# Patient Record
Sex: FEMALE | Race: WHITE | NOT HISPANIC OR LATINO | Employment: OTHER | URBAN - METROPOLITAN AREA
[De-identification: names, ages, dates, MRNs, and addresses within clinical notes are randomized per-mention and may not be internally consistent; named-entity substitution may affect disease eponyms.]

---

## 2017-03-30 ENCOUNTER — ALLSCRIPTS OFFICE VISIT (OUTPATIENT)
Dept: OTHER | Facility: OTHER | Age: 77
End: 2017-03-30

## 2017-03-30 DIAGNOSIS — G60.9 HEREDITARY AND IDIOPATHIC NEUROPATHY: ICD-10-CM

## 2017-04-27 ENCOUNTER — ALLSCRIPTS OFFICE VISIT (OUTPATIENT)
Dept: OTHER | Facility: OTHER | Age: 77
End: 2017-04-27

## 2017-06-03 ENCOUNTER — LAB CONVERSION - ENCOUNTER (OUTPATIENT)
Dept: OTHER | Facility: OTHER | Age: 77
End: 2017-06-03

## 2017-06-03 LAB
A/G RATIO (HISTORICAL): 2.2 (CALC) (ref 1–2.5)
ALBUMIN SERPL BCP-MCNC: 4.1 G/DL (ref 3.6–5.1)
ALP SERPL-CCNC: 58 U/L (ref 33–130)
ALT SERPL W P-5'-P-CCNC: 9 U/L (ref 6–29)
AST SERPL W P-5'-P-CCNC: 19 U/L (ref 10–35)
BASOPHILS # BLD AUTO: 0.5 %
BASOPHILS # BLD AUTO: 22 CELLS/UL (ref 0–200)
BILIRUB SERPL-MCNC: 0.5 MG/DL (ref 0.2–1.2)
BUN SERPL-MCNC: 20 MG/DL (ref 7–25)
BUN/CREA RATIO (HISTORICAL): ABNORMAL (CALC) (ref 6–22)
CALCIUM (ADJUSTED FOR ALBUMIN) (HISTORICAL): 9.8 MG/DL (CALC) (ref 8.6–10.2)
CALCIUM SERPL-MCNC: 9.6 MG/DL (ref 8.6–10.4)
CHLORIDE SERPL-SCNC: 107 MMOL/L (ref 98–110)
CHOLEST SERPL-MCNC: 173 MG/DL (ref 125–200)
CHOLEST/HDLC SERPL: 2.1 (CALC)
CO2 SERPL-SCNC: 29 MMOL/L (ref 20–31)
CREAT SERPL-MCNC: 0.67 MG/DL (ref 0.6–0.93)
DEPRECATED RDW RBC AUTO: 14.7 % (ref 11–15)
EGFR AFRICAN AMERICAN (HISTORICAL): 97 ML/MIN/1.73M2
EGFR-AMERICAN CALC (HISTORICAL): 84 ML/MIN/1.73M2
EOSINOPHIL # BLD AUTO: 251 CELLS/UL (ref 15–500)
EOSINOPHIL # BLD AUTO: 5.7 %
EST. AVERAGE GLUCOSE BLD GHB EST-MCNC: 103 (CALC)
EST. AVERAGE GLUCOSE BLD GHB EST-MCNC: 5.7 (CALC)
GAMMA GLOBULIN (HISTORICAL): 1.9 G/DL (CALC) (ref 1.9–3.7)
GLUCOSE (HISTORICAL): 84 MG/DL (ref 65–99)
HBA1C MFR BLD HPLC: 5.2 % OF TOTAL HGB
HCT VFR BLD AUTO: 38.9 % (ref 35–45)
HDLC SERPL-MCNC: 81 MG/DL
HGB BLD-MCNC: 12.9 G/DL (ref 11.7–15.5)
LDL CHOLESTEROL (HISTORICAL): 82 MG/DL (CALC)
LYMPHOCYTES # BLD AUTO: 21.7 %
LYMPHOCYTES # BLD AUTO: 955 CELLS/UL (ref 850–3900)
MCH RBC QN AUTO: 30.6 PG (ref 27–33)
MCHC RBC AUTO-ENTMCNC: 33.1 G/DL (ref 32–36)
MCV RBC AUTO: 92.6 FL (ref 80–100)
MONOCYTES # BLD AUTO: 312 CELLS/UL (ref 200–950)
MONOCYTES (HISTORICAL): 7.1 %
NEUTROPHILS # BLD AUTO: 2860 CELLS/UL (ref 1500–7800)
NEUTROPHILS # BLD AUTO: 65 %
NON-HDL-CHOL (CHOL-HDL) (HISTORICAL): 92 MG/DL (CALC)
PLATELET # BLD AUTO: 276 THOUSAND/UL (ref 140–400)
PMV BLD AUTO: 8.7 FL (ref 7.5–12.5)
POTASSIUM SERPL-SCNC: 4.2 MMOL/L (ref 3.5–5.3)
RBC # BLD AUTO: 4.2 MILLION/UL (ref 3.8–5.1)
SODIUM SERPL-SCNC: 141 MMOL/L (ref 135–146)
TOTAL PROTEIN (HISTORICAL): 6 G/DL (ref 6.1–8.1)
TOTAL PROTEIN (HISTORICAL): 6.2 G/DL (ref 6.1–8.1)
TRIGL SERPL-MCNC: 49 MG/DL
TSH SERPL DL<=0.05 MIU/L-ACNC: 3.97 MIU/L (ref 0.4–4.5)
WBC # BLD AUTO: 4.4 THOUSAND/UL (ref 3.8–10.8)

## 2017-06-05 ENCOUNTER — LAB CONVERSION - ENCOUNTER (OUTPATIENT)
Dept: OTHER | Facility: OTHER | Age: 77
End: 2017-06-05

## 2017-06-05 ENCOUNTER — GENERIC CONVERSION - ENCOUNTER (OUTPATIENT)
Dept: OTHER | Facility: OTHER | Age: 77
End: 2017-06-05

## 2017-06-05 LAB
ALBUMIN SERPL BCP-MCNC: 4 G/DL (ref 3.8–4.8)
ALPHA 1 (HISTORICAL): 0.2 G/DL (ref 0.2–0.3)
ALPHA 2 (HISTORICAL): 0.6 G/DL (ref 0.5–0.9)
BETA-1 (HISTORICAL): 0.4 G/DL (ref 0.4–0.6)
BETA-2 (HISTORICAL): 0.3 G/DL (ref 0.2–0.5)
FOLATE SERPL-MCNC: >24 NG/ML
GAMMA GLOBULIN (HISTORICAL): 0.8 G/DL (ref 0.8–1.7)
INTERPRETATION (HISTORICAL): NORMAL
TOTAL PROTEIN (HISTORICAL): 6.2 G/DL (ref 6.1–8.1)
VIT B12 SERPL-MCNC: 1054 PG/ML (ref 200–1100)

## 2017-06-06 ENCOUNTER — LAB CONVERSION - ENCOUNTER (OUTPATIENT)
Dept: OTHER | Facility: OTHER | Age: 77
End: 2017-06-06

## 2017-06-06 LAB
ALBUMIN SERPL BCP-MCNC: 4 G/DL (ref 3.8–4.8)
ALPHA 1 (HISTORICAL): 0.2 G/DL (ref 0.2–0.3)
ALPHA 2 (HISTORICAL): 0.6 G/DL (ref 0.5–0.9)
ANTI-NUCLEAR ANTIBODY (ANA) (HISTORICAL): NEGATIVE
BETA-1 (HISTORICAL): 0.4 G/DL (ref 0.4–0.6)
BETA-2 (HISTORICAL): 0.3 G/DL (ref 0.2–0.5)
FOLATE SERPL-MCNC: >24 NG/ML
GAMMA GLOBULIN (HISTORICAL): 0.8 G/DL (ref 0.8–1.7)
INTERPRETATION (HISTORICAL): NORMAL
TOTAL PROTEIN (HISTORICAL): 6.2 G/DL (ref 6.1–8.1)
VIT B12 SERPL-MCNC: 1054 PG/ML (ref 200–1100)

## 2017-09-21 ENCOUNTER — GENERIC CONVERSION - ENCOUNTER (OUTPATIENT)
Dept: OTHER | Facility: OTHER | Age: 77
End: 2017-09-21

## 2017-09-27 ENCOUNTER — GENERIC CONVERSION - ENCOUNTER (OUTPATIENT)
Dept: OTHER | Facility: OTHER | Age: 77
End: 2017-09-27

## 2017-10-09 ENCOUNTER — ALLSCRIPTS OFFICE VISIT (OUTPATIENT)
Dept: OTHER | Facility: OTHER | Age: 77
End: 2017-10-09

## 2017-10-10 NOTE — PROGRESS NOTES
Assessment  1  Lumbar radiculopathy (724 4) (M54 16)   2  Leg weakness (729 89) (R29 898)   3  Peripheral neuropathy, hereditary/idiopathic (356 9) (G60 9)    Plan  Lumbar radiculopathy, Peripheral neuropathy, hereditary/idiopathic    · Follow-up visit in 6 months Evaluation and Treatment  Follow-up  Status: Complete   Done: 61OHG8952   Ordered; For: Lumbar radiculopathy, Peripheral neuropathy, hereditary/idiopathic; Ordered By: Charlie Liang Performed:  Due: 36CRU9734; Last Updated By: Chao Shelby; 10/9/2017 2:40:41 PM    Discussion/Summary  Discussion Summary:   1 neurontin 300mg 3 at bedtime , will continue   it is lasting longer suggestive of some improvement and taking neuroquell in afternoon labs done f/u in 6 mths radicular symptoms and weakness ,   she is using a AFO brace   left foot drop persists although strength is slightly better  on Topomax for migraineswe discussed therapy and she refuses at this time , she is concerned about left hip , she may reconsider therapy in the future        Chief Complaint  Chief Complaint Free Text Note Form: Patient present for follow up regarding leg weakness, lumbar radiculopathy and peripheral neuropathy  History of Present Illness  HPI: Uriel Sanchez is a 75 yo woman with a history of multiple medical problems including fibromyalgia, HTN, hyperlipidemia, and lumbar spinal degenerative disease and mild peripheral neuropathy who is returning to Neurology clinic for follow up  Neurologic examination did reveal worsened left leg weakness compared to prior examination, with decreased reflexes in the left leg compared to the right and mild decreased vibratory sense  She did undergo EMG of the lower extremities which showed a polyneuropathy and a left l4/l5 radiculopathy in June of 2015  She was originally evaluated by dr Goldstein and neurontin was added   On gabapentin 600mg tid with benefit   she did undergo a l2/S1 laminectomy and decompression in march 2016 and has noted improvement in the left leg weakness   She is now able to lift up her left leg   The left leg symptoms are better after the surgery but still have not returned to normal  she is now following with a chiropractor and being treated with electrical stimulation   she can uses canes while home but uses a walker when outside  She attributes radiculopathy due to lyme diease     the last visit , she started on a protocol of salt and C Plus for Lyme disease, on neuroquell 2 tablets in afternoon and 3 300mg at night of gabapentn   facial droop, fever or cranial nerve issues     No further migraines on Topomax   in june included cmp , b12, folate , ipep and spep which were normal   lost a great deal of weight , she does reports problems with stairs  She is going to expert lyme Disease specialist          Active Problems  1  Abnormal chest x-ray (793 2) (R93 8)   2  Anemia (285 9) (D64 9)   3  Anxiety (300 00) (F41 9)   4  Back pain (724 5) (M54 9)   5  Chest pain (786 50) (R07 9)   6  Chronic osteomyelitis of left foot (730 17) (M86 672)   7  Deep vein thrombosis of left lower extremity (453 40) (I82 402)   8  Depression with anxiety (300 4) (F41 8)   9  Dermatitis (692 9) (L30 9)   10  Encounter for screening mammogram for malignant neoplasm of breast (V76 12)    (Z12 31)   11  Esophageal reflux (530 81) (K21 9)   12  Fibromyalgia (729 1) (M79 7)   13  Generalized pain (780 96) (R52)   14  Headache (784 0) (R51)   15  History of falling (V15 88) (Z91 81)   16  Hyperlipidemia (272 4) (E78 5)   17  Hypertension (401 9) (I10)   18  Hypothyroidism (244 9) (E03 9)   19  Impaired fasting glucose (790 21) (R73 01)   20  Ingrowing nail (703 0) (L60 0)   21  Insomnia (780 52) (G47 00)   22  Knee osteoarthritis (715 36) (M17 10)   23  Knee pain, left (719 46) (M25 562)   24  Left foot pain (729 5) (M79 672)   25  Leg pain, left (729 5) (M79 605)   26  Leg weakness (729 89) (R24 063)   27   Lumbar radiculopathy (024 4) (M54 16)   28  Lung mass (786 6) (R91 8)   29  Lyme disease (088 81) (A69 20)   30  Medicare annual wellness visit, initial (V70 0) (Z00 00)   31  Memory Lapses Or Loss (780 93)   32  Neck pain (723 1) (M54 2)   33  Need for pneumococcal vaccination (V03 82) (Z23)   34  Need for prophylactic vaccination and inoculation against influenza (V04 81) (Z23)   35  Need for vaccination with 13-polyvalent pneumococcal conjugate vaccine (V03 82) (Z23)   36  Onychomycosis (110 1) (B35 1)   37  Pain in both lower extremities (729 5) (M79 604,M79 605)   38  Peripheral neuropathy, hereditary/idiopathic (356 9) (G60 9)   39  Pleural nodules (786 6) (R22 2)   40  Preop examination (V72 84) (Z01 818)   41  Screening for osteoporosis (V82 81) (Z13 820)   42  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)   43  Spondylosis of lumbar region without myelopathy or radiculopathy (721 3) (M47 816)   44  Tinnitus (388 30) (H93 19)   45  Varicose veins (454 9) (I83 90)    Past Medical History  1  History of Abdominal adhesions (568 0) (K66 0)   2  History of Carpal tunnel syndrome, unspecified laterality (354 0) (G56 00)   3  History of CT Lung Pulmonary Nodule Solitary   4  History of Endometriosis (617 9) (N80 9)   5  History of Foot Pain (Soft Tissue) (729 5)   6  History of Ganglion (727 43) (M67 40)   7  History of fatigue (V13 89) (Z87 898)   8  History of migraine (V12 49) (Z86 69)   9  History of peripheral neuropathy (V12 49) (Z86 69)   10  History of sciatica (V12 49) (Z86 69)   11  History of Joint Pain In The Right Knee   12  History of Loss Of Hair From Head   13  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)   14  History of Phoenix's Neuroma Of The Right Foot (355 6)   15  History of Paroxysmal atrial fibrillation (427 31) (I48 0)   16  History of Periorbital Eye Pain Left   17  History of Thrombophlebitis (V12 52)    Surgical History  1  History of Appendectomy   2  History of Biopsy Lymph Node   3   History of Breast Surgery Puncture Aspiration Of Cyst Left Breast   4  History of Catheter Ablation Atrial Fibrillation   5  History of Cholecystectomy   6  History of Colonoscopy (Fiberoptic)   7  History of Foot Surgery   8  History of Hand Surgery   9  History of Hand Surgery   10  History of Knee Arthroscopy (Therapeutic)   11  History of Total Abdominal Hysterectomy    Family History  Father    1  Family history of Attention-deficit Hyperactivity Disorder   2  Family history of Stroke Syndrome (V17 1)  Brother    3  Family history of Prostate Cancer (V16 42)  Maternal Grandfather    4  Family history of Acute Myocardial Infarction (V17 3)  Family History    5  Family history of Cancer    Social History   · Being A Social Drinker   · Denied: Drug use (305 90) (F19 90)   · Never A Smoker    Current Meds   1  Aspirin 81 MG TABS; TAKE 1 TABLET DAILY; Therapy: (Recorded:21Oct2016) to Recorded   2  Bactroban 2 % OINT; APPLY A SMALL AMOUNT 3 TIMES DAILY AS DIRECTED; Therapy: 26QBU0713 to (Evaluate:21Aug2016)  Requested for: 91Bul0190; Last   Rx:88Set2177 Ordered   3  Biotin 5000 MCG Oral Tablet; TAKE AS DIRECTED; Therapy: (25-62-29-72) to Recorded   4  Centrum Silver Oral Tablet; TAKE 1 TABLET DAILY; Therapy: (25-62-29-72) to Recorded   5  Chlorella 500 MG Oral Capsule; Therapy: (Recorded:06Mvc0460) to Recorded   6  Cymbalta 30 MG Oral Capsule Delayed Release Particles; TAKE 3 CAPSULES DAILY; Therapy: 84UVD6243 to (Evaluate:11Oct2014)  Requested for: 84Shp7806; Last   Rx:15Apr2014 Ordered   7  Gabapentin 300 MG Oral Capsule; take 2 capsule 3 times daily; Therapy: 84Dzg7925 to (Evaluate:25Mar2018)  Requested for: 43Kyl1953; Last   Rx:30Mar2017 Ordered   8  Levothyroxine Sodium 88 MCG Oral Tablet; Take 1 tablet by mouth  every day; Therapy: 02SCG0559 to (Evaluate:15Oct2017)  Requested for: 74JAJ9402; Last   Rx:34Llk3701 Ordered   9  Lisinopril 10 MG Oral Tablet; TAKE 1 TABLET DAILY; Last Rx:18Ksp2732 Ordered   10  Ocuvite TABS; Therapy: ([facility]) to Recorded   11  Topiramate 50 MG Oral Tablet; Take 1 tablet by mouth  daily; Therapy: 86QSF4189 to (Evaluate:25Mar2018)  Requested for: 27Apr2017; Last    Rx:30Mar2017 Ordered   12  Triamcinolone Acetonide 0 5 % External Cream; Apply sparingly to affected area twice    daily as needed; Therapy: 43HVS1203 to (Evaluate:11Apr2017)  Requested for: 36DPA0937; Last    Rx:10Feb2017 Ordered   13  Vitamin D3 2000 UNIT Oral Tablet; Take 1 tablet daily; Therapy: ([facility]) to Recorded   14  Zetia 10 MG Oral Tablet; Take 1 tablet by mouth  daily; Therapy: 99HQB1647 to (Evaluate:08Aug2017)  Requested for: 27Apr2017; Last    Rx:11Nov2016; Status: ACTIVE - Renewal Voided Ordered    Allergies  1  Cranberry POWD   2  Voltaren  3  Latex   4  Soy  Denied    5  Coumadin TABS   6  Hyzaar TABS   7  Niacin TBCR   8  Norvasc TABS    Vitals  Signs   Recorded: 54ASF1080 02:12PM   Heart Rate: 60  Respiration: 16  Systolic: 614, LUE, Sitting  Diastolic: 60, LUE, Sitting  Weight: 136 lb 7 oz  BMI Calculated: 22 36  BSA Calculated: 1 69    Physical Exam    Constitutional   General appearance: No acute distress, well appearing and well nourished  Eyes   Ophthalmoscopic examination: Vision is grossly normal  Gross visual field testing by confrontation shows no abnormalities  EOMI in both eyes  Conjunctivae clear  Eyelids normal palpebral fissures equal  Orbits exhibit normal position  No discharge from the eyes  PERRL  Musculoskeletal   Gait and station: Abnormal   Gait evaluation demonstrated limping on the left-and-left afo  Muscle strength: Abnormal     Strength examination: wrist strength was normal on the left side  shoulder strength was normal on the left side  Foot Plantar Flexion: 2/5/5 on the left side  Foot Dorsiflexion: 1/5/5 on the left side  Ankle Inversion: 1/5/5 on the left side  Ankle Eversion: 1/5/5 on the left side     Knee Flexion: 4/5/5 on the left side  Knee Extension: 4/5/5 on the left side  Hip Flexion: 5-/5/5 on the right side and 4/5/5 on the left side  Muscle tone: Abnormal     Motor tone:  the muscle tone was normal    Involuntary movements: None observed  Neurologic   Orientation to person, place, and time: Normal     Attention span and concentration: Abnormal   tangential   Language: Names objects, able to repeat phrases and speaks spontaneously  Fund of knowledge: Normal vocabulary with appropriate knowledge of current events and past history  1st cranial nerve: Normal     2nd cranial nerve: Normal     3rd, 4th, and 6th cranial nerves: Normal     5th cranial nerve: Normal     7th cranial nerve: Normal     8th cranial nerve: Normal     10th cranial nerve: Normal     11th cranial nerve: Normal     12th cranial nerve: Normal  -decrease in light touch , vibriation in le  Reflexes: Abnormal   Deep tendon reflexes: 1+ right patella,-1+ left patella,-1+ right ankle jerk,-1+ left ankle jerk-and-toes downgoing  Coordination: Abnormal   Coordination: impaired balance  Cortical function: Normal     Mood and affect: Abnormal   Mood and Affect: anxious-and-blunted  Results/Data  (Q) VITAMIN B12/FOLATE, SERUM PANEL 02QOK2590 09:54AM Alis Cobb   REPORT COMMENT:  FASTING:YES     Test Name Result Flag Reference   VITAMIN B12 1054 pg/mL  200-1100   FOLATE, SERUM >24 0 ng/mL     Reference Range                             Low:           <3 4                             Borderline:    3 4-5 4                             Normal:        >5 4     (Q) SAMMIE SCREEN, IFA, WITH REFLEX TO TITER AND PATTERN 02Jun2017 09:54AM Rebecca Gold     Test Name Result Flag Reference   SAMMIE SCREEN, IFA NEGATIVE  NEGATIVE   SAMMIE IFA is a first line screen for detecting the  presence of up to approximately 150 autoantibodies in  various autoimmune diseases   A negative SAMMIE IFA result  suggests SAMMIE-associated autoimmune diseases are not  present at this time      Visit Physician FAQs for interpretation of all  antibodies in the [place], prevalence, and association  with diseases at [email or web address]     Future Appointments    Date/Time Provider Specialty Site   10/30/2017 04:40 PM Krystal Marcelino MD Family Medicine [address] [place] [address]     Signatures   Electronically signed by :  Eliecer Haque DO; Oct  9 2017  4:04PM EST                       (Author)

## 2017-10-30 ENCOUNTER — ALLSCRIPTS OFFICE VISIT (OUTPATIENT)
Dept: OTHER | Facility: OTHER | Age: 77
End: 2017-10-30

## 2017-10-31 NOTE — PROGRESS NOTES
Assessment  1  Hypertension (401 9) (I10)   2  Hyperlipidemia (272 4) (E78 5)   3  Hypothyroidism (244 9) (E03 9)   4  Fibromyalgia (729 1) (M79 7)   5  Depression with anxiety (300 4) (F41 8)   6  Anemia (285 9) (D64 9)   7  Impaired fasting glucose (790 21) (R73 01)   8  Peripheral neuropathy, hereditary/idiopathic (356 9) (G60 9)   9  Need for prophylactic vaccination and inoculation against influenza (V04 81) (Z23)    Plan  Anemia, Depression with anxiety, Fibromyalgia, Hyperlipidemia, Hypertension,  Hypothyroidism, Impaired fasting glucose, Peripheral neuropathy, hereditary/idiopathic    · (1) CBC/PLT/DIFF; Status:Active; Requested for:20Apr2018;    · (1) COMPREHENSIVE METABOLIC PANEL; Status:Active; Requested for:20Apr2018;    · (1) HEMOGLOBIN A1C; Status:Active; Requested for:20Apr2018;    · (1) LIPID PANEL FASTING W DIRECT LDL REFLEX; Status:Active; Requested  for:20Apr2018;    · (1) TSH WITH FT4 REFLEX; Status:Active; Requested for:20Apr2018;   Depression with anxiety, Fibromyalgia    · Cymbalta 30 MG Oral Capsule Delayed Release Particles (DULoxetine HCl);  TAKE 3 CAPSULES DAILY  Fibromyalgia, Generalized pain, Leg weakness    · Gabapentin 300 MG Oral Capsule; take 2 capsule 3 times daily  Headache    · Topiramate 50 MG Oral Tablet; Take 1 tablet by mouth  daily  Health Maintenance    · *VB - Fall Risk Assessment  (Dx Z13 89 Screen for Neurologic Disorder);  Status:Complete;   Done: [id number] 05:21PM  Hyperlipidemia    · Zetia 10 MG Oral Tablet (Ezetimibe); Take 1 tablet by mouth  daily  Hypertension    · Lisinopril 10 MG Oral Tablet; TAKE 1 TABLET DAILY  Hypothyroidism    · Levothyroxine Sodium 88 MCG Oral Tablet; Take 1 tablet by mouth  every day  Need for prophylactic vaccination and inoculation against influenza    · Fluzone High-Dose 0 5 ML Intramuscular Suspension Prefilled Syringe;  INJECT 0 5  ML Intramuscular; To Be Done: 30YMZ2287    Discussion/Summary    Cont current medications   specialists as scheduled  flu shot today  Prevnar 10/2015  Got Pneumovax 2017  Dexa normal  Got script already  precautions  Pt refused PT in 6 months with labs  Possible side effects of new medications were reviewed with the patient/guardian today  The treatment plan was reviewed with the patient/guardian  The patient/guardian understands and agrees with the treatment plan      Chief Complaint  Patient presents for a 6 month follow up      History of Present Illness  Pt is here by herself  states she follows chiopractor/functional medicine Dr Shanika Doty for her neuropathy Q week  Pt states she is following a salt and vitC protocol for lyme disease for 1 month  She is taking salt 9g per day with vitC and other supplements  Sometimes had stomach cramps but tolerable  cardiology Dr Marina Robles for Afib s/p ablation 2006 yearly  Denies chest pain, palpitation etc  is on lisinopril 5mg bid now  is on zetia daily  levothyroxine 88 mcg daily  Feels OK  orthopedics for lumbar radiculopathy and lumbar spinal stenosis s/p L2-S2 laminectomy  neurology for headache/neuropathy  On gabapentin 600mg tid which helped little  She is on topamax daily which helped  rheumatology Dr Paulino Lopez for fibromyalgia/lyme disease  On cymbalta daily  opthalmology yearly  with , 5 dog and 8 cats  Does all ADL's  No drive since 9/5510  several months ago  Refused to see PT  Use walker always  depression  The patient is being seen for follow-up of hypothyroidism of undetermined etiology  The patient reports doing well  Associated symptoms: no depression  Medications:  the patient is adherent to her medication regimen, but-- she denies medication side effects  The patient states her hyperlipidemia has been under good control since the last visit  Comorbid Illnesses: hypertension  Symptoms: The patient is currently asymptomatic  Medications: the patient is adherent with her medication regimen  -- She denies medication side effects     The patient presents for follow-up of essential hypertension  The patient states she has been doing well with her blood pressure control since the last visit  She has no comorbid illnesses  Symptoms: The patient is currently asymptomatic  Home monitoring: The patient is not checking blood pressure at home  Medications: the patient is adherent with her medication regimen  -- She denies medication side effects  Review of Systems    Constitutional: No fever, no chills, feels well, no tiredness, no recent weight gain or weight loss  ENT: no complaints of earache, no loss of hearing, no nose bleeds, no nasal discharge, no sore throat, no hoarseness  Cardiovascular: No complaints of slow heart rate, no fast heart rate, no chest pain, no palpitations, no leg claudication, no lower extremity edema  Respiratory: No complaints of shortness of breath, no wheezing, no cough, no SOB on exertion, no orthopnea, no PND  Gastrointestinal: No complaints of abdominal pain, no constipation, no nausea or vomiting, no diarrhea, no bloody stools  Musculoskeletal: No complaints of arthralgias, no myalgias, no joint swelling or stiffness, no limb pain or swelling  Preventive Quality 65 and Older: The patient is currently asymptomatic Symptoms Include: no recent fall       Active Problems  1  Abnormal chest x-ray (793 2) (R93 8)   2  Anemia (285 9) (D64 9)   3  Anxiety (300 00) (F41 9)   4  Back pain (724 5) (M54 9)   5  Chronic osteomyelitis of left foot (730 17) (M86 672)   6  Deep vein thrombosis of left lower extremity (453 40) (I82 402)   7  Depression with anxiety (300 4) (F41 8)   8  Dermatitis (692 9) (L30 9)   9  Encounter for screening mammogram for malignant neoplasm of breast (V76 12)   (Z12 31)   10  Esophageal reflux (530 81) (K21 9)   11  Fibromyalgia (729 1) (M79 7)   12  Generalized pain (780 96) (R52)   13  Headache (784 0) (R51)   14  History of falling (V15 88) (Z91 81)   15   Hyperlipidemia (272 4) (E78 5) 16  Hypertension (401 9) (I10)   17  Hypothyroidism (244 9) (E03 9)   18  Impaired fasting glucose (790 21) (R73 01)   19  Ingrowing nail (703 0) (L60 0)   20  Insomnia (780 52) (G47 00)   21  Knee osteoarthritis (715 36) (M17 10)   22  Knee pain, left (719 46) (M25 562)   23  Left foot pain (729 5) (M79 672)   24  Leg pain, left (729 5) (M79 605)   25  Leg weakness (729 89) (R29 898)   26  Lumbar radiculopathy (724 4) (M54 16)   27  Lung mass (786 6) (R91 8)   28  Lyme disease (088 81) (A69 20)   29  Medicare annual wellness visit, initial (V70 0) (Z00 00)   30  Memory Lapses Or Loss (780 93)   31  Neck pain (723 1) (M54 2)   32  Need for pneumococcal vaccination (V03 82) (Z23)   33  Need for prophylactic vaccination and inoculation against influenza (V04 81) (Z23)   34  Need for vaccination with 13-polyvalent pneumococcal conjugate vaccine (V03 82) (Z23)   35  Onychomycosis (110 1) (B35 1)   36  Pain in both lower extremities (729 5) (M79 604,M79 605)   37  Peripheral neuropathy, hereditary/idiopathic (356 9) (G60 9)   38  Pleural nodules (786 6) (R22 2)   39  Preop examination (V72 84) (Z01 818)   40  Screening for osteoporosis (V82 81) (Z13 820)   41  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)   42  Spondylosis of lumbar region without myelopathy or radiculopathy (721 3) (M47 816)   43  Tinnitus (388 30) (H93 19)   44  Varicose veins (454 9) (I83 90)    Past Medical History  1  History of Abdominal adhesions (568 0) (K66 0)   2  History of Carpal tunnel syndrome, unspecified laterality (354 0) (G56 00)   3  History of CT Lung Pulmonary Nodule Solitary   4  History of Endometriosis (617 9) (N80 9)   5  History of Foot Pain (Soft Tissue) (729 5)   6  History of Ganglion (727 43) (M67 40)   7  History of fatigue (V13 89) (Z87 898)   8  History of migraine (V12 49) (Z86 69)   9  History of peripheral neuropathy (V12 49) (Z86 69)   10  History of sciatica (V12 49) (Z86 69)   11   History of Joint Pain In The Right Knee 12  History of Loss Of Hair From Head   13  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)   14  History of Phoenix's Neuroma Of The Right Foot (355 6)   15  History of Paroxysmal atrial fibrillation (427 31) (I48 0)   16  History of Periorbital Eye Pain Left   17  History of Thrombophlebitis (V12 52)    Surgical History  1  History of Appendectomy   2  History of Biopsy Lymph Node   3  History of Breast Surgery Puncture Aspiration Of Cyst Left Breast   4  History of Catheter Ablation Atrial Fibrillation   5  History of Cholecystectomy   6  History of Colonoscopy (Fiberoptic)   7  History of Foot Surgery   8  History of Hand Surgery   9  History of Hand Surgery   10  History of Knee Arthroscopy (Therapeutic)   11  History of Total Abdominal Hysterectomy    Family History  Father    1  Family history of Attention-deficit Hyperactivity Disorder   2  Family history of Stroke Syndrome (V17 1)  Brother    3  Family history of Prostate Cancer (V16 42)  Maternal Grandfather    4  Family history of Acute Myocardial Infarction (V17 3)  Family History    5  Family history of Cancer    Social History   · Being A Social Drinker   · Denied: Drug use (305 90) (F19 90)   · Never A Smoker    Current Meds   1  Bactroban 2 % OINT; APPLY A SMALL AMOUNT 3 TIMES DAILY AS DIRECTED; Therapy: 20SMP6574 to (Evaluate:17Iti6007)  Requested for: 80Mez4911; Last   Rx:67Bcj3367 Ordered   2  Biotin 5000 MCG Oral Tablet; TAKE AS DIRECTED; Therapy: (Bartholome March) to Recorded   3  Centrum Silver Oral Tablet; TAKE 1 TABLET DAILY; Therapy: (Bartholome March) to Recorded   4  Chlorella 500 MG Oral Capsule; Therapy: (Recorded:55Wos3098) to Recorded   5  Cymbalta 30 MG Oral Capsule Delayed Release Particles; TAKE 3 CAPSULES DAILY; Therapy: 64ROM0362 to (Evaluate:11Oct2014)  Requested for: 27Apr2017; Last   Rx:89Kru9743 Ordered   6  Ezetimibe 10 MG Oral Tablet; Take 1 tablet by mouth  daily;    Therapy: 08MAI7922 to (Evaluate:19Oct2018)  Requested for: 24Oct2017; Last   WJ:[id number] Ordered   7  Gabapentin 300 MG Oral Capsule; take 2 capsule 3 times daily; Therapy: 69Gzf4884 to (Evaluate:25Mar2018)  Requested for: 27Apr2017; Last   Rx:30Mar2017 Ordered   8  Levothyroxine Sodium 88 MCG Oral Tablet; Take 1 tablet by mouth  every day; Therapy: 94HLC7627 to (Evaluate:15Oct2017)  Requested for: 82VAF9889; Last   Rx:13Jrm8364 Ordered   9  Lisinopril 10 MG Oral Tablet; TAKE 1 TABLET DAILY; Last Rx:81Ylt3299 Ordered   10  Topiramate 50 MG Oral Tablet; Take 1 tablet by mouth  daily; Therapy: 05XTX5127 to (Evaluate:25Mar2018)  Requested for: 27Apr2017; Last    Rx:30Mar2017 Ordered   11  Triamcinolone Acetonide 0 5 % External Cream; Apply sparingly to affected area twice    daily as needed; Therapy: 61XWW4864 to (Evaluate:11Apr2017)  Requested for: 31WFW8210; Last    Rx:15Llp9004 Ordered   12  Zetia 10 MG Oral Tablet; Take 1 tablet by mouth  daily; Therapy: 85MIZ0037 to (Evaluate:70Ppt8667)  Requested for: 27Apr2017; Last    Rx:11Nov2016; Status: ACTIVE - Renewal Voided Ordered    Allergies  1  Cranberry POWD   2  Voltaren  3  Latex   4  Soy  Denied    5  Coumadin TABS   6  Hyzaar TABS   7  Niacin TBCR   8  Norvasc TABS    Vitals  Vital Signs    Recorded: 75QZQ6811 04:45PM   Temperature 98 1 F, Tympanic   Heart Rate 60, R Radial   Pulse Quality Normal, R Radial   Respiration Quality Normal   Respiration 16   Systolic 992, LUE, Sitting   Diastolic 72, LUE, Sitting   Height 5 ft 5 5 in   Weight 137 lb    BMI Calculated 22 45   BSA Calculated 1 69   Pain Scale 0     Physical Exam    Constitutional   General appearance: No acute distress, well appearing and well nourished  Pulmonary   Respiratory effort: No increased work of breathing or signs of respiratory distress  Auscultation of lungs: Clear to auscultation  Cardiovascular   Auscultation of heart: Normal rate and rhythm, normal S1 and S2, without murmurs  Examination of extremities for edema and/or varicosities: Normal     Carotid pulses: Normal     Abdomen   Abdomen: Non-tender, no masses  Liver and spleen: No hepatomegaly or splenomegaly  Lymphatic   Palpation of lymph nodes in neck: No lymphadenopathy  Musculoskeletal   Gait and station: Normal          Signatures   Electronically signed by :  Sunitha Scott MD; Oct 30 2017  5:22PM EST                       (Author)

## 2018-01-11 NOTE — RESULT NOTES
Message   DW pt on 3/21/2016 OV  Verified Results  (1) CBC/PLT/DIFF [id number] 01:37PM Bernadine Carbajal     Test Name Result Flag Reference   WBC COUNT 8 40 Thousand/uL  4 31-10 16   RBC COUNT 3 88 Million/uL  3 81-5 12   HEMOGLOBIN 11 9 g/dL  11 5-15 4   HEMATOCRIT 36 0 %  34 8-46  1   MCV 93 fL  82-98   MCH 30 7 pg  26 8-34 3   MCHC 33 1 g/dL  31 4-37 4   RDW 13 7 %  11 6-15 1   MPV 9 9 fL  8 9-12 7   PLATELET COUNT 363 Thousands/uL  149-390   nRBC AUTOMATED 0 /100 WBCs     NEUTROPHILS RELATIVE PERCENT 77 % H 43-75   LYMPHOCYTES RELATIVE PERCENT 13 % L 14-44   MONOCYTES RELATIVE PERCENT 7 %  4-12   EOSINOPHILS RELATIVE PERCENT 3 %  0-6   BASOPHILS RELATIVE PERCENT 0 %  0-1   NEUTROPHILS ABSOLUTE COUNT 6 46 Thousands/µL  1 85-7 62   LYMPHOCYTES ABSOLUTE COUNT 1 08 Thousands/µL  0 60-4 47   MONOCYTES ABSOLUTE COUNT 0 56 Thousand/µL  0 17-1 22   EOSINOPHILS ABSOLUTE COUNT 0 27 Thousand/µL  0 00-0 61   BASOPHILS ABSOLUTE COUNT 0 02 Thousands/µL  0 00-0 10     (1) COMPREHENSIVE METABOLIC PANEL 01QKV0647 64:46WP Bernadine Carbajal   National Kidney Disease Education Program recommendations are as follows:  GFR calculation is accurate only with a steady state creatinine  Chronic Kidney disease less than 60 ml/min/1 73 sq  meters  Kidney failure less than 15 ml/min/1 73 sq  meters  Test Name Result Flag Reference   GLUCOSE,RANDM 90 mg/dL     If the patient is fasting, the ADA then defines impaired fasting glucose as > 100 mg/dL and diabetes as > or equal to 123 mg/dL     SODIUM 138 mmol/L  136-145   POTASSIUM 4 0 mmol/L  3 5-5 3   CHLORIDE 104 mmol/L  100-108   CARBON DIOXIDE 28 mmol/L  21-32   ANION GAP (CALC) 6 mmol/L  4-13   BLOOD UREA NITROGEN 18 mg/dL  5-25   CREATININE 0 76 mg/dL  0 60-1 30   Standardized to IDMS reference method   CALCIUM 8 8 mg/dL  8 3-10 1   BILI, TOTAL 0 52 mg/dL  0 20-1 00   ALK PHOSPHATAS 74 U/L     ALT (SGPT) 10 U/L L 12-78   AST(SGOT) 16 U/L  5-45   ALBUMIN 3 4 g/dL L 3 5-5 0   TOTAL PROTEIN 6 0 g/dL L 6 4-8 2   eGFR Non-African American      >60 0 ml/min/1 73sq m     (1) LIPID PANEL FASTING W DIRECT LDL REFLEX [id number] 01:37PM Krystal Marcelino   Triglyceride:         Normal              <150 mg/dl       Borderline High    150-199 mg/dl       High               200-499 mg/dl       Very High          >499 mg/dl  Cholesterol:         Desirable        <200 mg/dl      Borderline High  200-239 mg/dl      High             >239 mg/dl  HDL Cholesterol:        High    >59 mg/dL      Low     <41 mg/dL  LDL Cholesterol:        Optimal          <100 mg/dl         Near Optimal     100-129 mg/dl        Above Optimal          Borderline High   130-159 mg/dl          High              160-189 mg/dl          Very High        >189 mg/dl  LDL CALCULATED:    This screening LDL is a calculated result  It does not have the accuracy of the Direct Measured LDL in the monitoring of patients with hyperlipidemia and/or statin therapy  Direct Measure LDL (OLG747) must be ordered separately in these patients  Test Name Result Flag Reference   CHOLESTEROL 195 mg/dL     LDL CHOLESTEROL CALCULATED 101 mg/dL H 0-100   TRIGLYCERIDES 80 mg/dL  <=150   HDL,DIRECT 78 mg/dL H 40-60     (1) TSH WITH FT4 REFLEX [id number] 01:37PM Krystal Marcelino   Patients undergoing fluorescein dye angiography may retain small amounts of fluorescein in the body for 48-72 hours post procedure  Samples containing fluorescein can produce falsely depressed TSH values  If the patient had this procedure,a specimen should be resubmitted post fluorescein clearance          The recommended reference ranges for TSH during pregnancy are as follows:  First trimester 0 1 to 2 5 uIU/mL  Second trimester  0 2 to 3 0 uIU/mL  Third trimester 0 3 to 3 0 uIU/m     Test Name Result Flag Reference   TSH 2 070 uIU/mL  0 358-3 740

## 2018-01-11 NOTE — RESULT NOTES
Verified Results  (Q) CBC (INCLUDES DIFF/PLT) (REFL) [id number] 09:51AM [email or web address]     Test Name Result Flag Reference   WHITE BLOOD CELL COUNT 4 4 Thousand/uL  3 8-10 8   RED BLOOD CELL COUNT 4 20 Million/uL  3 80-5 10   HEMOGLOBIN 12 9 g/dL  11 7-15 5   HEMATOCRIT 38 9 %  35 0-45 0   MCV 92 6 fL  80 0-100 0   MCH 30 6 pg  27 0-33 0   MCHC 33 1 g/dL  32 0-36 0   RDW 14 7 %  11 0-15 0   PLATELET COUNT 465 Thousand/uL  140-400   MPV 8 7 fL  7 5-12 5   ABSOLUTE NEUTROPHILS 2860 cells/uL  4135-0019   ABSOLUTE LYMPHOCYTES 955 cells/uL  850-3900   ABSOLUTE MONOCYTES 312 cells/uL  200-950   ABSOLUTE EOSINOPHILS 251 cells/uL     ABSOLUTE BASOPHILS 22 cells/uL  0-200   NEUTROPHILS 65 0 %     LYMPHOCYTES 21 7 %     MONOCYTES 7 1 %     EOSINOPHILS 5 7 %     BASOPHILS 0 5 %       (Q) COMPREHENSIVE METABOLIC PNL W/ADJUSTED CALCIUM [id number] 09:51AM [email or web address]     Test Name Result Flag Reference   GLUCOSE 84 mg/dL  65-99   Fasting reference interval   UREA NITROGEN (BUN) 20 mg/dL  7-25   CREATININE 0 67 mg/dL  0 60-0 93   For patients >52years of age, the reference limit  for Creatinine is approximately 13% higher for people  identified as -American  eGFR NON-AFR   AMERICAN 84 mL/min/1 73m2  > OR = 60   eGFR AFRICAN AMERICAN 97 mL/min/1 73m2  > OR = 60   BUN/CREATININE RATIO   1-63   NOT APPLICABLE (calc)   SODIUM 141 mmol/L  135-146   POTASSIUM 4 2 mmol/L  3 5-5 3   CHLORIDE 107 mmol/L     CARBON DIOXIDE 29 mmol/L  20-31   CALCIUM 9 6 mg/dL  8 6-10 4   CALCIUM (ADJUSTED FOR$ALBUMIN) 9 8 mg/dL (calc)  8 6-10 2   PROTEIN, TOTAL 6 0 g/dL L 6 1-8 1   ALBUMIN 4 1 g/dL  3 6-5 1   GLOBULIN 1 9 g/dL (calc)  1 9-3 7   ALBUMIN/GLOBULIN RATIO 2 2 (calc)  1 0-2 5   BILIRUBIN, TOTAL 0 5 mg/dL  0 2-1 2   ALKALINE PHOSPHATASE 58 U/L     AST 19 U/L  10-35   ALT 9 U/L  6-29     (Q) HEMOGLOBIN A1c WITH eAG [id number] 09:51AM Baldemar Pulido   REPORT COMMENT:  FASTING:YES     Test Name Result Flag Reference   HEMOGLOBIN A1c 5 2 % of total Hgb  <5 7   For the purpose of screening for the presence of  diabetes:     <5 7%       Consistent with the absence of diabetes  5 7-6 4%    Consistent with increased risk for diabetes              (prediabetes)  > or =6 5%  Consistent with diabetes     This assay result is consistent with a decreased risk  of diabetes  Currently, no consensus exists regarding use of  hemoglobin A1c for diagnosis of diabetes in children  According to American Diabetes Association (ADA)  guidelines, hemoglobin A1c <7 0% represents optimal  control in non-pregnant diabetic patients  Different  metrics may apply to specific patient populations  Standards of Medical Care in Diabetes(ADA)  eAG (mg/dL) 103 (calc)     eAG (mmol/L) 5 7 (calc)       (Q) LIPID PANEL WITH REFLEX TO DIRECT LDL [id number] 09:51AM [facility]     Test Name Result Flag Reference   CHOLESTEROL, TOTAL 173 mg/dL  125-200   HDL CHOLESTEROL 81 mg/dL  > OR = 46   TRIGLICERIDES 49 mg/dL  <882   LDL-CHOLESTEROL 82 mg/dL (calc)  <130   Desirable range <100 mg/dL for patients with CHD or  diabetes and <70 mg/dL for diabetic patients with  known heart disease  CHOL/HDLC RATIO 2 1 (calc)  < OR = 5 0   NON HDL CHOLESTEROL 92 mg/dL (calc)     Target for non-HDL cholesterol is 30 mg/dL higher than   LDL cholesterol target       (Q) TSH, 3RD GENERATION W/REFLEX TO FT4 [id number] 09:51AM [facility]     Test Name Result Flag Reference   TSH W/REFLEX TO FT4 3 97 mIU/L  0 40-4 50

## 2018-01-13 VITALS
HEIGHT: 66 IN | WEIGHT: 143 LBS | SYSTOLIC BLOOD PRESSURE: 170 MMHG | DIASTOLIC BLOOD PRESSURE: 80 MMHG | RESPIRATION RATE: 14 BRPM | BODY MASS INDEX: 22.98 KG/M2 | HEART RATE: 78 BPM

## 2018-01-13 NOTE — RESULT NOTES
Message   iron level normal   Kidney function normal     Verified Results  (1) IRON SATURATION %, TIBC [id number] 12:17PM Dasha Oconnell   REPORT COMMENT:  FASTING:YES     Test Name Result Flag Reference   IRON, TOTAL 56 mcg/dL     IRON BINDING CAPACITY 296 mcg/dL (calc)  250-450   % SATURATION 19 % (calc)  11-50     (1) ISTAT, CREATININE [id number] 12:17PM Dasha Oconnell     Test Name Result Flag Reference   CREATININE 0 75 mg/dL  0 60-0 93   For patients >52years of age, the reference limit  for Creatinine is approximately 13% higher for people  identified as -American  eGFR NON-AFR   AMERICAN 76 mL/min/1 73m2  > OR = 60   eGFR AFRICAN AMERICAN 88 mL/min/1 73m2  > OR = 60

## 2018-01-13 NOTE — MISCELLANEOUS
History of Present Illness  [facility] Communication [facility]: She was hospitalized at [facility]  The date of admission: 3/29/16, date of discharge: 3/31/16  Diagnosis: S/p L2-S1 laminectomy  She was discharged to a rehabilitation center, Acute Rehab  She did not schedule a follow up appointment  Follow-up appointments with other specialists: Discharged to acute rehab facility  Communication performed and completed by Kt Flores      Active Problems    1  Abnormal chest x-ray (793 2) (R93 8)   2  Anxiety (300 00) (F41 9)   3  Back pain (724 5) (M54 9)   4  Chest pain (786 50) (R07 9)   5  Chronic osteomyelitis of left foot (730 17) (M86 672)   6  Deep vein thrombosis of left lower extremity (453 40) (I82 402)   7  Depression with anxiety (300 4) (F41 8)   8  Encounter for screening mammogram for malignant neoplasm of breast (V76 12)   (Z12 31)   9  Esophageal reflux (530 81) (K21 9)   10  Fibromyalgia (729 1) (M79 7)   11  Generalized pain (780 96) (R52)   12  Headache (784 0) (R51)   13  History of falling (V15 88) (Z91 81)   14  Hyperlipidemia (272 4) (E78 5)   15  Hypertension (401 9) (I10)   16  Hypothyroidism (244 9) (E03 9)   17  Impaired fasting glucose (790 21) (R73 01)   18  Ingrowing nail (703 0) (L60 0)   19  Insomnia (780 52) (G47 00)   20  Knee osteoarthritis (715 36) (M17 9)   21  Knee pain, left (719 46) (M25 562)   22  Left foot pain (729 5) (M79 672)   23  Leg pain, left (729 5) (M79 605)   24  Leg weakness (729 89) (M62 81)   25  Lumbar radiculopathy (724 4) (M54 16)   26  Lung mass (786 6) (R91 8)   27  Lyme disease (088 81) (A69 20)   28  Memory Lapses Or Loss (780 93)   29  Neck pain (723 1) (M54 2)   30  Need for prophylactic vaccination and inoculation against influenza (V04 81) (Z23)   31  Need for vaccination with 13-polyvalent pneumococcal conjugate vaccine (V03 82) (Z23)   32  Onychomycosis (110 1) (B35 1)   33  Pain in both lower extremities (729 5) (M79 604,M79 605)   34  Peripheral neuropathy, hereditary/idiopathic (356 9) (G60 9)   35  Pleural nodules (786 6) (R22 2)   36  Preop examination (V72 84) (Z01 818)   37  Spondylosis of lumbar region without myelopathy or radiculopathy (721 3) (M47 816)   38  Tinnitus (388 30) (H93 19)   39  Varicose veins (454 9) (I86 8)    Past Medical History    1  History of Abdominal adhesions (568 0) (K66 0)   2  History of Carpal tunnel syndrome, unspecified laterality (354 0) (G56 00)   3  History of CT Lung Pulmonary Nodule Solitary   4  History of Endometriosis (617 9) (N80 9)   5  History of Foot Pain (Soft Tissue) (729 5)   6  History of Ganglion (727 43) (M67 40)   7  History of anemia (V12 3) (Z86 2)   8  History of fatigue (V13 89) (Z87 898)   9  History of migraine (V12 49) (Z86 69)   10  History of peripheral neuropathy (V12 49) (Z86 69)   11  History of sciatica (V12 49) (Z86 69)   12  History of Joint Pain In The Right Knee   13  History of Loss Of Hair From Head   14  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)   15  History of Phoenix's Neuroma Of The Right Foot (355 6)   16  History of Paroxysmal atrial fibrillation (427 31) (I48 0)   17  History of Periorbital Eye Pain Left   18  History of Thrombophlebitis (V12 52)    Surgical History    1  History of Appendectomy   2  History of Biopsy Lymph Node   3  History of Breast Surgery Puncture Aspiration Of Cyst Left Breast   4  History of Catheter Ablation Atrial Fibrillation   5  History of Cholecystectomy   6  History of Colonoscopy (Fiberoptic)   7  History of Foot Surgery   8  History of Hand Surgery   9  History of Hand Surgery   10  History of Knee Arthroscopy   11  History of Total Abdominal Hysterectomy    Family History    1  Family history of Attention-deficit Hyperactivity Disorder   2  Family history of Stroke Syndrome (V17 1)    3  Family history of Prostate Cancer (V16 42)    4  Family history of Acute Myocardial Infarction (V17 3)    5   Family history of Cancer    Social History    · Being A Social Drinker   · Denied: Drug use (305 90) (F19 90)   · Never A Smoker    Current Meds   1  Aspirin 81 MG Oral Tablet; TAKE 1 TABLET DAILY; Therapy: (Recorded:14Oct2015) to Recorded   2  Biotin 5000 MCG Oral Tablet; TAKE AS DIRECTED; Therapy: (Yury Huber) to Recorded   3  Centrum Silver Oral Tablet; TAKE 1 TABLET DAILY; Therapy: (Yury Huber) to Recorded   4  Chlorella 500 MG Oral Capsule; Therapy: (Yury Huber) to Recorded   5  Chlorthalidone 25 MG Oral Tablet; Therapy: (Yury Huber) to Recorded   6  Cyclobenzaprine HCl - 10 MG Oral Tablet; TAKE 1/2 TO ONE TABLET BY MOUTH AT   BEDTIME; Therapy: 14Fvm0917 to (Toro Huerta)  Requested for: 71UEA8488; Last   Rx:29Mar2016 Ordered   7  Cymbalta 30 MG Oral Capsule Delayed Release Particles; TAKE 3 CAPSULES DAILY; Therapy: 78XNV6614 to (Evaluate:11Oct2014)  Requested for: 21Mar2016; Last   Rx:15Apr2014 Ordered   8  Gabapentin 300 MG Oral Capsule; take 2 capsule 3 times daily; Therapy: 22Mwz3229 to (Evaluate:01Jun2016)  Requested for: 21Mar2016; Last   Rx:03Mar2016 Ordered   9  Levothyroxine Sodium 88 MCG Oral Tablet; take 1 tablet every day; Therapy: 00QOB5518 to (Ghanshyam Ray)  Requested for: 21Mar2016; Last   Rx:38Nto9578 Ordered   10  Lisinopril 10 MG Oral Tablet; TAKE 1 TABLET DAILY; Therapy: 60VNK0696 to (Evaluate:18Oct2016)  Requested for: 21Mar2016; Last    Rx:25Oct2015 Ordered   11  Ocuvite Oral Tablet; Therapy: (Yury Huber) to Recorded   12  Oxycodone-Acetaminophen 5-325 MG Oral Tablet; TAKE 1 TABLET Every twelve hours    PRN pain; Therapy: 38YOW4377 to (Evaluate:21Xkw7788); Last Rx:13Jan2016 Ordered   13  Potassium Chloride 20 MEQ TBCR; Therapy: (Yury Huber) to Recorded   14  Senna Plus TABS; TAKE 1 TABLET DAILY AS NEEDED; Therapy: (Yury Huber) to Recorded   15  Topiramate 50 MG Oral Tablet;  Take 1 tablet by mouth  daily; Therapy: 05ALS8664 to (Jed Lundborg)  Requested for: 21Mar2016; Last    Rx:13Zym8460 Ordered   16  Vitamin D3 2000 UNIT Oral Tablet; Take 1 tablet daily; Therapy: (Carlos Alberto Records) to Recorded   17  Zetia 10 MG Oral Tablet; Take 1 tablet daily; Therapy: 23RPX9480 to (Damaso Tim)  Requested for: 21Mar2016; Last    Rx:03Mar2016 Ordered    Allergies    1  Cranberry POWD   2  Voltaren    3  Latex   4  Soy  Denied    5  Coumadin TABS   6  Hyzaar TABS   7  Niacin TBCR   8  Norvasc TABS    Future Appointments    Date/Time Provider Specialty Site   05/24/2016 04:00 PM Hoda Bishop MD Family Medicine [address] [place] [address] Drive   04/11/2016 02:30 PM Cristiano Livingston, DO Orthopedic Surgery [facility]     Signatures   Electronically signed by :  Melony Meyer MD; Apr 4 2016  2:03PM EST                       (Review)

## 2018-01-14 VITALS
BODY MASS INDEX: 22.02 KG/M2 | HEART RATE: 60 BPM | WEIGHT: 137 LBS | HEIGHT: 66 IN | RESPIRATION RATE: 16 BRPM | DIASTOLIC BLOOD PRESSURE: 72 MMHG | SYSTOLIC BLOOD PRESSURE: 118 MMHG | TEMPERATURE: 98.1 F

## 2018-01-14 VITALS
HEART RATE: 60 BPM | RESPIRATION RATE: 16 BRPM | SYSTOLIC BLOOD PRESSURE: 140 MMHG | DIASTOLIC BLOOD PRESSURE: 60 MMHG | BODY MASS INDEX: 22.36 KG/M2 | WEIGHT: 136.44 LBS

## 2018-01-15 VITALS
HEIGHT: 66 IN | RESPIRATION RATE: 16 BRPM | WEIGHT: 141.8 LBS | SYSTOLIC BLOOD PRESSURE: 140 MMHG | DIASTOLIC BLOOD PRESSURE: 90 MMHG | HEART RATE: 64 BPM | BODY MASS INDEX: 22.79 KG/M2 | TEMPERATURE: 97.3 F

## 2018-01-17 NOTE — RESULT NOTES
Message   CT chest showed stable lesion in left upper lung  Please ask pt to follow up with pulmonary as discussed in OV  Verified Results  CT CHEST W CONTRAST [id number] 06:19PM Alba Kusum Order Number: [id number]    - Patient Instructions: To schedule this appointment, please contact Central Scheduling at [phone]   Order Number: [id number]    - Patient Instructions: To schedule this appointment, please contact Central Scheduling at [phone]  Test Name Result Flag Reference   CT CHEST W CONTRAST (Report)     CT CHEST WITH IV CONTRAST     INDICATION: Follow-up left upper lobe pleural-based soft tissue density  COMPARISON: CT chest dated August 13, 2015  PET/CT scan dated September 23, 2015  TECHNIQUE: CT examination of the chest was performed  85 mL of Omnipaque 350 was injected intravenously  Axial, sagittal and coronal reformatted images were submitted for interpretation  Coronal thick section MIP (maximal intensity projection) images    were also created  This examination, like all CT scans performed in the [facility], was performed utilizing techniques to minimize radiation dose exposure, including the use of iterative reconstruction and automated exposure control  FINDINGS:     LUNGS: Again noted is an approximately 18 x 7 mm pleural-based soft tissue density within the left upper lung abutting the anterolateral left 5th rib  The size and appearance of this enhancing lesion has not significantly changed from the prior exam    There is a stable 1 mm left lower lobe lung nodule (series 3, image 48)  No new lung nodules are identified  A few other scattered subcentimeter bilateral lower lobe pulmonary nodules are unchanged  PLEURA: Unremarkable  HEART/GREAT VESSELS: Unremarkable for patient's age  MEDIASTINUM AND ANNIE: Unremarkable  CHEST WALL AND LOWER NECK: Unremarkable       VISUALIZED STRUCTURES IN THE UPPER ABDOMEN: Scattered probable hepatic cysts are unchanged the largest located within the left hepatic lobe measuring 16 mm  The gallbladder surgically absent  OSSEOUS STRUCTURES: No acute fracture  No destructive osseous lesion  IMPRESSION:     Stable pleural-based enhancing lesion in the left upper lung, adjacent to the anterior left 5th rib when compared to a CT scan of the chest dated August 13, 2015  No mediastinal, hilar or axillary lymphadenopathy         Workstation performed: [id number]     Signed by:   Enmanuel Randle MD   8/29/16

## 2018-03-27 DIAGNOSIS — G43.709 CHRONIC MIGRAINE WITHOUT AURA WITHOUT STATUS MIGRAINOSUS, NOT INTRACTABLE: Primary | ICD-10-CM

## 2018-03-27 RX ORDER — TOPIRAMATE 50 MG/1
50 TABLET, FILM COATED ORAL DAILY
Qty: 90 TABLET | Refills: 3 | Status: SHIPPED | OUTPATIENT
Start: 2018-03-27 | End: 2019-01-21 | Stop reason: SDUPTHER

## 2018-04-04 ENCOUNTER — OFFICE VISIT (OUTPATIENT)
Dept: NEUROLOGY | Facility: CLINIC | Age: 78
End: 2018-04-04
Payer: MEDICARE

## 2018-04-04 VITALS
BODY MASS INDEX: 22.18 KG/M2 | DIASTOLIC BLOOD PRESSURE: 84 MMHG | HEART RATE: 54 BPM | WEIGHT: 138 LBS | HEIGHT: 66 IN | SYSTOLIC BLOOD PRESSURE: 126 MMHG

## 2018-04-04 DIAGNOSIS — M54.16 RADICULOPATHY, LUMBAR REGION: ICD-10-CM

## 2018-04-04 DIAGNOSIS — G62.9 NEUROPATHY: Primary | ICD-10-CM

## 2018-04-04 PROCEDURE — 99213 OFFICE O/P EST LOW 20 MIN: CPT | Performed by: PSYCHIATRY & NEUROLOGY

## 2018-04-04 RX ORDER — ASPIRIN 81 MG/1
81 TABLET ORAL DAILY
COMMUNITY
End: 2022-05-29

## 2018-04-04 RX ORDER — DULOXETIN HYDROCHLORIDE 60 MG/1
60 CAPSULE, DELAYED RELEASE ORAL DAILY
COMMUNITY
End: 2022-03-07 | Stop reason: SDUPTHER

## 2018-04-04 RX ORDER — LISINOPRIL 10 MG/1
10 TABLET ORAL DAILY
COMMUNITY
End: 2019-08-12 | Stop reason: SDUPTHER

## 2018-04-04 NOTE — PROGRESS NOTES
Patient ID: Elaina Slainas is a [de-identified] y o  female  Assessment/Plan: This is a [de-identified] y/o F who is here as a follow up of neuropathy  Her neuropathy has been stable  Does not want to make any adjustments to her medication regimen at this time  PLAN:  -Continue with Cymbalta 60mg for now for neuropathy    -continue with gabapentin 300mg at bedtime    -shes takes neuroquil (herbal) early PM and she notices a difference with her symptoms    -continue with topamax for migraine  Follow up with Dr Claudell Carmin in 6 months  Problem List Items Addressed This Visit     Neuropathy - Primary    Radiculopathy, lumbar region             Subjective:    HPI    This is a [de-identified] y/o F who is here as a patient with neuropathy follow up  She had lyme disease in 2015 and since then had neuropathy  She has been seeing chiropractor and uses electrical stimulation and she went through that and it did not help her neuropathy  She has left leg tingling/numbness and she felt like it was burning  She said big toe is still sensitive on the left  When she is really tired, she feels like it comes back  Buzzing of the nerves comes at night and has trouble sleeping at night  She was intially taking Gabapentin 300mg TID and she did not tolerate it at that time, and switched to 300mg at bedtime  She takes neuroquil early PM and it does seem to help her symptoms  It does seem to help her with sleep  The following portions of the patient's history were reviewed and updated as appropriate:   She  has a past medical history of CTS (carpal tunnel syndrome); DVT (deep venous thrombosis) ([facility] [address] ); Endometriosis; Fibromyalgia; Hypercholesteremia; Hypertension; Hypothyroidism; Lyme disease; Migraine; PAF (paroxysmal atrial fibrillation) ([address] ); Peripheral neuropathy; and Solitary pulmonary nodule on lung CT    She   Patient Active Problem List    Diagnosis Date Noted    Neuropathy 04/04/2018    Radiculopathy, lumbar region 04/04/2018    S/P Lumbar laminectomy and decompressio L2-S1 03/31/2016    Anxiety 03/30/2016    Depression 03/30/2016    Hypertension 03/30/2016    Fibromyalgia     Hypercholesteremia     Migraine     Lumbar stenosis with neurogenic claudication 03/29/2016     She  has a past surgical history that includes Appendectomy; Breast surgery (Left); Cholecystectomy; Abdominal hysterectomy; IVC FILTER INSERTION; pr arthrodesis posterior/posterolateral lumbar (N/A, 3/29/2016); Lymph node biopsy; Atrial ablation surgery; Colonoscopy; Foot surgery (Bilateral); Hand surgery; Hand surgery; Knee arthroscopy (Left); and Total abdominal hysterectomy  Her family history includes ADD / ADHD in her father; Cancer in her brother and family; Heart attack in her maternal grandmother; Heart disease in her father; Stroke in her father  She  reports that she has never smoked  She has never used smokeless tobacco  She reports that she does not drink alcohol or use drugs  Current Outpatient Prescriptions   Medication Sig Dispense Refill    acetaminophen (TYLENOL) 325 mg tablet Take 1 to 2 tablets as needed for pain  30 tablet 0    aspirin (ECOTRIN LOW STRENGTH) 81 mg EC tablet Take 81 mg by mouth daily      Cholecalciferol (VITAMIN D3) 2000 UNITS capsule Take 2,000 Units by mouth daily   DULoxetine (CYMBALTA) 30 mg delayed release capsule Take 3 capsules (90 mg dose) daily  (Patient taking differently: 30 mg daily Take 3 capsules (90 mg dose) daily  ) 30 capsule 0    DULoxetine (CYMBALTA) 60 mg delayed release capsule Take 60 mg by mouth daily      ezetimibe (ZETIA) 10 mg tablet Take 10 mg by mouth daily   ferrous gluconate (FERGON) 324 mg tablet Take 1 tablet daily 30 tablet 0    gabapentin (NEURONTIN) 300 mg capsule Take 2 tablets (600 mg) three times a day  180 capsule 0    levothyroxine 75 mcg tablet Take 88 mcg by mouth daily        lisinopril (ZESTRIL) 10 mg tablet Take 10 mg by mouth daily      Menthol 5 4 MG LOZG Take 1 lozenge every 2 hours as needed  0    Multiple Vitamin (MULTIVITAMIN) tablet Take 1 tablet by mouth daily   Multiple Vitamins-Minerals (OCUVITE EXTRA PO) Take by mouth      topiramate (TOPAMAX) 50 MG tablet Take 1 tablet (50 mg total) by mouth daily 90 tablet 3    gabapentin (NEURONTIN) 100 mg capsule Take 1 tablet at bedtime  30 capsule 0    oxyCODONE (ROXICODONE) 5 mg immediate release tablet Earliest Fill Date: 4/12/16  Take 1 to 2 tablets every 4 to 6 hours as needed for pain for continuation of therapy  60 tablet 0    senna (SENOKOT) 8 6 mg Take 1 tablet at bedtime  60 tablet 0     No current facility-administered medications for this visit  Current Outpatient Prescriptions on File Prior to Visit   Medication Sig    acetaminophen (TYLENOL) 325 mg tablet Take 1 to 2 tablets as needed for pain   Cholecalciferol (VITAMIN D3) 2000 UNITS capsule Take 2,000 Units by mouth daily   DULoxetine (CYMBALTA) 30 mg delayed release capsule Take 3 capsules (90 mg dose) daily  (Patient taking differently: 30 mg daily Take 3 capsules (90 mg dose) daily  )    ezetimibe (ZETIA) 10 mg tablet Take 10 mg by mouth daily   ferrous gluconate (FERGON) 324 mg tablet Take 1 tablet daily    gabapentin (NEURONTIN) 300 mg capsule Take 2 tablets (600 mg) three times a day   levothyroxine 75 mcg tablet Take 88 mcg by mouth daily   Menthol 5 4 MG LOZG Take 1 lozenge every 2 hours as needed   Multiple Vitamin (MULTIVITAMIN) tablet Take 1 tablet by mouth daily   topiramate (TOPAMAX) 50 MG tablet Take 1 tablet (50 mg total) by mouth daily    gabapentin (NEURONTIN) 100 mg capsule Take 1 tablet at bedtime   oxyCODONE (ROXICODONE) 5 mg immediate release tablet Earliest Fill Date: 4/12/16  Take 1 to 2 tablets every 4 to 6 hours as needed for pain for continuation of therapy   senna (SENOKOT) 8 6 mg Take 1 tablet at bedtime  No current facility-administered medications on file prior to visit        She is allergic to cranberry juice powder; latex; soybean-containing drug products; and voltaren [diclofenac sodium]            Objective:    Blood pressure 126/84, pulse (!) 54, height 5' 6" (1 676 m), weight 62 6 kg (138 lb)  Physical Exam  General - alert, awake  Speech - fluent, no dysarthria noted    Neurological Exam  Cranial nerves: PERRL, EOMI, no facial droop noted, +shoulder shrug present  Motor - wears a foot brace on the left leg  ROS:    Review of Systems   Constitutional: Positive for fatigue  HENT: Negative  Eyes: Negative  Respiratory: Negative  Cardiovascular: Negative  Gastrointestinal: Negative  Endocrine: Negative  Genitourinary: Negative  Musculoskeletal: Positive for back pain, gait problem and neck pain  Allergic/Immunologic: Negative  Neurological: Positive for headaches  Hematological: Bruises/bleeds easily  Psychiatric/Behavioral: The patient is nervous/anxious

## 2018-04-20 DIAGNOSIS — D64.9 ANEMIA: ICD-10-CM

## 2018-04-20 DIAGNOSIS — E78.5 HYPERLIPIDEMIA: ICD-10-CM

## 2018-04-20 DIAGNOSIS — R73.01 IMPAIRED FASTING GLUCOSE: ICD-10-CM

## 2018-04-20 DIAGNOSIS — M79.7 FIBROMYALGIA: ICD-10-CM

## 2018-04-20 DIAGNOSIS — E03.9 HYPOTHYROIDISM: ICD-10-CM

## 2018-04-20 DIAGNOSIS — I10 ESSENTIAL (PRIMARY) HYPERTENSION: ICD-10-CM

## 2018-04-20 DIAGNOSIS — F41.8 OTHER SPECIFIED ANXIETY DISORDERS: ICD-10-CM

## 2018-04-20 DIAGNOSIS — G60.9 HEREDITARY AND IDIOPATHIC NEUROPATHY: ICD-10-CM

## 2018-04-26 LAB
CHOLEST SERPL-MCNC: 193 MG/DL
CHOLEST/HDLC SERPL: 2.5 (CALC)
HBA1C MFR BLD: 5.3 % OF TOTAL HGB
HDLC SERPL-MCNC: 78 MG/DL
LDLC SERPL CALC-MCNC: 100 MG/DL (CALC)
NONHDLC SERPL-MCNC: 115 MG/DL (CALC)
TRIGL SERPL-MCNC: 64 MG/DL
TSH SERPL-ACNC: 2.29 MIU/L (ref 0.4–4.5)

## 2018-05-01 ENCOUNTER — OFFICE VISIT (OUTPATIENT)
Dept: FAMILY MEDICINE CLINIC | Facility: CLINIC | Age: 78
End: 2018-05-01
Payer: MEDICARE

## 2018-05-01 VITALS
RESPIRATION RATE: 16 BRPM | SYSTOLIC BLOOD PRESSURE: 180 MMHG | HEART RATE: 60 BPM | DIASTOLIC BLOOD PRESSURE: 90 MMHG | TEMPERATURE: 97.8 F | WEIGHT: 134.8 LBS | HEIGHT: 65 IN | BODY MASS INDEX: 22.46 KG/M2

## 2018-05-01 DIAGNOSIS — I10 ESSENTIAL HYPERTENSION: Chronic | ICD-10-CM

## 2018-05-01 DIAGNOSIS — Z13.820 SCREENING FOR OSTEOPOROSIS: ICD-10-CM

## 2018-05-01 DIAGNOSIS — E78.5 HYPERLIPIDEMIA, UNSPECIFIED HYPERLIPIDEMIA TYPE: ICD-10-CM

## 2018-05-01 DIAGNOSIS — Z00.00 MEDICARE ANNUAL WELLNESS VISIT, SUBSEQUENT: Primary | ICD-10-CM

## 2018-05-01 DIAGNOSIS — A69.20 LYME DISEASE: ICD-10-CM

## 2018-05-01 DIAGNOSIS — R73.01 IMPAIRED FASTING GLUCOSE: ICD-10-CM

## 2018-05-01 DIAGNOSIS — E55.9 VITAMIN D DEFICIENCY: ICD-10-CM

## 2018-05-01 DIAGNOSIS — E03.9 HYPOTHYROIDISM, UNSPECIFIED TYPE: ICD-10-CM

## 2018-05-01 PROCEDURE — G0439 PPPS, SUBSEQ VISIT: HCPCS | Performed by: FAMILY MEDICINE

## 2018-05-01 PROCEDURE — 99214 OFFICE O/P EST MOD 30 MIN: CPT | Performed by: FAMILY MEDICINE

## 2018-05-01 RX ORDER — LEVOTHYROXINE SODIUM 88 UG/1
TABLET ORAL
Qty: 90 TABLET | Refills: 3
Start: 2018-05-01 | End: 2018-08-02 | Stop reason: SDUPTHER

## 2018-05-01 NOTE — PROGRESS NOTES
Chief Complaint   [facility]   [facility]     Annual wellness visit   Follow-up     6 month follow up for Anemia, Depression with anxiety, Fibromyalgia, Hyperlipidemia, Hypertension, Hypothyroidism, Impaired fasting glucose, and Peripheral neuropathy, hereditary/idiopathic  HPI:  Neo Villalobos is a [de-identified] y o  female here for her Subsequent Wellness Visit      Patient Active Problem List   Diagnosis    Lumbar stenosis with neurogenic claudication    Anxiety    Depression    Hypertension    Fibromyalgia    Hypercholesteremia    Migraine    S/P Lumbar laminectomy and decompressio L2-S1    Neuropathy    Radiculopathy, lumbar region     Past Medical History:   Diagnosis Date    CTS (carpal tunnel syndrome)     unspecified laterality    DVT (deep venous thrombosis) ([facility])     left leg, s/p IVC filter 11/2015    Endometriosis     Fibromyalgia     Hypercholesteremia     Hypertension     Hypothyroidism     Lyme disease     treated 8/2015    Migraine     PAF (paroxysmal atrial fibrillation) ([facility])     s/p [facility] (sees Dr Garry Curiel at [facility])   Vena Gurmeet Peripheral neuropathy     Solitary pulmonary nodule on lung CT      Past Surgical History:   Procedure Laterality Date    ABDOMINAL HYSTERECTOMY      APPENDECTOMY      ATRIAL ABLATION SURGERY      cath    BREAST SURGERY Left     puncture aspiration of cyst onset 1972    CHOLECYSTECTOMY      onset 1981    COLONOSCOPY      fiberoptic    FOOT SURGERY Bilateral     onset 1993- removal of mortons neuroma    HAND SURGERY      gaglion cyst removal    HAND SURGERY      onset 1991 - carpal tunnel    IVC FILTER INSERTION      KNEE ARTHROSCOPY Left     therapeutic     LYMPH NODE BIOPSY      1996 onset    [place] ARTHRODESIS POSTERIOR/POSTEROLATERAL LUMBAR N/A 3/29/2016    Procedure: L2-S1 LAMINECTOMY;  Surgeon: Dimple Taylor DO;  [facility];  [facility]      onset 1989 Family History   Problem Relation Age of Onset    Heart disease Father     ADD / ADHD Father     Stroke Father     Cancer Brother      prostate    Heart attack Maternal Grandmother      acute MI    Cancer Family      History   Smoking Status    Never Smoker   Smokeless Tobacco    Never Used     History   Alcohol Use No     Comment: social as per [employer]      History   Drug Use No     There were no vitals taken for this visit  Current Outpatient Prescriptions   Medication Sig Dispense Refill    acetaminophen (TYLENOL) 325 mg tablet Take 1 to 2 tablets as needed for pain  30 tablet 0    aspirin (ECOTRIN LOW STRENGTH) 81 mg EC tablet Take 81 mg by mouth daily      Cholecalciferol (VITAMIN D3) 2000 UNITS capsule Take 2,000 Units by mouth daily   DULoxetine (CYMBALTA) 30 mg delayed release capsule Take 3 capsules (90 mg dose) daily  (Patient taking differently: 30 mg daily Take 3 capsules (90 mg dose) daily  ) 30 capsule 0    DULoxetine (CYMBALTA) 60 mg delayed release capsule Take 60 mg by mouth daily      ezetimibe (ZETIA) 10 mg tablet Take 10 mg by mouth daily   ferrous gluconate (FERGON) 324 mg tablet Take 1 tablet daily 30 tablet 0    gabapentin (NEURONTIN) 100 mg capsule Take 1 tablet at bedtime  30 capsule 0    gabapentin (NEURONTIN) 300 mg capsule Take 2 tablets (600 mg) three times a day  180 capsule 0    levothyroxine 75 mcg tablet Take 88 mcg by mouth daily   lisinopril (ZESTRIL) 10 mg tablet Take 10 mg by mouth daily      Menthol 5 4 MG LOZG Take 1 lozenge every 2 hours as needed  0    Multiple Vitamin (MULTIVITAMIN) tablet Take 1 tablet by mouth daily   Multiple Vitamins-Minerals (OCUVITE EXTRA PO) Take by mouth      oxyCODONE (ROXICODONE) 5 mg immediate release tablet Earliest Fill Date: 4/12/16  Take 1 to 2 tablets every 4 to 6 hours as needed for pain for continuation of therapy  60 tablet 0    senna (SENOKOT) 8 6 mg Take 1 tablet at bedtime   60 tablet 0    topiramate (TOPAMAX) 50 MG tablet Take 1 tablet (50 mg total) by mouth daily 90 tablet 3     No current facility-administered medications for this visit        Allergies   Allergen Reactions    Cranberry Juice Powder     Latex      Other reaction(s): Rash and itching    Soybean-Containing Drug Products     Voltaren [Diclofenac Sodium]      Immunization History   Administered Date(s) Administered    Influenza Split High Dose Preservative Free IM 10/14/2015, 10/21/2016, 10/30/2017    Influenza TIV (IM) 11/20/2012    Pneumococcal Conjugate 13-Valent 10/14/2015    Pneumococcal Polysaccharide PPV23 01/01/2007, 04/27/2017       Patient Care Team:  Chetan Singleton MD as PCP - MD Ash Sullivan, MD Arnulfo Flor MD Alyce Heck, MD Aniceto Lev, MD Carlyon Gibbs, Aislinn Rollins MD    Medicare Screening Tests and Risk Assessments:  AWV Clinical     ISAR:   Previous hospitalizations?:  No       Once in a Lifetime Medicare Screening:       Medicare Screening Tests and Risk Assessment:   AAA Risk Assessment     Family history of AAA:  No   Osteoporosis Risk Assessment     Female:  Yes   :  Yes    HIV Risk Assessment        Drug and Alcohol Use:   Tobacco use    Cigarettes:  never smoker    Tobacco use duration    Tobacco Cessation Readiness    Alcohol use    Alcohol use:  occasional use    Amount of alcohol consumed:  less than a 1/4 per month    Alcohol Treatment Readiness   Illicit Drug Use    Drug use:  never        Diet & Exercise:   Diet   What is your diet?:  Regular, Low Saturated Fat, Limited junk food, Low Carb   How many servings a day of the following:   Fruits and Vegetables:  3-4 Meat:  1-2   Whole Grains:  2 Simple Carbs:  2    Soda:  0   Coffee:  2 Tea:  0   Exercise    Do you currently exercise?:  yes    Frequency:  occasional       Cognitive Impairment Screening:   Depression screening preformed:  Yes     PHQ-9 Depression scale score:  4   Cognitive Impairment Screening        Functional Ability/Level of Safety:   Hearing     Bilateral:  significantly decreased   Hearing aid:  Yes    Hearing Impairment Assessment    Current Activities    Status:  limited ADL's, limited social activities, limited driving   Help needed with the folllowing:    Doing Housework: Yes    ADL    Fall Risk    Are you unsteady on your feet?:  Yes   Injury History       Home Safety:   Home Safety Risk Factors       Advanced Directives:   Advanced Directives    Living Will:  No Durable POA for healthcare:  No   Advanced directive:  No    Patient's End of Life Decisions        Urinary Incontinence:       Glaucoma:            Provider Screening     Preventative Screening/Counseling:   Cardiovascular Screening/Counseling:   (Labs Q5 years, EKG optional one-time)         Diabetes Screening/Counseling:   (2 tests/year if Pre-Diabetes or 1 test/year if no Diabetes)         Colorectal Cancer Screening/Counseling:   (FOBT Q1 yr; Flex Sig Q4 yrs or Q10 yrs after Screening Colonoscopy; Screening Colonoscpy Q2 yrs High Risk or Q10 yrs Low Risk; Barium Enema Q2 yrs High Risk or Q4 yrs Low Risk)         Prostate Cancer Screening/Counseling:   (Annual)          Breast Cancer Screening/Counseling:   (Baseline Age 28 - 43; Annual Age 36+)         Cervical Cancer Screening/Counseling:   (Annual for High Risk or Childbearing Age with Abnormal Pap in Last 3 yrs; Every 2 all others)         Osteoporosis Screening/Counseling:   (Every 2 Yrs if at risk or more if medically necessary)         AAA Screening/Counseling:   (Once per Lifetime with risk factors)    Family History of AAA:  No           Glaucoma Screening/Counseling:   (Annual)         HIV Screening/Counseling:   (Voluntary; Once annually for high risk OR 3 times for Pregnancy at diagnosis of IUP; 3rd trimester; and at Labor         Hepatitis C Screening:             Immunizations:        Other Preventative Couseling (Non-Medicare Wellness Visit Required):       Referrals (Non-Medicare Wellness Visit Required):       Medical Equipment/Suppliers:           No exam data present    Physical Exam :  Physical Exam    Reviewed Updated [facility] Prior Wellness Visits:   Last Medicare wellness visit information was reviewed, patient interviewed , no change since last AWVyes  Last Medicare wellness visit information was reviewed, patient interviewed and updates made to the record today yes    Assessment and Plan:  1  Medicare annual wellness visit, subsequent     2  Essential hypertension  CBC    Comprehensive metabolic panel    Not controlled today  Pt denies symptoms  Advised pt to check BP at home  Call office if BP always >140/90  Pt understood  3  Hypothyroidism, unspecified type  levothyroxine 88 mcg tablet    TSH, 3rd generation    Continue levothroxine 88 mcg daily  4  Impaired fasting glucose  HEMOGLOBIN A1C W/ EAG ESTIMATION    controlled well  Low carb diet  5  Hyperlipidemia, unspecified hyperlipidemia type  Lipid panel    controlled well  continue zetia 10mg QD  6  Lyme disease      FU Dr Smith oJshua  7  Vitamin D deficiency  Vitamin D 25 hydroxy    Continue vitD 2000IU daily  8  Screening for osteoporosis  DXA bone density spine hip and pelvis     MMSE 30/30 today  Give package of "advance directive" and "My five wish"       Health Maintenance Due   Topic Date Due    DTaP,Tdap,and Td Vaccines (1 - Tdap) [birth year]/23/1959    Urinary Incontinence Screening  03/23/2003    GLAUCOMA SCREENING 67+ YR  03/23/2005    Annual [facility] Visit (AWV)  04/27/2018

## 2018-05-01 NOTE — PROGRESS NOTES
Chief Complaint   Patient presents with   [facility]     Annual wellness visit   Follow-up     6 month follow up for Anemia, Depression with anxiety, Fibromyalgia, Hyperlipidemia, Hypertension, Hypothyroidism, Impaired fasting glucose, and Peripheral neuropathy, hereditary/idiopathic  Health Maintenance   Topic Date Due    DTaP,Tdap,and Td Vaccines (1 - Tdap) 03/23/1959    Urinary Incontinence Screening  03/23/2003    GLAUCOMA SCREENING 67+ YR  03/23/2005    Annual Wellnes Visit (AWV)  04/27/2018    INFLUENZA VACCINE  09/01/2018    TSH LEVEL  04/25/2019    HEMOGLOBIN A1C  04/25/2019    Fall Risk  05/01/2019    Depression Screening PHQ-9  05/01/2019    PNEUMOCOCCAL POLYSACCHARIDE VACCINE AGE 72 AND OVER  Completed     Assessment/Plan:    BP elevated in office today  Pt denies symptoms like headache, vision change, SOB or chest pain  DASH diet  Check BP at home 2/day and call office if BP always >140/90  Go to ER if SOB, chest pain  Reviewed lab in 4/2018  HgA1C 5 3 normal  Lipid 193/64/78/100 normal  TSH normal  Cont current medications  FU specialists as scheduled  Got Prevnar 10/2015  Got Pneumovax 2017    3/2014 Dexa normal  Give script again  Fall precautions  Pt refused PT   RTO in 6 months with labs  Diagnoses and all orders for this visit:    Medicare annual wellness visit, subsequent    Essential hypertension  Comments:  Not controlled today  Pt denies symptoms  Advised pt to check BP at home  Call office if BP always >140/90  Pt understood  Orders:  -     CBC; Future  -     Comprehensive metabolic panel; Future    Hypothyroidism, unspecified type  Comments:  Continue levothroxine 88 mcg daily  Orders:  -     levothyroxine 88 mcg tablet; 1 tab daily  -     TSH, 3rd generation; Future    Impaired fasting glucose  Comments:  controlled well  Low carb diet  Orders:  -     HEMOGLOBIN A1C W/ EAG ESTIMATION;  Future    Hyperlipidemia, unspecified hyperlipidemia type  Comments:  controlled well  continue zetia 10mg QD  Orders:  -     Lipid panel; Future    Lyme disease  Comments:  FU Dr Veneda Runner  Vitamin D deficiency  Comments:  Continue vitD 2000IU daily  Orders:  -     Vitamin D 25 hydroxy; Future    Screening for osteoporosis  -     DXA bone density spine hip and pelvis; Future          Subjective:      Patient ID: Suleiman Hensley is a [de-identified] y o  female  HPI    Pt is here by herself  HTN---She is on lisinopril 10mg daily now  Does not check BP at home  Today /90  Pt denies headache, vision change, SOB or CP  FU cardiology Dr Facundo Britt for Afib s/p ablation 2006 yearly  Denies chest pain, palpitation etc  She is on ASA 81mg daily  Hyperlipidemia---she is on zetia 10mg daily  IFG---4/2018 hgA1C 5 3 normal    Hypothyroidism---On levothyroxine 88 mcg daily  Feels OK  VitD deficiency---She is on vitD 2000IU daily  Fibromyalgia----Feels tired always  FU rheumatology Dr Vásquez  for fibromyalgia/lyme disease  On cymbalta 30mg am and 60mg pm      Lyme disease---Pain in chest, abdomen  Come and go  Pt states she follows chiopractor/functional medicine Dr Veneda Runner for her neuropathy/lyme disease  Pt states she is following a "salt and vitC protocol for lyme disease" for 8 months  Plan to recheck lyme disease later  FU orthopedics for lumbar radiculopathy and lumbar spinal stenosis s/p L2-S2 laminectomy  FU neurology for headache/neuropathy  On gabapentin 900mg qhs which helped little  She is on topamax 50mg daily which helped  FU opthalmology yearly  Has hearing aid now  Live with , 5 dog and 8 cats  Does all ADL's  Had balance problems  She uses cane/walker  Denies depression           The following portions of the patient's history were reviewed and updated as appropriate: allergies, current medications, past family history, past medical history, past social history, past surgical history and problem list     Review of Systems   Constitutional: Negative for appetite change, chills and fever  HENT: Negative for congestion, ear pain, sinus pain and sore throat  Eyes: Negative for discharge and itching  Respiratory: Negative for apnea, cough, chest tightness, shortness of breath and wheezing  Cardiovascular: Negative for chest pain, palpitations and leg swelling  Gastrointestinal: Negative for abdominal pain, anal bleeding, constipation, diarrhea, nausea and vomiting  Endocrine: Negative for cold intolerance, heat intolerance and polyuria  Genitourinary: Negative for difficulty urinating and dysuria  Musculoskeletal: Positive for gait problem  Negative for arthralgias, back pain and myalgias  Skin: Negative for rash  Neurological: Negative for dizziness and headaches  Psychiatric/Behavioral: Negative for agitation  Objective:      BP (!) 180/90 (BP Location: Left arm, Patient Position: Sitting, Cuff Size: Standard)   Pulse 60   Temp 97 8 °F (36 6 °C) (Oral)   Resp 16   Ht 5' 4 5" (1 638 m)   Wt 61 1 kg (134 lb 12 8 oz)   BMI 22 78 kg/m²          Physical Exam   Constitutional: She appears well-developed  No distress  HENT:   Head: Normocephalic and atraumatic  Right Ear: External ear normal    Left Ear: External ear normal    Nose: Nose normal    Mouth/Throat: Oropharynx is clear and moist    Eyes: Conjunctivae are normal  Pupils are equal, round, and reactive to light  Right eye exhibits no discharge  Left eye exhibits no discharge  Neck: Normal range of motion  No thyromegaly present  Cardiovascular: Normal rate, regular rhythm and normal heart sounds  Exam reveals no gallop and no friction rub  No murmur heard  Pulmonary/Chest: Effort normal and breath sounds normal  No respiratory distress  She has no wheezes  She has no rales  She exhibits no tenderness  Abdominal: Soft   Bowel sounds are normal    Musculoskeletal:   Use walker, use brace for left foot drop   Lymphadenopathy: She has no cervical adenopathy  Neurological: She is alert  Psychiatric: She has a normal mood and affect

## 2018-07-11 DIAGNOSIS — G62.9 POLYNEUROPATHY: Primary | ICD-10-CM

## 2018-07-11 RX ORDER — GABAPENTIN 300 MG/1
CAPSULE ORAL
Qty: 21 CAPSULE | Refills: 0 | Status: SHIPPED | OUTPATIENT
Start: 2018-07-11 | End: 2018-10-26 | Stop reason: SDUPTHER

## 2018-07-11 RX ORDER — GABAPENTIN 300 MG/1
CAPSULE ORAL
Qty: 270 CAPSULE | Refills: 3
Start: 2018-07-11 | End: 2018-07-20 | Stop reason: SDUPTHER

## 2018-07-11 NOTE — TELEPHONE ENCOUNTER
Pt requesting 7day supply gabapentin to local phaacy & regular rx to [facility]  Pt takes 300mg, 3tabs Qhs  If agreeable please sign off

## 2018-07-20 DIAGNOSIS — G62.9 POLYNEUROPATHY: ICD-10-CM

## 2018-07-20 RX ORDER — GABAPENTIN 300 MG/1
CAPSULE ORAL
Qty: 21 CAPSULE | Refills: 0 | Status: SHIPPED | OUTPATIENT
Start: 2018-07-20 | End: 2018-11-06 | Stop reason: SDUPTHER

## 2018-07-20 RX ORDER — GABAPENTIN 300 MG/1
CAPSULE ORAL
Qty: 270 CAPSULE | Refills: 3 | Status: SHIPPED | OUTPATIENT
Start: 2018-07-20 | End: 2019-01-21 | Stop reason: SDUPTHER

## 2018-07-20 NOTE — TELEPHONE ENCOUNTER
Script sent to [facility] not received as it was sent "no print" instead of normal  Pt will need another weeks worth sent to [facility] as well to hold her over   See other refill request  Thanks

## 2018-08-02 DIAGNOSIS — E03.9 HYPOTHYROIDISM, UNSPECIFIED TYPE: ICD-10-CM

## 2018-08-03 RX ORDER — LEVOTHYROXINE SODIUM 88 UG/1
TABLET ORAL
Qty: 90 TABLET | Refills: 3 | Status: SHIPPED | OUTPATIENT
Start: 2018-08-03 | End: 2019-09-19 | Stop reason: SDUPTHER

## 2018-10-26 DIAGNOSIS — G62.9 POLYNEUROPATHY: ICD-10-CM

## 2018-10-26 RX ORDER — GABAPENTIN 300 MG/1
CAPSULE ORAL
Qty: 10 CAPSULE | Refills: 0 | Status: SHIPPED | OUTPATIENT
Start: 2018-10-26 | End: 2018-11-06 | Stop reason: SDUPTHER

## 2018-10-26 NOTE — TELEPHONE ENCOUNTER
Patient will run out of gabapentin on Sunday  She is requesting a 10 day supply sent to local pharm, as her mail order will not get to her in time

## 2018-11-06 ENCOUNTER — OFFICE VISIT (OUTPATIENT)
Dept: FAMILY MEDICINE CLINIC | Facility: CLINIC | Age: 78
End: 2018-11-06
Payer: MEDICARE

## 2018-11-06 VITALS
HEART RATE: 60 BPM | BODY MASS INDEX: 22.73 KG/M2 | RESPIRATION RATE: 16 BRPM | HEIGHT: 65 IN | SYSTOLIC BLOOD PRESSURE: 160 MMHG | WEIGHT: 136.4 LBS | TEMPERATURE: 97.7 F | DIASTOLIC BLOOD PRESSURE: 78 MMHG

## 2018-11-06 DIAGNOSIS — G62.9 NEUROPATHY: ICD-10-CM

## 2018-11-06 DIAGNOSIS — Z23 NEED FOR INFLUENZA VACCINATION: ICD-10-CM

## 2018-11-06 DIAGNOSIS — E03.9 HYPOTHYROIDISM, UNSPECIFIED TYPE: Primary | ICD-10-CM

## 2018-11-06 DIAGNOSIS — R73.01 IMPAIRED FASTING GLUCOSE: ICD-10-CM

## 2018-11-06 DIAGNOSIS — I10 ESSENTIAL HYPERTENSION: Chronic | ICD-10-CM

## 2018-11-06 DIAGNOSIS — M79.7 FIBROMYALGIA: ICD-10-CM

## 2018-11-06 PROCEDURE — 90662 IIV NO PRSV INCREASED AG IM: CPT

## 2018-11-06 PROCEDURE — G0008 ADMIN INFLUENZA VIRUS VAC: HCPCS

## 2018-11-06 PROCEDURE — 99214 OFFICE O/P EST MOD 30 MIN: CPT | Performed by: FAMILY MEDICINE

## 2018-11-06 NOTE — PROGRESS NOTES
Chief Complaint   Patient presents with    Follow-up     6 month follow up  Health Maintenance   Topic Date Due    DTaP,Tdap,and Td Vaccines (1 - Tdap) 03/23/1959    Urinary Incontinence Screening  03/23/2003    INFLUENZA VACCINE  07/01/2018    HEMOGLOBIN A1C  04/25/2019    Fall Risk  05/01/2019    Medicare Annual Wellness Visit (AWV)  05/01/2019    Pneumococcal PPSV23/PCV13 65+ Years / Low and Medium Risk  Completed     Assessment/Plan:  Hypothyroidism---check labs ASAP  Continue levothyroxine  HTN---controlled at home  Continue lisinopril  FU cardiology  IFG---low carb diet  Neuropathy---continue gabapentin  Fibromyalgia---continue cymbalta  FU rheumatology  Give flu shot today  Got Prevnar 10/2015  Got Pneumovax 2017    3/2014 Dexa normal  Got script already  Fall precautions  Pt refused PT   RTO in 6 months with labs  Diagnoses and all orders for this visit:    Hypothyroidism, unspecified type  -     TSH, 3rd generation with Free T4 reflex; Future    Essential hypertension  -     Comprehensive metabolic panel; Future    Impaired fasting glucose  -     Hemoglobin A1C; Future    Neuropathy    Fibromyalgia    Need for influenza vaccination  -     influenza vaccine, 2999-6520, high-dose, PF 0 5 mL, for patients 65 yr+ (FLUZONE HIGH-DOSE)          Subjective:      Patient ID: Yi Faust is a [de-identified] y o  female  HPI    Pt is here by herself  HTN---She is on lisinopril 5mg daily now  BP at home 140/70 per pt  Today BP elevated in office  Pt states her BP always elevated in office  Pt denies headache, vision change, SOB or CP  FU cardiology Dr Ibrahim Room for Afib s/p ablation 2006 yearly  Denies chest pain, palpitation etc  She is on ASA 81mg daily       Hyperlipidemia---she is on zetia 10mg daily  IFG---4/2018 hgA1C 5 3 normal    Hypothyroidism---On levothyroxine 88 mcg daily  Feels OK     VitD deficiency---She is on vitD 2000IU daily       Fibromyalgia/lyme disease----Feels tired always  Always shoulders pain  FU rheumatology Dr Harkins Mealing for fibromyalgia/lyme disease  On cymbalta 30mg am and 60mg pm       FU neurology for headache/neuropathy  On gabapentin 900mg qhs which helped little  She is on topamax 50mg daily which helped  FU orthopedics for lumbar radiculopathy and lumbar spinal stenosis s/p L2-S2 laminectomy  Has balance problems  She uses cane/walker  Falls sometimes per pt       FU opthalmology yearly  Has hearing aid now       Live with , 3 dogs and 3 cats  Does all ADL's  [employer] [place] recently but no injuries  Pt refused physical therapy  Denies depression              The following portions of the patient's history were reviewed and updated as appropriate: allergies, current medications, past family history, past medical history, past social history, past surgical history and problem list     Review of Systems   Constitutional: Negative for appetite change, chills and fever  HENT: Negative for congestion, ear pain, sinus pain and sore throat  Eyes: Negative for discharge and itching  Respiratory: Negative for apnea, cough, chest tightness, shortness of breath and wheezing  Cardiovascular: Negative for chest pain, palpitations and leg swelling  Gastrointestinal: Negative for abdominal pain, anal bleeding, constipation, diarrhea, nausea and vomiting  Endocrine: Negative for cold intolerance, heat intolerance and polyuria  Genitourinary: Negative for difficulty urinating and dysuria  Musculoskeletal: Negative for arthralgias, back pain and myalgias  Skin: Negative for rash  Neurological: Negative for dizziness and headaches  Psychiatric/Behavioral: Negative for agitation           Objective:      /78 (BP Location: Left arm, Patient Position: Sitting, Cuff Size: Standard)   Pulse 60   Temp 97 7 °F (36 5 °C) (Oral)   Resp 16   Ht 5' 4 5" (1 638 m)   Wt 61 9 kg (136 lb 6 4 oz)   BMI 23 05 kg/m²          Physical Exam   Constitutional: She appears well-developed  No distress  HENT:   Head: Normocephalic  Right Ear: External ear normal    Left Ear: External ear normal    Nose: Nose normal    Mouth/Throat: Oropharynx is clear and moist    Eyes: Pupils are equal, round, and reactive to light  Conjunctivae are normal  Right eye exhibits no discharge  Left eye exhibits no discharge  Neck: Normal range of motion  No thyromegaly present  Cardiovascular: Normal rate, regular rhythm and normal heart sounds  Exam reveals no gallop and no friction rub  No murmur heard  Pulmonary/Chest: Effort normal and breath sounds normal  No respiratory distress  She has no wheezes  She has no rales  She exhibits no tenderness  Abdominal: Soft  Bowel sounds are normal    Musculoskeletal:   Use cane   Lymphadenopathy:     She has no cervical adenopathy  Neurological: She is alert  Psychiatric: She has a normal mood and affect

## 2018-12-19 ENCOUNTER — APPOINTMENT (EMERGENCY)
Dept: NON INVASIVE DIAGNOSTICS | Facility: HOSPITAL | Age: 78
End: 2018-12-19
Payer: MEDICARE

## 2018-12-19 ENCOUNTER — HOSPITAL ENCOUNTER (EMERGENCY)
Facility: HOSPITAL | Age: 78
Discharge: HOME | End: 2018-12-19
Attending: EMERGENCY MEDICINE | Admitting: EMERGENCY MEDICINE
Payer: MEDICARE

## 2018-12-19 ENCOUNTER — APPOINTMENT (EMERGENCY)
Dept: RADIOLOGY | Facility: HOSPITAL | Age: 78
End: 2018-12-19
Payer: MEDICARE

## 2018-12-19 VITALS
WEIGHT: 134.4 LBS | SYSTOLIC BLOOD PRESSURE: 172 MMHG | HEIGHT: 66 IN | OXYGEN SATURATION: 98 % | TEMPERATURE: 97.6 F | HEART RATE: 56 BPM | RESPIRATION RATE: 18 BRPM | BODY MASS INDEX: 21.6 KG/M2 | DIASTOLIC BLOOD PRESSURE: 71 MMHG

## 2018-12-19 DIAGNOSIS — M25.472 LEFT ANKLE SWELLING: ICD-10-CM

## 2018-12-19 DIAGNOSIS — M25.572 LEFT ANKLE PAIN: Primary | ICD-10-CM

## 2018-12-19 PROCEDURE — 73610 X-RAY EXAM OF ANKLE: CPT

## 2018-12-19 PROCEDURE — 93971 EXTREMITY STUDY: CPT

## 2018-12-19 PROCEDURE — 99284 EMERGENCY DEPT VISIT MOD MDM: CPT

## 2018-12-20 PROCEDURE — 93971 EXTREMITY STUDY: CPT | Performed by: SURGERY

## 2018-12-20 NOTE — DISCHARGE INSTRUCTIONS
Swollen Ankle Joint   WHAT YOU NEED TO KNOW:   A swollen ankle joint may be caused by conditions such as arthritis or gout, or by an injury  You may have other symptoms such as pain and trouble moving or putting weight on your ankle  DISCHARGE INSTRUCTIONS:   Return to the emergency department if:   · You cannot move your ankle at all  · You have severe pain that does not get better with medicine  Contact your healthcare provider if:   · You have a fever  · You have redness or warmth over your ankle  · The swelling does not decrease with treatment  · You have questions or concerns about your condition or care  Medicines:  · NSAIDs , such as ibuprofen, help decrease swelling, pain, and fever  This medicine is available with or without a doctor's order  NSAIDs can cause stomach bleeding or kidney problems in certain people  If you take blood thinner medicine, always ask your healthcare provider if NSAIDs are safe for you  Always read the medicine label and follow directions  · Take your medicine as directed  Contact your healthcare provider if you think your medicine is not helping or if you have side effects  Tell him of her if you are allergic to any medicine  Keep a list of the medicines, vitamins, and herbs you take  Include the amounts, and when and why you take them  Bring the list or the pill bottles to follow-up visits  Carry your medicine list with you in case of an emergency  Self-care:  Treatment depends on the cause of your swollen ankle joint  Your healthcare provider may recommend any of the following:  · Rest  your ankle  Avoid activities that make the swelling or pain worse  You may need to avoid putting weight on your ankle while you have pain  Crutches or a walker can be used to avoid putting weight on your ankle  · Apply ice  on your ankle for 15 to 20 minutes every hour or as directed  Use an ice pack, or put crushed ice in a plastic bag  Cover it with a towel   Ice helps prevent tissue damage and decreases swelling and pain  · Compress your ankle with a brace or bandage to help reduce swelling  Use a brace or bandage only as directed  · Elevate  your ankle above the level of your heart as often as you can  This will help decrease swelling and pain  Prop your joint on pillows or blankets to keep it elevated comfortably  · Apply heat  on your ankle for 20 to 30 minutes every 2 hours for as many days as directed  Heat helps decrease pain  Physical therapy:  A physical therapist teaches you exercises to help improve movement and strength, and to decrease pain  Follow up with your healthcare provider as directed:  Write down your questions so you remember to ask them during your visits  © 2017 [address] Information is for End User's use only and may not be sold, redistributed or otherwise used for commercial purposes  All illustrations and images included in CareNotes® are the copyrighted property of [employer]  or [employer] Perdom[employer]  The above information is an  only  It is not intended as medical advice for individual conditions or treatments  Talk to your doctor, nurse or pharmacist before following any medical regimen to see if it is safe and effective for you

## 2018-12-20 NOTE — ED ATTENDING ATTESTATION
Phyllis Murillo MD, saw and evaluated the patient  All available labs and X-rays were ordered by me or the resident and have been reviewed by myself  I discussed the patient with the resident / non-physician and agree with the resident's / non-physician practitioner's findings and plan as documented in the resident's / non-physician practicitioner's note, except where noted  At this point, I agree with the current assessment done in the ED  Chief Complaint   Patient presents with    Foot Pain     Per pt  report, "I have a lump on my left foot for a few days now  It's getting worse now  The pain is getting into my big toe  I'm afraid I may have a clot "  Redness and swelling noted to posterior aspect of left foot  +pedal pulses distally  This is an [de-identified]year old F presenting for 2 days of LEFT ankle / foot pain/swelling  No falls, trauma  She has been having issues with it since being diagnosed with Lyme's years ago  Today it is much worse pain  Denies trauma, fevers, nightsweats  Denies n/v/cp/sob  Hx of blood clot in the same leg and this feels worse  Able to walk on it but it is painful  Denies twisting her ankle  Denies any urinary tract infection symptoms (burning, itching, pain, blood, frequency)  Denies any upper respiratory tract infection symptoms (cough, congestion, rhinorrhea, sore throat)  Tried no medications  No hx of gout  No dietary changes  She thinks it is related to Lyme's disease somehow     PMH:  - HTN  - Fibromyalgia  - Migraine  - Hyper-cholesterolemia  - DVT  - pAF  - Lyme's  - CTS  - Hypothyroidism  PSH:  - Appendectomy  - Lila  - Breast surgery  - IVF filter in place  - Atrial ablation  - Lymph node biopsy  - BRITTANI  No smoking drinking drugs  PE:  Vitals:    12/19/18 1943 12/19/18 2158 12/19/18 2302   BP: (!) 171/76 (!) 183/78 (!) 172/71   BP Location: Left arm Right arm Right arm   Pulse: 61 56 56   Resp: 19 18 18   Temp: 97 6 °F (36 4 °C)     TempSrc: Oral SpO2: 98% 96% 98%   Weight: 61 kg (134 lb 6 4 oz)     Height: 5' 6" (1 676 m)     General: VSS, NAD, awake, alert  Well-nourished, well-developed  Appears stated age  Speaking normally in full sentences  Head: Normocephalic, atraumatic, nontender  Eyes: PERRL, EOM-I  No diplopia  No hyphema  No subconjunctival hemorrhages  Symmetrical lids  ENT: Atraumatic external nose and ears  MMM  No malocclusion  No stridor  Normal phonation  No drooling  Normal swallowing  Neck: Symmetric, trachea midline  No JVD  CV: RRR  +S1/S2  No murmurs or gallops  Peripheral pulses +2 throughout  No chest wall tenderness  Lungs:   Unlabored No retractions  CTAB, lungs sounds equal bilateral    No tachypnea  Abd: +BS, soft, NT/ND    MSK:   FROM   Minimal swelling compared to opposite side  BOTH sides are very hyper-esthetic when I'm lightly touching it but says it is more sensitive on the left  No redness  No warmth  No cellulitis  Able to spontaneously move his toes and foot/ankle but when I forcibly do it, she has severe pain  No palpable cord  Back:   No rashes  Skin: Dry, intact  Neuro: AAOx3, GCS 15, CN II-XII grossly intact  Motor grossly intact  Psychiatric/Behavioral: Appropriate mood and affect   Exam: deferred  A:  - Foot/ankle pain/swelling  P:  - r/o VTE  - r/o sprain/fx  - 13 point ROS was performed and all are normal unless stated in the history above  - Nursing note reviewed  Vitals reviewed  - Orders placed by myself and/or advanced practitioner / resident     - Previous chart was reviewed  - No language barrier    - History obtained from patient  - There are no limitations to the history obtained  - Critical care time: Not applicable for this patient  Final Diagnosis:  1  Left ankle pain    2   Left ankle swelling           Medications - No data to display  VAS lower limb venous duplex study, unilateral/limited   ED Interpretation   Chronic 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 (known by patient)  XR ankle 3+ views LEFT   ED Interpretation   The ankle was ordered by me and interpreted by me independently  On my read, it appears:   - osteopenia   - no fracture        Orders Placed This Encounter   Procedures    XR ankle 3+ views LEFT     Labs Reviewed - No data to display  Time reflects when diagnosis was documented in both MDM as applicable and the Disposition within this note     Time User Action Codes Description Comment    12/19/2018 10:43 PM Shaan Pan Add [M25 572] Left ankle pain     12/19/2018 10:43 PM Shaan Pan Add [M25 472] Left ankle swelling       ED Disposition     ED Disposition Condition Comment    Discharge  Frances Peña discharge to home/self care  Condition at discharge: Stable        Follow-up Information     Follow up With Specialties Details Why Contact Info Additional Information    Trip Hickman MD Family Medicine Schedule an appointment as soon as possible for a visit within next 2 days for follow up [place] [phone]       [address] [place] [address] Emergency Department Emergency Medicine Go to If symptoms worsen [address] [place] [address] [address] [place] [address] ED, [address] [place] [address], [facility]        Patient's Medications   Discharge Prescriptions    No medications on file     No discharge procedures on file  Prior to Admission Medications   Prescriptions Last Dose Informant Patient Reported? Taking? Cholecalciferol (VITAMIN D3) 2000 UNITS capsule  Self Yes No   Sig: Take 2,000 Units by mouth daily  DULoxetine (CYMBALTA) 30 mg delayed release capsule  Self No No   Sig: Take 3 capsules (90 mg dose) daily  Patient taking differently: 30 mg daily Take 3 capsules (90 mg dose) daily  DULoxetine (CYMBALTA) 60 mg delayed release capsule  Self Yes No   Sig: Take 60 mg by mouth daily   Menthol 5 4 MG LOZG  Self No No   Sig: Take 1 lozenge every 2 hours as needed     Multiple Vitamins-Minerals (OCUVITE EXTRA PO)  Self Yes No   Sig: Take by mouth   acetaminophen (TYLENOL) 325 mg tablet  Self No No   Sig: Take 1 to 2 tablets as needed for pain  Patient not taking: Reported on 11/6/2018    aspirin (ECOTRIN LOW STRENGTH) 81 mg EC tablet  Self Yes No   Sig: Take 81 mg by mouth daily   ezetimibe (ZETIA) 10 mg tablet  Self Yes No   Sig: Take 10 mg by mouth daily  gabapentin (NEURONTIN) 300 mg capsule  Self No No   Sig: Take 3 capsules at bedtime   levothyroxine 88 mcg tablet  Self No No   Sig: TAKE 1 TABLET BY MOUTH  EVERY DAY   lisinopril (ZESTRIL) 10 mg tablet  Self Yes No   Sig: Take 10 mg by mouth daily   topiramate (TOPAMAX) 50 MG tablet  Self No No   Sig: Take 1 tablet (50 mg total) by mouth daily      Facility-Administered Medications: None       Portions of the record may have been created with voice recognition software  Occasional wrong word or "sound a like" substitutions may have occurred due to the inherent limitations of voice recognition software  Read the chart carefully and recognize, using context, where substitutions have occurred      Electronically signed by:  Tod Ingram

## 2018-12-20 NOTE — ED PROVIDER NOTES
History  Chief Complaint   Patient presents with    Foot Pain     Per pt  report, "I have a lump on my left foot for a few days now  It's getting worse now  The pain is getting into my big toe  I'm afraid I may have a clot "  Redness and swelling noted to posterior aspect of left foot  +pedal pulses distally  This is an 78-year-old female with a history of hypertension, DVT, hyperlipidemia, fibromyalgia, who presents with left ankle pain and swelling  Patient states that 2 days ago, she began to experience left ankle pain and swelling  Denies any traumatic event  States the pain has been getting worse  She decided to come to the emergency department because the ankle started come painful when bearing weight  States that she does have a history of DVT but this feels more painful  Patient states that she was diagnosed with Lyme disease a few years ago and has been experiencing problems with peripheral neuropathy in this leg since her diagnosis  Patient also complains of decreased range of motion due to tightness  Patient states that she is worried about a blood clot  Denies fever/chills, nausea/vomiting, lightheadedness/dizziness, numbness/weakness, headache, change in vision, URI symptoms, neck pain, chest pain, palpitations, shortness of breath, cough, back pain, flank pain, abdominal pain, diarrhea, hematochezia, melena, dysuria, hematuria, abnormal vaginal discharge/bleeding  Prior to Admission Medications   Prescriptions Last Dose Informant Patient Reported? Taking? Cholecalciferol (VITAMIN D3) 2000 UNITS capsule  Self Yes No   Sig: Take 2,000 Units by mouth daily  DULoxetine (CYMBALTA) 30 mg delayed release capsule  Self No No   Sig: Take 3 capsules (90 mg dose) daily  Patient taking differently: 30 mg daily Take 3 capsules (90 mg dose) daily      DULoxetine (CYMBALTA) 60 mg delayed release capsule  Self Yes No   Sig: Take 60 mg by mouth daily   Menthol 5 4 MG LOZG  Self No No   Sig: Take 1 lozenge every 2 hours as needed  Multiple Vitamins-Minerals (OCUVITE EXTRA PO)  Self Yes No   Sig: Take by mouth   acetaminophen (TYLENOL) 325 mg tablet  Self No No   Sig: Take 1 to 2 tablets as needed for pain  Patient not taking: Reported on 11/6/2018    aspirin (ECOTRIN LOW STRENGTH) 81 mg EC tablet  Self Yes No   Sig: Take 81 mg by mouth daily   ezetimibe (ZETIA) 10 mg tablet  Self Yes No   Sig: Take 10 mg by mouth daily     gabapentin (NEURONTIN) 300 mg capsule  Self No No   Sig: Take 3 capsules at bedtime   levothyroxine 88 mcg tablet  Self No No   Sig: TAKE 1 TABLET BY MOUTH  EVERY DAY   lisinopril (ZESTRIL) 10 mg tablet  Self Yes No   Sig: Take 10 mg by mouth daily   topiramate (TOPAMAX) 50 MG tablet  Self No No   Sig: Take 1 tablet (50 mg total) by mouth daily      Facility-Administered Medications: None       Past Medical History:   Diagnosis Date    CTS (carpal tunnel syndrome)     unspecified laterality    D[facility] thrombosis) ([facility])     left leg, s/p IVC filter 11/2015    Endometriosis     Fibromyalgia     Hypercholesteremia     Hypertension     Hypothyroidism     Lyme disease     treated 8/2015    Migraine     PAF (paroxysmal atrial fibrillation) ([facility])     s/p [facility] (sees [facility]z [facility])   Minerva Cantu Peripheral neuropathy     Solitary pulmonary nodule on lung CT        Past Surgical History:   Procedure Laterality Date    ABDOMINAL HYSTERECTOMY      APPENDECTOMY      ATRIAL ABLATION SURGERY      cath    BREAST SURGERY Left     puncture aspiration of cyst onset 1972    CHOLECYSTECTOMY      onset 1981    COLONOSCOPY      fiberoptic    FOOT SURGERY Bilateral     onset 1993- removal of mortons neuroma    HAND SURGERY      gaglion cyst removal    HAND SURGERY      onset 1991 - carpal tunnel    IVC FILTER INSERTION      KNEE ARTHROSCOPY Left     therapeutic     LYMPH NODE BIOPSY      1996 onset    IL ARTHRODESIS POSTERIOR/POSTEROLATERAL LUMBAR N/A 3/29/2016    Procedure: L2-S1 LAMINECTOMY;  Surgeon: Kiara Kinney DO;  [facility]: [facility];  Service: Orthopedics    TOTAL ABDOMINAL HYSTERECTOMY      onset 1989       Family History   Problem Relation Age of Onset    Heart disease Father     ADD / ADHD Father     Stroke Father     Cancer Brother         prostate    Heart attack Maternal Grandmother         acute MI    Cancer Family      I have reviewed and agree with the history as documented  Social History   Substance Use Topics    Smoking status: Never Smoker    Smokeless tobacco: Never Used    Alcohol use No        Review of Systems   Constitutional: Negative for chills and fever  HENT: Negative for rhinorrhea, sore throat and trouble swallowing  Eyes: Negative for photophobia and visual disturbance  Respiratory: Negative for cough, chest tightness and shortness of breath  Cardiovascular: Negative for chest pain, palpitations and leg swelling  Gastrointestinal: Negative for abdominal pain, blood in stool, diarrhea, nausea and vomiting  Endocrine: Negative for polyuria  Genitourinary: Negative for dysuria, flank pain, hematuria, vaginal bleeding and vaginal discharge  Musculoskeletal: Negative for back pain and neck pain  Skin: Negative for color change and rash  Allergic/Immunologic: Negative for immunocompromised state  Neurological: Negative for dizziness, weakness, light-headedness, numbness and headaches  All other systems reviewed and are negative        Physical Exam  ED Triage Vitals [12/19/18 1943]   Temperature Pulse Respirations Blood Pressure SpO2   97 6 °F (36 4 °C) 61 19 (!) 171/76 98 %      Temp Source Heart Rate Source Patient Position - Orthostatic VS BP Location FiO2 (%)   Oral Monitor Sitting Left arm --      Pain Score       8           Orthostatic Vital Signs  Vitals:    12/19/18 1943 12/19/18 2158   BP: (!) 171/76 (!) 183/78   Pulse: 61 56   Patient Position - Orthostatic VS: Sitting Sitting Physical Exam   Constitutional: Vital signs are normal  She appears well-developed  She is cooperative  No distress  HENT:   Mouth/Throat: Uvula is midline, oropharynx is clear and moist and mucous membranes are normal    Eyes: Pupils are equal, round, and reactive to light  Conjunctivae and EOM are normal    Neck: Trachea normal  No thyroid mass and no thyromegaly present  Cardiovascular: Normal rate, regular rhythm, normal heart sounds, intact distal pulses and normal pulses  No murmur heard  Pulmonary/Chest: Effort normal and breath sounds normal    Abdominal: Soft  Normal appearance and bowel sounds are normal  There is no tenderness  There is no rebound, no guarding and no CVA tenderness  Musculoskeletal:   Erythema and swelling noted to left medial malleolus  Area is tender to the touch  No warmth noted  DP and PT pulses are 2+  Foot is pink, warm, perfusing well  Normal range of motion without pain  Negative Homans sign  Left calf is nontender  No swelling noted  Neurological: She is alert  Skin: Skin is warm, dry and intact  Psychiatric: She has a normal mood and affect  Her speech is normal and behavior is normal  Thought content normal        ED Medications  Medications - No data to display    Diagnostic Studies  Results Reviewed     None                 VAS lower limb venous duplex study, unilateral/limited   ED Interpretation by Tod Ingram MD (12/19 2252)   Chronic 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 (known by patient)  XR ankle 3+ views LEFT   ED Interpretation by Tod Ingram MD (12/19 2248)   The ankle was ordered by me and interpreted by me independently  On my read, it appears:   - osteopenia   - no fracture            Procedures  Procedures      Phone Consults  ED Phone Contact    ED Course  ED Course as of Dec 19 2258   Wed Dec 19, 2018   2219 No acute DVT per the vascular tech  He did see a chronic DVT in the left popliteal artery  Identification of Seniors at Risk      Most Recent Value   (ISAR) Identification of Seniors at Risk   Before the illness or injury that brought you to the Emergency, did you need someone to help you on a regular basis? 0 Filed at: 12/19/2018 1950   In the last 24 hours, have you needed more help than usual?  0 Filed at: 12/19/2018 1950   Have you been hospitalized for one or more nights during the past 6 months? 0 Filed at: 12/19/2018 1950   In general, do you see well? 1 Filed at: 12/19/2018 1950   In general, do you have serious problems with your memory? 0 Filed at: 12/19/2018 1950   Do you take more than three different medications every day? 1 Filed at: 12/19/2018 1950   ISAR Score  2 Filed at: 12/19/2018 1950                          [facility]  Number of Diagnoses or Management Options  Diagnosis management comments: X-ray left ankle with left lower extremity duplex  Disposition pending results  CritCare Time    Disposition  Final diagnoses:   Left ankle pain   Left ankle swelling     Time reflects when diagnosis was documented in both MDM as applicable and the Disposition within this note     Time User Action Codes Description Comment    12/19/2018 10:43 PM Brooksie Ruiz Add [M25 572] Left ankle pain     12/19/2018 10:43 PM Brooksie Ruiz Add [M25 472] Left ankle swelling       ED Disposition     ED Disposition Condition Comment    Discharge  Javad Mckenna discharge to home/self care      Condition at discharge: Stable        Follow-up Information     Follow up With Specialties Details Why Contact Info Additional Information    Lisa Larose MD Family Medicine Schedule an appointment as soon as possible for a visit within next 2 days for follow up [place] [phone]       [address] [place] [address] Emergency Department Emergency Medicine Go to If symptoms worsen [address]  [phone] [facility], [address] [facility], [place], [address]          Patient's Medications   Discharge Prescriptions    No medications on file     No discharge procedures on file  ED Provider  Attending physically available and evaluated Yi Lowe I managed the patient along with the ED Attending      Electronically Signed by         Tee Ovalle MD  12/19/18 5309

## 2019-01-04 LAB
25(OH)D3 SERPL-MCNC: 28 NG/ML (ref 30–100)
ALBUMIN SERPL-MCNC: 4 G/DL (ref 3.6–5.1)
ALBUMIN/GLOB SERPL: 2 (CALC) (ref 1–2.5)
ALP SERPL-CCNC: 73 U/L (ref 33–130)
ALT SERPL-CCNC: 8 U/L (ref 6–29)
AST SERPL-CCNC: 17 U/L (ref 10–35)
BASOPHILS # BLD AUTO: 41 CELLS/UL (ref 0–200)
BASOPHILS NFR BLD AUTO: 0.8 %
BILIRUB SERPL-MCNC: 0.4 MG/DL (ref 0.2–1.2)
BUN SERPL-MCNC: 25 MG/DL (ref 7–25)
BUN/CREAT SERPL: ABNORMAL (CALC) (ref 6–22)
CALCIUM SERPL-MCNC: 9.3 MG/DL (ref 8.6–10.4)
CHLORIDE SERPL-SCNC: 109 MMOL/L (ref 98–110)
CHOLEST SERPL-MCNC: 211 MG/DL
CHOLEST/HDLC SERPL: 2.5 (CALC)
CO2 SERPL-SCNC: 28 MMOL/L (ref 20–32)
CREAT SERPL-MCNC: 0.74 MG/DL (ref 0.6–0.88)
EOSINOPHIL # BLD AUTO: 250 CELLS/UL (ref 15–500)
EOSINOPHIL NFR BLD AUTO: 4.9 %
ERYTHROCYTE [DISTWIDTH] IN BLOOD BY AUTOMATED COUNT: 12.4 % (ref 11–15)
EST. AVERAGE GLUCOSE BLD GHB EST-MCNC: 103 (CALC)
EST. AVERAGE GLUCOSE BLD GHB EST-SCNC: 5.7 (CALC)
GLOBULIN SER CALC-MCNC: 2 G/DL (CALC) (ref 1.9–3.7)
GLUCOSE SERPL-MCNC: 84 MG/DL (ref 65–99)
HBA1C MFR BLD: 5.2 % OF TOTAL HGB
HCT VFR BLD AUTO: 39.2 % (ref 35–45)
HDLC SERPL-MCNC: 83 MG/DL
HGB BLD-MCNC: 12.9 G/DL (ref 11.7–15.5)
LDLC SERPL CALC-MCNC: 113 MG/DL (CALC)
LYMPHOCYTES # BLD AUTO: 791 CELLS/UL (ref 850–3900)
LYMPHOCYTES NFR BLD AUTO: 15.5 %
MCH RBC QN AUTO: 31.1 PG (ref 27–33)
MCHC RBC AUTO-ENTMCNC: 32.9 G/DL (ref 32–36)
MCV RBC AUTO: 94.5 FL (ref 80–100)
MONOCYTES # BLD AUTO: 403 CELLS/UL (ref 200–950)
MONOCYTES NFR BLD AUTO: 7.9 %
NEUTROPHILS # BLD AUTO: 3616 CELLS/UL (ref 1500–7800)
NEUTROPHILS NFR BLD AUTO: 70.9 %
NONHDLC SERPL-MCNC: 128 MG/DL (CALC)
PLATELET # BLD AUTO: 218 THOUSAND/UL (ref 140–400)
PMV BLD REES-ECKER: 10.6 FL (ref 7.5–12.5)
POTASSIUM SERPL-SCNC: 3.8 MMOL/L (ref 3.5–5.3)
PROT SERPL-MCNC: 6 G/DL (ref 6.1–8.1)
RBC # BLD AUTO: 4.15 MILLION/UL (ref 3.8–5.1)
SL AMB EGFR AFRICAN AMERICAN: 89 ML/MIN/1.73M2
SL AMB EGFR NON AFRICAN AMERICAN: 77 ML/MIN/1.73M2
SODIUM SERPL-SCNC: 142 MMOL/L (ref 135–146)
TRIGL SERPL-MCNC: 68 MG/DL
TSH SERPL-ACNC: 4.42 MIU/L (ref 0.4–4.5)
WBC # BLD AUTO: 5.1 THOUSAND/UL (ref 3.8–10.8)

## 2019-01-21 DIAGNOSIS — G43.709 CHRONIC MIGRAINE WITHOUT AURA WITHOUT STATUS MIGRAINOSUS, NOT INTRACTABLE: ICD-10-CM

## 2019-01-21 DIAGNOSIS — G62.9 POLYNEUROPATHY: ICD-10-CM

## 2019-01-21 RX ORDER — TOPIRAMATE 50 MG/1
50 TABLET, FILM COATED ORAL DAILY
Qty: 90 TABLET | Refills: 0 | Status: SHIPPED | OUTPATIENT
Start: 2019-01-21 | End: 2019-02-25 | Stop reason: SDUPTHER

## 2019-01-21 RX ORDER — GABAPENTIN 300 MG/1
CAPSULE ORAL
Qty: 270 CAPSULE | Refills: 0 | Status: SHIPPED | OUTPATIENT
Start: 2019-01-21 | End: 2019-02-25 | Stop reason: SDUPTHER

## 2019-01-21 NOTE — TELEPHONE ENCOUNTER
I have never seen this pt in past  She cxl in October   She is scheduled for a spnl appointment with me in feb   Will fill only till appointment

## 2019-01-22 NOTE — TELEPHONE ENCOUNTER
Pt aware  It does look like Dr Wandy Shahid has seen pt in the past 10/9/17 & 3/30/17  Pt will be @next appt

## 2019-02-25 ENCOUNTER — OFFICE VISIT (OUTPATIENT)
Dept: NEUROLOGY | Facility: CLINIC | Age: 79
End: 2019-02-25
Payer: MEDICARE

## 2019-02-25 VITALS
WEIGHT: 135.6 LBS | DIASTOLIC BLOOD PRESSURE: 68 MMHG | RESPIRATION RATE: 12 BRPM | HEART RATE: 77 BPM | BODY MASS INDEX: 21.79 KG/M2 | HEIGHT: 66 IN | SYSTOLIC BLOOD PRESSURE: 132 MMHG

## 2019-02-25 DIAGNOSIS — G62.9 NEUROPATHY: Primary | ICD-10-CM

## 2019-02-25 DIAGNOSIS — G44.89 OTHER HEADACHE SYNDROME: ICD-10-CM

## 2019-02-25 DIAGNOSIS — M54.16 LUMBAR RADICULOPATHY: ICD-10-CM

## 2019-02-25 DIAGNOSIS — G62.9 POLYNEUROPATHY: ICD-10-CM

## 2019-02-25 DIAGNOSIS — G60.9 HEREDITARY AND IDIOPATHIC NEUROPATHY: ICD-10-CM

## 2019-02-25 DIAGNOSIS — G43.709 CHRONIC MIGRAINE WITHOUT AURA WITHOUT STATUS MIGRAINOSUS, NOT INTRACTABLE: ICD-10-CM

## 2019-02-25 PROCEDURE — 99214 OFFICE O/P EST MOD 30 MIN: CPT | Performed by: PSYCHIATRY & NEUROLOGY

## 2019-02-25 RX ORDER — GABAPENTIN 300 MG/1
CAPSULE ORAL
Qty: 270 CAPSULE | Refills: 1 | Status: SHIPPED | OUTPATIENT
Start: 2019-02-25 | End: 2019-08-09 | Stop reason: SDUPTHER

## 2019-02-25 RX ORDER — TOPIRAMATE 50 MG/1
50 TABLET, FILM COATED ORAL DAILY
Qty: 90 TABLET | Refills: 1 | Status: SHIPPED | OUTPATIENT
Start: 2019-02-25 | End: 2019-08-19 | Stop reason: SDUPTHER

## 2019-02-25 NOTE — ASSESSMENT & PLAN NOTE
She has a known polyneuropathy  In the past B12 folate were normal   However her last levels were performed years ago  Her exam is stable however I would like to repeat her B12 and folate at this time  She is currently on low dose of gabapentin with control of her dysesthesias

## 2019-02-25 NOTE — ASSESSMENT & PLAN NOTE
She has a history of migraine headaches which is well controlled on Topamax  A new prescription was sent to the pharmacy    Recent CMP was normal

## 2019-02-25 NOTE — PROGRESS NOTES
Patient ID: Ronda Morgan is a [de-identified] y o  female  Assessment/Plan:    Neuropathy  She has a known polyneuropathy  In the past B12 folate were normal   However her last levels were performed years ago  Her exam is stable however I would like to repeat her B12 and folate at this time  She is currently on low dose of gabapentin with control of her dysesthesias  Lumbar radiculopathy  She continues to have a left foot drop with weakness in various muscles in the left lower extremity  This is consistent with a radiculopathy her strength has slightly  improved since her last visit    I have encouraged her to be active  She is to avoid steps of possible utilize shoes with good support and utilize her rolling walker an outside situations  Headache  She has a history of migraine headaches which is well controlled on Topamax  A new prescription was sent to the pharmacy  Recent CMP was normal     She is to return to our offices in six months if she has increasing symptoms then a further workup such as an EMG or MRI may be required at that time  Diagnoses and all orders for this visit:    Neuropathy    Polyneuropathy  -     gabapentin (NEURONTIN) 300 mg capsule; Take 3 capsules at bedtime  -     Vitamin B12; Future  -     Folate; Future    Chronic migraine without aura without status migrainosus, not intractable  -     topiramate (TOPAMAX) 50 MG tablet; Take 1 tablet (50 mg total) by mouth daily    Lumbar radiculopathy    Hereditary and idiopathic neuropathy   -     Vitamin B12; Future  -     Folate; Future    Other headache syndrome         Subjective:    Sameera Thomas is a [de-identified]  yo woman with a history of multiple medical problems including fibromyalgia, HTN, hyperlipidemia, and lumbar spinal degenerative disease and mild peripheral neuropathy who is returning to Neurology clinic for follow up  She was last evaluated by myself in 2017   Neurologic examination did reveal worsened left leg weakness compared to prior examination, with decreased reflexes in the left leg compared to the right and mild decreased vibratory sense  She did undergo EMG of the lower extremities which showed a polyneuropathy and a left l4/l5 radiculopathy in June of 2015  She was originally evaluated by dr Goldstein and neurontin was added  On gabapentin 900 mg at bedtime with relief    she did undergo a l2/S1 laminectomy and decompression in march 2016 and has noted improvement in the left leg weakness   She is now able to lift up her left leg   The left leg symptoms are better after the surgery but still have not returned to normal  she can uses canes while home but uses a walker when outside  She attributes radiculopathy due to lyme diease     She continues to have dysesthesias but the q h s  Dose of gabapentin is quite helpful  She does have a left-sided foot drop  She recently stopped using a brace due to swelling of the left foot  She underwent Doppler study that was negative for a clot and now she utilizes sneakers with some support  She is quite careful when going up and down steps  She denies any measurable difference since being off of the Mafo  brace    She is on Cymbalta 90 mg a day which is prescribed by her rheumatologist     Overall her migraines are well controlled on Topamax 50 mg a day          the last visit , she started on a protocol of salt and C Plus for Lyme disease, on neuroquell 2 tablets in afternoon and 3 300mg she also utilized Chlorella 500mg 6 daily       Vit d 28 and on vit replacement    She complains of visual changes but informs me she does require new glasses                              The following portions of the patient's history were reviewed and updated as appropriate:   She  has a past medical history of CTS (carpal tunnel syndrome), DVT (deep venous thrombosis) ([facility] [address] ), Endometriosis, Fibromyalgia, Hypercholesteremia, Hypertension, Hypothyroidism, Lyme disease, Migraine, PAF (paroxysmal atrial fibrillation) ([facility] [address] ), Peripheral neuropathy, and Solitary pulmonary nodule on lung CT  She  has a past surgical history that includes Appendectomy; Breast surgery (Left); Cholecystectomy; Abdominal hysterectomy; IVC FILTER INSERTION; pr arthrodesis posterior/posterolateral lumbar (N/A, 3/29/2016); Lymph node biopsy; Atrial ablation surgery; Colonoscopy; Foot surgery (Bilateral); Hand surgery; Hand surgery; Knee arthroscopy (Left); and Total abdominal hysterectomy  Her family history includes ADD / ADHD in her father; Cancer in her brother and family; Heart attack in her maternal grandmother; Heart disease in her father; Stroke in her father  She  reports that she has never smoked  She has never used smokeless tobacco  She reports that she does not drink alcohol or use drugs  Current Outpatient Medications   Medication Sig Dispense Refill    aspirin (ECOTRIN LOW STRENGTH) 81 mg EC tablet Take 81 mg by mouth daily      Cholecalciferol (VITAMIN D3) 2000 UNITS capsule Take 2,000 Units by mouth daily   DULoxetine (CYMBALTA) 30 mg delayed release capsule Take 3 capsules (90 mg dose) daily  (Patient taking differently: 30 mg daily Take 3 capsules (90 mg dose) daily  ) 30 capsule 0    DULoxetine (CYMBALTA) 60 mg delayed release capsule Take 60 mg by mouth daily      ezetimibe (ZETIA) 10 mg tablet Take 10 mg by mouth daily   gabapentin (NEURONTIN) 300 mg capsule Take 3 capsules at bedtime 270 capsule 1    levothyroxine 88 mcg tablet TAKE 1 TABLET BY MOUTH  EVERY DAY 90 tablet 3    lisinopril (ZESTRIL) 10 mg tablet Take 10 mg by mouth daily      Menthol 5 4 MG LOZG Take 1 lozenge every 2 hours as needed  0    Multiple Vitamins-Minerals (OCUVITE EXTRA PO) Take by mouth      topiramate (TOPAMAX) 50 MG tablet Take 1 tablet (50 mg total) by mouth daily 90 tablet 1     No current facility-administered medications for this visit        She is allergic to betaine; cranberry extract; cranberry juice powder; isoflavones; latex; soybean-containing drug products; and voltaren [diclofenac sodium]            Objective:    Blood pressure 132/68, pulse 77, resp  rate 12, height 5' 6" (1 676 m), weight 61 5 kg (135 lb 9 6 oz)  Physical Exam   Eyes: Pupils are equal, round, and reactive to light  Lids are normal    Neurological:   Reflex Scores:       Tricep reflexes are 1+ on the right side and 1+ on the left side  Bicep reflexes are 2+ on the right side and 2+ on the left side  Patellar reflexes are Tr on the right side and Tr on the left side  Achilles reflexes are Tr on the right side and Tr on the left side  Neurological Exam    Cranial Nerves  CN II: Visual acuity is normal  Visual fields full to confrontation  CN III, IV, VI: Extraocular movements intact bilaterally  Normal lids and orbits bilaterally  Pupils equal round and reactive to light bilaterally  CN V: Facial sensation is normal   CN VII: Full and symmetric facial movement  CN VIII: Hearing is normal   CN IX, X: Palate elevates symmetrically  Normal gag reflex  CN XI: Shoulder shrug strength is normal   CN XII: Tongue midline without atrophy or fasciculations  Motor  Normal muscle bulk throughout  Strength is 5/5 in all four extremities except as noted  Left pf 3-/5, df 1/5 , rigt  Normal       Plantar flexion was a 3/5 on the left side  Foot Dorsiflexion: 1/5/5 on the left side  Ankle Inversion: 1/5/5 on the left side  Ankle Eversion: 1/5/5 on the left side  Knee Flexion: 4/5/5 on the left side  Knee Extension: 4/5/5 on the left side  Hip Flexion: 5/5 on the right side and 5-/5  on the left side  Muscle tone: Abnormal            Sensory  She has decreased sensation in the lateral aspect of the left leg  Vibratory sensation was decreased in the left toe and it lasted for approximately 10 seconds on the right toe  Proprioception was decreased      Reflexes                                           Right Left  Biceps                                 2+                         2+  Triceps                                1+                         1+  Patellar                                Tr                         Tr  Achilles                                Tr                         Tr  Plantar                           Downgoing                Downgoing    Coordination  Right: Finger-to-nose normal  Heel-to-shin normal   Left: Finger-to-nose normal  Heel-to-shin normal     Gait Unable to rise from chair without using arms  Utilizes a rolling walker  She did not have a Romberg sign  She was unable to tandem gait  Review of systems as below was reviewed personally at the patient's appointment    ROS:    Review of Systems   Constitutional: Negative  HENT: Positive for hearing loss  Ringing in the ear    Eyes: Positive for visual disturbance  Dry Eyes    Respiratory: Negative  Cardiovascular: Negative  Gastrointestinal: Negative  Endocrine:        Hair loss,    Genitourinary: Negative  Musculoskeletal: Positive for back pain and neck pain  Joint pain, Muscle pain, Immobility or loss of function,    Skin: Negative  Allergic/Immunologic: Negative  Neurological: Positive for numbness  Hematological: Bruises/bleeds easily  Psychiatric/Behavioral:        Increased sleepiness, Snoring, Memory problems, Balance difficulties, Difficulty walking, Tingling, Falls    All other systems reviewed and are negative

## 2019-02-25 NOTE — ASSESSMENT & PLAN NOTE
She continues to have a left foot drop with weakness in various muscles in the left lower extremity  This is consistent with a radiculopathy her strength has slightly  improved since her last visit    I have encouraged her to be active  She is to avoid steps of possible utilize shoes with good support and utilize her rolling walker an outside situations

## 2019-08-08 DIAGNOSIS — G62.9 POLYNEUROPATHY: ICD-10-CM

## 2019-08-08 RX ORDER — DULOXETIN HYDROCHLORIDE 30 MG/1
CAPSULE, DELAYED RELEASE ORAL
Qty: 30 CAPSULE | Refills: 0 | Status: CANCELLED
Start: 2019-08-08

## 2019-08-08 NOTE — TELEPHONE ENCOUNTER
Please find out if she is on this med and the dose     Who has been prescribing it ?  The last refill was in 2016 for 1 month supply

## 2019-08-09 DIAGNOSIS — G62.9 POLYNEUROPATHY: ICD-10-CM

## 2019-08-09 RX ORDER — GABAPENTIN 300 MG/1
CAPSULE ORAL
Qty: 30 CAPSULE | Refills: 0 | Status: SHIPPED | OUTPATIENT
Start: 2019-08-09 | End: 2020-03-10

## 2019-08-09 RX ORDER — GABAPENTIN 300 MG/1
CAPSULE ORAL
Qty: 270 CAPSULE | Refills: 1 | Status: CANCELLED | OUTPATIENT
Start: 2019-08-09

## 2019-08-09 RX ORDER — GABAPENTIN 300 MG/1
300 CAPSULE ORAL 3 TIMES DAILY
Qty: 270 CAPSULE | Refills: 1 | Status: SHIPPED | OUTPATIENT
Start: 2019-08-09 | End: 2019-08-12 | Stop reason: SDUPTHER

## 2019-08-09 NOTE — TELEPHONE ENCOUNTER
Spoke to the pt    She is out of pills     Will send a 10 day supply to [facility]     And a 3 month supply to [facility] ( will take 10 days  To arrive)

## 2019-08-12 ENCOUNTER — OFFICE VISIT (OUTPATIENT)
Dept: FAMILY MEDICINE CLINIC | Facility: CLINIC | Age: 79
End: 2019-08-12
Payer: MEDICARE

## 2019-08-12 VITALS
BODY MASS INDEX: 21.52 KG/M2 | OXYGEN SATURATION: 97 % | WEIGHT: 129.2 LBS | HEIGHT: 65 IN | HEART RATE: 80 BPM | DIASTOLIC BLOOD PRESSURE: 78 MMHG | SYSTOLIC BLOOD PRESSURE: 138 MMHG | TEMPERATURE: 97.2 F | RESPIRATION RATE: 13 BRPM

## 2019-08-12 DIAGNOSIS — R73.01 IMPAIRED FASTING GLUCOSE: ICD-10-CM

## 2019-08-12 DIAGNOSIS — E78.5 HYPERLIPIDEMIA, UNSPECIFIED HYPERLIPIDEMIA TYPE: ICD-10-CM

## 2019-08-12 DIAGNOSIS — E03.9 HYPOTHYROIDISM, UNSPECIFIED TYPE: Primary | ICD-10-CM

## 2019-08-12 DIAGNOSIS — I10 ESSENTIAL HYPERTENSION: Chronic | ICD-10-CM

## 2019-08-12 DIAGNOSIS — Z00.00 MEDICARE ANNUAL WELLNESS VISIT, SUBSEQUENT: ICD-10-CM

## 2019-08-12 LAB
FOLATE SERPL-MCNC: 9.8 NG/ML
HBA1C MFR BLD: 5.3 % OF TOTAL HGB
TSH SERPL-ACNC: 3.44 MIU/L (ref 0.4–4.5)
VIT B12 SERPL-MCNC: 642 PG/ML (ref 200–1100)

## 2019-08-12 PROCEDURE — G0439 PPPS, SUBSEQ VISIT: HCPCS | Performed by: FAMILY MEDICINE

## 2019-08-12 PROCEDURE — 99214 OFFICE O/P EST MOD 30 MIN: CPT | Performed by: FAMILY MEDICINE

## 2019-08-12 RX ORDER — LISINOPRIL 5 MG/1
5 TABLET ORAL 2 TIMES DAILY
Refills: 0 | COMMUNITY
Start: 2019-08-01

## 2019-08-12 RX ORDER — CHLORHEXIDINE GLUCONATE 0.12 MG/ML
RINSE ORAL
Refills: 0 | COMMUNITY
Start: 2019-07-29

## 2019-08-12 NOTE — PROGRESS NOTES
Assessment and Plan:     Problem List Items Addressed This Visit        Endocrine    Hypothyroidism - Primary    Relevant Orders    TSH, 3rd generation with Free T4 reflex    Impaired fasting glucose       Cardiovascular and Mediastinum    Hypertension (Chronic)    Relevant Medications    lisinopril (ZESTRIL) 5 mg tablet       Other    Hyperlipidemia      Other Visit Diagnoses     Medicare annual wellness visit, subsequent            Mini-cog 5/5 today  Give package of advance directive          History of Present Illness:     Patient presents for Medicare Annual Wellness visit    Patient Care Team:  Jennifer Bryant MD as PCP - MD Keysha Louise CRNP Marino Council, MD Michae Mania, MD Angelena Pang, MD Glorine Bucks, MD Kelle Spiro, Eloy Wiley MD     Problem List:     Patient Active Problem List   Diagnosis    Lumbar stenosis with neurogenic claudication    Anxiety    Depression    Hypertension    Fibromyalgia    Hyperlipidemia    Migraine    S/P Lumbar laminectomy and decompressio L2-S1    Neuropathy    Lumbar radiculopathy    Abnormal chest x-ray    Back pain    Chronic osteomyelitis of left foot ([facility] [address] )    Depression with anxiety    Esophageal reflux    Generalized pain    Headache    History of DVT (deep vein thrombosis)    Hypothyroidism    Impaired fasting glucose    Insomnia    Knee osteoarthritis    Lung mass    Lyme disease    Memory loss    PAF (paroxysmal atrial fibrillation) (HCC)    Peripheral neuropathy, hereditary/idiopathic    Pleural nodules    Solitary fibrous tumor    Statin intolerance    Tinnitus    Varicose veins      Past Medical and Surgical History:     Past Medical History:   Diagnosis Date    CTS (carpal tunnel syndrome)     unspecified laterality    DVT (deep venous thrombosis) ([address] )     left leg, s/p IVC filter 11/2015    Endometriosis     Fibromyalgia     Hypercholesteremia     Hypertension     Hypothyroidism     Lyme disease     treated 8/2015    Migraine     PAF (paroxysmal atrial fibrillation) (HCC)     s/p ablation [facility] (sees Dr Thanh Al[facility]ida at [facility])   Cj Kim Peripheral neuropathy     Solitary pulmonary nodule on lung CT      Past Surgical History:   Procedure Laterality Date    ABDOMINAL HYSTERECTOMY      APPENDECTOMY      ATRIAL ABLATION SURGERY      cath    BREAST SURGERY Left     puncture aspiration of cyst onset 1972    CHOLECYSTECTOMY      onset 1981    COLONOSCOPY      fiberoptic    FOOT SURGERY Bilateral     onset 1993- removal of mortons neuroma    HAND SURGERY      gaglion cyst removal    HAND SURGERY      onset 1991 - carpal tunnel    IVC FILTER INSERTION      KNEE ARTHROSCOPY Left     therapeutic     LYMPH NODE BIOPSY      1996 onset    SC ARTHRODESIS POSTERIOR/POSTEROLATERAL LUMBAR N/A 3/29/2016    Procedure: L2-S1 LAMINECTOMY;  Surgeon: Sandra Quiros DO;  Location: [facility];  Service: Orthopedics    TOTAL ABDOMINAL HYSTERECTOMY      onset 1989      Family History:     Family History   Problem Relation Age of Onset    Heart disease Father     ADD / ADHD Father     Stroke Father     Cancer Brother         prostate    Heart attack Maternal Grandmother         acute MI    Cancer Family       Social History:     Social History     Tobacco Use   Smoking Status Never Smoker   Smokeless Tobacco Never Used     Social History     Substance and Sexual Activity   Alcohol Use No     Social History     Substance and Sexual Activity   Drug Use No      Medications and Allergies:     Current Outpatient Medications   Medication Sig Dispense Refill    aspirin (ECOTRIN LOW STRENGTH) 81 mg EC tablet Take 81 mg by mouth daily      Biotin 5 MG CAPS Take 5,000 mcg by mouth 2 (two) times a day      chlorhexidine (PERIDEX) 0 12 % solution RINSE MOUTH WITH 15 ML (1 CAPFUL) FOR 30 SECONDS IN MORNING AND E   (REFER TO PRESCRIPTION NOTES)    0    Cholecalciferol (VITAMIN D3) 2000 UNITS capsule Take 2,000 Units by mouth daily   DULoxetine (CYMBALTA) 30 mg delayed release capsule Take 3 capsules (90 mg dose) daily  (Patient taking differently: 30 mg daily Take 3 capsules (90 mg dose) daily  ) 30 capsule 0    DULoxetine (CYMBALTA) 60 mg delayed release capsule Take 60 mg by mouth daily      ezetimibe (ZETIA) 10 mg tablet Take 10 mg by mouth daily   gabapentin (NEURONTIN) 300 mg capsule Take 3 capsules at bedtime 30 capsule 0    levothyroxine 88 mcg tablet TAKE 1 TABLET BY MOUTH  EVERY DAY 90 tablet 3    lisinopril (ZESTRIL) 5 mg tablet Take 5 mg by mouth 2 (two) times a day  0    Menthol 5 4 MG LOZG Take 1 lozenge every 2 hours as needed  0    topiramate (TOPAMAX) 50 MG tablet Take 1 tablet (50 mg total) by mouth daily 90 tablet 1    Multiple Vitamins-Minerals (OCUVITE EXTRA PO) Take by mouth       No current facility-administered medications for this visit  Allergies   Allergen Reactions    Betaine     Cranberry Extract     Cranberry Juice Powder     Isoflavones      Other reaction(s): Itching    Latex      Other reaction(s): Rash and itching    Soybean-Containing Drug Products     Voltaren [Diclofenac Sodium]       Immunizations:     Immunization History   Administered Date(s) Administered    Influenza Split High Dose Preservative Free IM 10/14/2015, 10/21/2016, 10/30/2017    Influenza TIV (IM) 11/20/2012    Influenza, high dose seasonal 0 5 mL 11/06/2018    Pneumococcal Conjugate 13-Valent 10/14/2015    Pneumococcal Polysaccharide PPV23 01/01/2007, 04/27/2017      Medicare Screening Tests and Risk Assessments:     Spencer Lino is here for her Subsequent Wellness visit  Health Risk Assessment:  Patient rates overall health as good  Patient feels that their physical health rating is Same  Eyesight was rated as Slightly worse  Hearing was rated as Same  Patient feels that their emotional and mental health rating is Same   Pain experienced by patient in the last 7 days has been Some  Patient's pain rating has been 5/10  Emotional/Mental Health:  Patient has not been feeling nervous/anxious  PHQ-9 Depression Screening:    Frequency of the following problems over the past two weeks:      1  Little interest or pleasure in doing things: 0 - not at all      2  Feeling down, depressed, or hopeless: 0 - not at all  PHQ-2 Score: 0          Broken Bones/Falls: Fall Risk Assessment:    In the past year, patient has experienced: History of falling in past year    Number of falls: 2 or more    Injured during fall: No          Bladder/Bowel:  Patient has leaked urine accidently in the last six months  Patient reports no loss of bowel control  Immunizations:  Patient has had a flu vaccination within the last year  Patient has received a pneumonia shot  Patient has not received tetanus/diphtheria shot  Home Safety:  Patient has trouble with stairs inside or outside of their home  Patient currently reports that there are safety hazards present in home , working smoke alarms, working carbon monoxide detectors  Preventative Screenings:   No breast cancer screening performed, no colon cancer screen completed, cholesterol screen completed, glaucoma eye exam completed,     Nutrition:  Current diet: Low Cholesterol, Low Saturated Fat, Low Carb and Limited junk food with servings of the following:    Medications:  Patient is currently taking over-the-counter supplements  Patient is able to manage medications  Lifestyle Choices:  Patient reports no tobacco use  Patient has not smoked or used tobacco in the past   Patient reports alcohol use  Alcohol use per week: Rarely  Patient drives a vehicle  Patient wears seat belt  Current level of exercise of physical activity described by patient as: Low due to balance issues and fatigue          Activities of Daily Living:  Can get out of bed by his or her self, able to dress self, able to make own meals, unable to do own shopping, able to bathe self, can do own laundry/housekeeping, can manage own money, pay bills and track expenses    Previous Hospitalizations:  No hospitalization or ED visit in past 12 months        Advanced Directives:  Patient has not decided on power of   Patient has not completed advanced directive  Preventative Screening/Counseling:      Cardiovascular:      General: Risks and Benefits Discussed and Screening Current          Diabetes:      General: Risks and Benefits Discussed and Screening Current          Colorectal Cancer:      General: Risks and Benefits Discussed and Screening Not Indicated          Breast Cancer:      General: Risks and Benefits Discussed and Screening Not Indicated          Advanced Directives:   Patient has no living will for healthcare, does not have durable POA for healthcare, patient does not have an advanced directive  Information on ACP and/or AD provided  5 wishes given       Immunizations:      Influenza: Risks & Benefits Discussed and Influenza Recommended Annually      Pneumococcal: Risks & Benefits Discussed and Lifetime Vaccine Completed

## 2019-08-12 NOTE — PATIENT INSTRUCTIONS
Obesity   AMBULATORY CARE:   Obesity  is when your body mass index (BMI) is greater than 30  Your healthcare provider will use your height and weight to measure your BMI  The risks of obesity include  many health problems, such as injuries or physical disability  You may need tests to check for the following:  · Diabetes     · High blood pressure or high cholesterol     · Heart disease     · Gallbladder or liver disease     · Cancer of the colon, breast, prostate, liver, or kidney     · Sleep apnea     · Arthritis or gout  Seek care immediately if:   · You have a severe headache, confusion, or difficulty speaking  · You have weakness on one side of your body  · You have chest pain, sweating, or shortness of breath  Contact your healthcare provider if:   · You have symptoms of gallbladder or liver disease, such as pain in your upper abdomen  · You have knee or hip pain and discomfort while walking  · You have symptoms of diabetes, such as intense hunger and thirst, and frequent urination  · You have symptoms of sleep apnea, such as snoring or daytime sleepiness  · You have questions or concerns about your condition or care  Treatment for obesity  focuses on helping you lose weight to improve your health  Even a small decrease in BMI can reduce the risk for many health problems  Your healthcare provider will help you set a weight-loss goal   · Lifestyle changes  are the first step in treating obesity  These include making healthy food choices and getting regular physical activity  Your healthcare provider may suggest a weight-loss program that involves coaching, education, and therapy  · Medicine  may help you lose weight when it is used with a healthy diet and physical activity  · Surgery  can help you lose weight if you are very obese and have other health problems  There are several types of weight-loss surgery  Ask your healthcare provider for more information    Be successful losing weight:   · Set small, realistic goals  An example of a small goal is to walk for 20 minutes 5 days a week  Anther goal is to lose 5% of your body weight  · Tell friends, family members, and coworkers about your goals  and ask for their support  Ask a friend to lose weight with you, or join a weight-loss support group  · Identify foods or triggers that may cause you to overeat , and find ways to avoid them  Remove tempting high-calorie foods from your home and workplace  Place a bowl of fresh fruit on your kitchen counter  If stress causes you to eat, then find other ways to cope with stress  · Keep a diary to track what you eat and drink  Also write down how many minutes of physical activity you do each day  Weigh yourself once a week and record it in your diary  Eating changes: You will need to eat 500 to 1,000 fewer calories each day than you currently eat to lose 1 to 2 pounds a week  The following changes will help you cut calories:  · Eat smaller portions  Use small plates, no larger than 9 inches in diameter  Fill your plate half full of fruits and vegetables  Measure your food using measuring cups until you know what a serving size looks like  · Eat 3 meals and 1 or 2 snacks each day  Plan your meals in advance  [employer] and eat at home most of the time  Eat slowly  · Eat fruits and vegetables at every meal   They are low in calories and high in fiber, which makes you feel full  Do not add butter, margarine, or cream sauce to vegetables  Use herbs to season steamed vegetables  · Eat less fat and fewer fried foods  Eat more baked or grilled chicken and fish  These protein sources are lower in calories and fat than red meat  Limit fast food  Dress your salads with olive oil and vinegar instead of bottled dressing  · Limit the amount of sugar you eat  Do not drink sugary beverages  Limit alcohol  Activity changes:  Physical activity is good for your body in many ways   It helps you burn calories and build strong muscles  It decreases stress and depression, and improves your mood  It can also help you sleep better  Talk to your healthcare provider before you begin an exercise program   · Exercise for at least 30 minutes 5 days a week  Start slowly  Set aside time each day for physical activity that you enjoy and that is convenient for you  It is best to do both weight training and an activity that increases your heart rate, such as walking, bicycling, or swimming  · Find ways to be more active  Do yard work and housecleaning  Walk up the stairs instead of using elevators  Spend your leisure time going to events that require walking, such as outdoor festivals or fairs  This extra physical activity can help you lose weight and keep it off  Follow up with your healthcare provider as directed: You may need to meet with a dietitian  Write down your questions so you remember to ask them during your visits  © 2017 [address] Information is for End User's use only and may not be sold, redistributed or otherwise used for commercial purposes  All illustrations and images included in CareNotes® are the copyrighted property of [employer]  or Aldo Perdomo  The above information is an  only  It is not intended as medical advice for individual conditions or treatments  Talk to your doctor, nurse or pharmacist before following any medical regimen to see if it is safe and effective for you  Urinary Incontinence   WHAT YOU NEED TO KNOW:   What is urinary incontinence? Urinary incontinence (UI) is when you lose control of your bladder  What causes UI? UI occurs because your bladder cannot store or empty urine properly  The following are the most common types of UI:  · Stress incontinence  is when you leak urine due to increased bladder pressure  This may happen when you cough, sneeze, or exercise       · Urge incontinence  is when you feel the need to urinate right away and leak urine accidentally  · Mixed incontinence  is when you have both stress and urge UI  What are the signs and symptoms of UI?   · You feel like your bladder does not empty completely when you urinate  · You urinate often and need to urinate immediately  · You leak urine when you sleep, or you wake up with the urge to urinate  · You leak urine when you cough, sneeze, exercise, or laugh  How is UI diagnosed? Your healthcare provider will ask how often you leak urine and whether you have stress or urge symptoms  Tell him which medicines you take, how often you urinate, and how much liquid you drink each day  You may need any of the following tests:  · Urine tests  may show infection or kidney function  · A pelvic exam  may be done to check for blockages  A pelvic exam will also show if your bladder, uterus, or other organs have moved out of place  · An x-ray, ultrasound, or CT  may show problems with parts of your urinary system  You may be given contrast liquid to help your organs show up better in the pictures  Tell the healthcare provider if you have ever had an allergic reaction to contrast liquid  Do not enter the MRI room with anything metal  Metal can cause serious injury  Tell the healthcare provider if you have any metal in or on your body  · A bladder scan  will show how much urine is left in your bladder after you urinate  You will be asked to urinate and then healthcare providers will use a small ultrasound machine to check the urine left in your bladder  · Cystometry  is used to check the function of your urinary system  Your healthcare provider checks the pressure in your bladder while filling it with fluid  Your bladder pressure may also be tested when your bladder is full and while you urinate  How is UI treated? · Medicines  can help strengthen your bladder control      · Electrical stimulation  is used to send a small amount of electrical energy to your pelvic floor muscles  This helps control your bladder function  Electrodes may be placed outside your body or in your rectum  For women, the electrodes may be placed in the vagina  · A bulking agent  may be injected into the wall of your urethra to make it thicker  This helps keep your urethra closed and decreases urine leakage  · Devices  such as a clamp, pessary, or tampon may help stop urine leaks  Ask your healthcare provider for more information about these and other devices  · Surgery  may be needed if other treatments do not work  Several types of surgery can help improve your bladder control  Ask your healthcare provider for more information about the surgery you may need  How can I manage my symptoms? · Do pelvic muscle exercises often  Your pelvic muscles help you stop urinating  Squeeze these muscles tight for 5 seconds, then relax for 5 seconds  Gradually work up to squeezing for 10 seconds  Do 3 sets of 15 repetitions a day, or as directed  This will help strengthen your pelvic muscles and improve bladder control  · A catheter  may be used to help empty your bladder  A catheter is a tiny, plastic tube that is put into your bladder to drain your urine  Your healthcare provider may tell you to use a catheter to prevent your bladder from getting too full and leaking urine  · Keep a UI record  Write down how often you leak urine and how much you leak  Make a note of what you were doing when you leaked urine  · Train your bladder  Go to the bathroom at set times, such as every 2 hours, even if you do not feel the urge to go  You can also try to hold your urine when you feel the urge to go  For example, hold your urine for 5 minutes when you feel the urge to go  As that becomes easier, hold your urine for 10 minutes  · Drink liquids as directed  Ask your healthcare provider how much liquid to drink each day and which liquids are best for you   You may need to limit the amount of liquid you drink to help control your urine leakage  Limit or do not have drinks that contain caffeine or alcohol  Do not drink any liquid right before you go to bed  · Prevent constipation  Eat a variety of high-fiber foods  Good examples are high-fiber cereals, beans, vegetables, and whole-grain breads  Prune juice may help make your bowel movement softer  Walking is the best way to trigger your intestines to have a bowel movement  · Exercise regularly and maintain a healthy weight  Ask your healthcare provider how much you should weigh and about the best exercise plan for you  Weight loss and exercise will decrease pressure on your bladder and help you control your leakage  Ask him to help you create a weight loss plan if you are overweight  When should I seek immediate care? · You have severe pain  · You are confused or cannot think clearly  When should I contact my healthcare provider? · You have a fever  · You see blood in your urine  · You have pain when you urinate  · You have new or worse pain, even after treatment  · Your mouth feels dry or you have vision changes  · Your urine is cloudy or smells bad  · You have questions or concerns about your condition or care  CARE AGREEMENT:   You have the right to help plan your care  Learn about your health condition and how it may be treated  Discuss treatment options with your caregivers to decide what care you want to receive  You always have the right to refuse treatment  The above information is an  only  It is not intended as medical advice for individual conditions or treatments  Talk to your doctor, nurse or pharmacist before following any medical regimen to see if it is safe and effective for you  © 2017 [address] Information is for End User's use only and may not be sold, redistributed or otherwise used for commercial purposes   All illustrations and images included in CareNotes® are the copyrighted property of [employer]  or Aldo Perdomo  Cigarette Smoking and Your Health   AMBULATORY CARE:   Risks to your health if you smoke:  Nicotine and other chemicals found in tobacco damage every cell in your body  Even if you are a light smoker, you have an increased risk for cancer, heart disease, and lung disease  If you are pregnant or have diabetes, smoking increases your risk for complications  Benefits to your health if you stop smoking:   · You decrease respiratory symptoms such as coughing, wheezing, and shortness of breath  · You reduce your risk for cancers of the lung, mouth, throat, kidney, bladder, pancreas, stomach, and cervix  If you already have cancer, you increase the benefits of chemotherapy  You also reduce your risk for cancer returning or a second cancer from developing  · You reduce your risk for heart disease, blood clots, heart attack, and stroke  · You reduce your risk for lung infections, and diseases such as pneumonia, asthma, chronic bronchitis, and emphysema  · Your circulation improves  More oxygen can be delivered to your body  If you have diabetes, you lower your risk for complications, such as kidney, artery, and eye diseases  You also lower your risk for nerve damage  Nerve damage can lead to amputations, poor vision, and blindness  · You improve your body's ability to heal and to fight infections  Benefits to the health of others if you stop smoking:  Tobacco is harmful to nonsmokers who breathe in your secondhand smoke  The following are ways the health of others around you may improve when you stop smoking:  · You lower the risks for lung cancer and heart disease in nonsmoking adults  · If you are pregnant, you lower the risk for miscarriage, early delivery, low birth weight, and stillbirth  You also lower your baby's risk for SIDS, obesity, developmental delay, and neurobehavioral problems, such as ADHD  · If you have children, you lower their risk for ear infections, colds, pneumonia, bronchitis, and asthma  For more information and support to stop smoking:   · Smokefree  gov  Phone: 8- 757 - 198-0720  Web Address: www smokefree  gov  Follow up with your healthcare provider as directed:  Write down your questions so you remember to ask them during your visits  © 2017 [address] Information is for End User's use only and may not be sold, redistributed or otherwise used for commercial purposes  All illustrations and images included in CareNotes® are the copyrighted property of [employer]  or Aldo Perdomo  The above information is an  only  It is not intended as medical advice for individual conditions or treatments  Talk to your doctor, nurse or pharmacist before following any medical regimen to see if it is safe and effective for you  Fall Prevention   AMBULATORY CARE:   Fall prevention  includes ways to make your home and other areas safer  It also includes ways you can move more carefully to prevent a fall  Health conditions that cause changes in your blood pressure, vision, or muscle strength and coordination may increase your risk for falls  Medicines may also increase your risk for falls if they make you dizzy, weak, or sleepy  Call 911 or have someone else call if:   · You have fallen and are unconscious  · You have fallen and cannot move part of your body  Contact your healthcare provider if:   · You have fallen and have pain or a headache  · You have questions or concerns about your condition or care  Fall prevention tips:   · Stand or sit up slowly  This may help you keep your balance and prevent falls  · Use assistive devices as directed  Your healthcare provider may suggest that you use a cane or walker to help you keep your balance  You may need to have grab bars put in your bathroom near the toilet or in the shower      · Wear shoes that fit well and have soles that   Wear shoes both inside and outside  Use slippers with good   Do not wear shoes with high heels  · Wear a personal alarm  This is a device that allows you to call 911 if you fall and need help  Ask your healthcare provider for more information  · Stay active  Exercise can help strengthen your muscles and improve your balance  Your healthcare provider may recommend water aerobics or walking  He or she may also recommend physical therapy to improve your coordination  Never start an exercise program without talking to your healthcare provider first      · Manage your medical conditions  Keep all appointments with your healthcare providers  Visit your eye doctor as directed  Home safety tips:   · Add items to prevent falls in the bathroom  Put nonslip strips on your bath or shower floor to prevent you from slipping  Use a bath mat if you do not have carpet in the bathroom  This will prevent you from falling when you step out of the bath or shower  Use a shower seat so you do not need to stand while you shower  Sit on the toilet or a chair in your bathroom to dry yourself and put on clothing  This will prevent you from losing your balance from drying or dressing yourself while you are standing  · Keep paths clear  Remove books, shoes, and other objects from walkways and stairs  Place cords for telephones and lamps out of the way so that you do not need to walk over them  Tape them down if you cannot move them  Remove small rugs  If you cannot remove a rug, secure it with double-sided tape  This will prevent you from tripping  · Install bright lights in your home  Use night lights to help light paths to the bathroom or kitchen  Always turn on the light before you start walking  · Keep items you use often on shelves within reach  Do not use a step stool to help you reach an item  · Paint or place reflective tape on the edges of your stairs    This will help you see the stairs better  Follow up with your healthcare provider as directed:  Write down your questions so you remember to ask them during your visits  © 2017 [address] Information is for End User's use only and may not be sold, redistributed or otherwise used for commercial purposes  All illustrations and images included in CareNotes® are the copyrighted property of [employer]  or Aldo Perdomo  The above information is an  only  It is not intended as medical advice for individual conditions or treatments  Talk to your doctor, nurse or pharmacist before following any medical regimen to see if it is safe and effective for you  Advance Directives   WHAT YOU NEED TO KNOW:   What are advance directives? Advance directives are legal documents that state your wishes and plans for medical care  These plans are made ahead of time in case you lose your ability to make decisions for yourself  Advance directives can apply to any medical decision, such as the treatments you want, and if you want to donate organs  What are the types of advance directives? There are many types of advance directives, and each state has rules about how to use them  You may choose a combination of any of the following:  · Living will: This is a written record of the treatment you want  You can also choose which treatments you do not want, which to limit, and which to stop at a certain time  This includes surgery, medicine, IV fluid, and tube feedings  · Durable power of  for healthcare [place] [facility]): This is a written record that states who you want to make healthcare choices for you when you are unable to make them for yourself  This person, called a proxy, is usually a family member or a friend  You may choose more than 1 proxy  · Do not resuscitate (DNR) order:  A DNR order is used in case your heart stops beating or you stop breathing   It is a request not to have certain forms of treatment, such as CPR  A DNR order may be included in other types of advance directives  · Medical directive: This covers the care that you want if you are in a coma, near death, or unable to make decisions for yourself  You can list the treatments you want for each condition  Treatment may include pain medicine, surgery, blood transfusions, dialysis, IV or tube feedings, and a ventilator (breathing machine)  · Values history: This document has questions about your views, beliefs, and how you feel and think about life  This information can help others choose the care that you would choose  Why are advance directives important? An advance directive helps you control your care  Although spoken wishes may be used, it is better to have your wishes written down  Spoken wishes can be misunderstood, or not followed  Treatments may be given even if you do not want them  An advance directive may make it easier for your family to make difficult choices about your care  How do I decide what to put in my advance directives? · Make informed decisions:  Make sure you fully understand treatments or care you may receive  Think about the benefits and problems your decisions could cause for you or your family  Talk to healthcare providers if you have concerns or questions before you write down your wishes  You may also want to talk with your [employer] or , or a   Check your state laws to make sure that what you put in your advance directive is legal      · Sign all forms:  Sign and date your advance directive when you have finished  You may also need 2 witnesses to sign the forms  Witnesses cannot be your doctor or his staff, your spouse, heirs or beneficiaries, people you owe money to, or your chosen proxy  Talk to your family, proxy, and healthcare providers about your advance directive  Give each person a copy, and keep one for yourself in a place you can get to easily   Do not keep it hidden or locked away  · Review and revise your plans: You can revise your advance directive at any time, as long as you are able to make decisions  Review your plan every year, and when there are changes in your life, or your health  When you make changes, let your family, proxy, and healthcare providers know  Give each a new copy  Where can I find more information? · [employer]  [address] , [address]  Phone: 9- [phone]  Phone: 2- 118 - [phone]  Web Address: http://www  aafp org  · [address] [facility])  [address] [address] [facility], [address] [facility] , [address] [place] [address]  Phone: [phone]  Phone: [phone]  Web Address: [place] [address]  CARE AGREEMENT:   You have the right to help plan your care  To help with this plan, you must learn about your health condition and treatment options  You must also learn about advance directives and how they are used  Work with your healthcare providers to decide what care will be used to treat you  You always have the right to refuse treatment  The above information is an  only  It is not intended as medical advice for individual conditions or treatments  Talk to your doctor, nurse or pharmacist before following any medical regimen to see if it is safe and effective for you  © 2017 [address] Information is for End User's use only and may not be sold, redistributed or otherwise used for commercial purposes  All illustrations and images included in CareNotes® are the copyrighted property of [employer]  or Aldo Perdomo

## 2019-08-12 NOTE — PROGRESS NOTES
Assessment/Plan:  Reviewed lab in 8/2019  CBC ok  CMP ok  TSH normal  hgA1C 5 3 normal  Lipid 172/77/58/97 ok  Vit B12 642 ok  Folate 9 8 normal     Give flu shot yearly  Got Prevnar 10/2015  Got Pneumovax 2017    3/2014 Dexa normal  Got script already from rheumatology  Fall precautions  Pt refused PT  Monitor weight  RTO in 6 months with labs  Diagnoses and all orders for this visit:    Hypothyroidism, unspecified type  Comments:  continue levothyroxine 88mcg daily  Orders:  -     TSH, 3rd generation with Free T4 reflex; Future    Impaired fasting glucose  Comments: Follows low carb diet  Essential hypertension  Comments:  continue lisinopril 5mg bid per cardiology  Hyperlipidemia, unspecified hyperlipidemia type  Comments:  continue zetia 10mg daily per cardiology  Medicare annual wellness visit, subsequent    Other orders  -     lisinopril (ZESTRIL) 5 mg tablet; Take 5 mg by mouth 2 (two) times a day  -     chlorhexidine (PERIDEX) 0 12 % solution; RINSE MOUTH WITH 15 ML (1 CAPFUL) FOR 30 SECONDS IN MORNING AND E   (REFER TO PRESCRIPTION NOTES)  -     Biotin 5 MG CAPS; Take 5,000 mcg by mouth 2 (two) times a day          Subjective:      Patient ID: Carole Lezama is a 78 y o  female  HPI    Pt is here by herself  Hypothyroidism---On levothyroxine 88 mcg daily  Feels fine  IFG---follows low carb diet  She lost 6 lbs in last 6 months  Will monitor  HTN---She is on lisinopril 5mg bid now  BP at home 140/70 per pt  Pt denies headache, vision change, SOB or CP  FU cardiology Dr Thanh Almeida for Afib s/p ablation 2006 yearly  Denies chest pain, palpitation etc  She is on ASA 81mg daily       Hyperlipidemia---she is on zetia 10mg daily per cardiology  Follows low fat diet  VitD deficiency---She is on vitD 2000IU daily       Fibromyalgia/lyme disease----Feels tired always  Always shoulders pain  FU rheumatology Dr Hemant Fay for fibromyalgia/lyme disease   On cymbalta 30mg am and 60mg pm       FU neurology for headache/neuropathy  On gabapentin 300mg qhs which helped some  She is on topamax 50mg daily which helped a lot        FU orthopedics for lumbar radiculopathy and lumbar spinal stenosis s/p L2-S2 laminectomy  Has balance problems  She uses cane/walker  Falls sometimes per pt       FU opthalmology yearly  Has hearing aid now       Live with , 3 dogs and 3 cats  Does all ADL's  Had falls but no injuries per pt  Pt refused physical therapy  Denies depression           The following portions of the patient's history were reviewed and updated as appropriate: allergies, current medications, past family history, past medical history, past social history, past surgical history and problem list     Review of Systems   Constitutional: Negative for appetite change, chills and fever  HENT: Negative for congestion, ear pain, sinus pain and sore throat  Eyes: Negative for discharge and itching  Respiratory: Negative for apnea, cough, chest tightness, shortness of breath and wheezing  Cardiovascular: Negative for chest pain, palpitations and leg swelling  Gastrointestinal: Negative for abdominal pain, anal bleeding, constipation, diarrhea, nausea and vomiting  Endocrine: Negative for cold intolerance, heat intolerance and polyuria  Genitourinary: Negative for difficulty urinating and dysuria  Musculoskeletal: Negative for arthralgias, back pain and myalgias  Skin: Negative for rash  Neurological: Negative for dizziness and headaches  Psychiatric/Behavioral: Negative for agitation  Objective:      /78 (BP Location: Left arm, Patient Position: Sitting, Cuff Size: Adult)   Pulse 80   Temp (!) 97 2 °F (36 2 °C) (Tympanic)   Resp 13   Ht 5' 5" (1 651 m)   Wt 58 6 kg (129 lb 3 2 oz)   SpO2 97%   BMI 21 50 kg/m²          Physical Exam   Constitutional: She appears well-developed  No distress  HENT:   Head: Normocephalic     Right Ear: External ear normal    Left Ear: External ear normal    Nose: Nose normal    Mouth/Throat: Oropharynx is clear and moist    Eyes: Pupils are equal, round, and reactive to light  Conjunctivae are normal  Right eye exhibits no discharge  Left eye exhibits no discharge  Neck: Normal range of motion  No thyromegaly present  Cardiovascular: Normal rate, regular rhythm and normal heart sounds  Exam reveals no gallop and no friction rub  No murmur heard  Pulmonary/Chest: Effort normal and breath sounds normal  No respiratory distress  She has no wheezes  She has no rales  She exhibits no tenderness  Abdominal: Soft  Bowel sounds are normal    Musculoskeletal: She exhibits no edema  Use walker   Lymphadenopathy:     She has no cervical adenopathy  Neurological: She is alert  Psychiatric: She has a normal mood and affect

## 2019-08-19 ENCOUNTER — OFFICE VISIT (OUTPATIENT)
Dept: NEUROLOGY | Facility: CLINIC | Age: 79
End: 2019-08-19
Payer: MEDICARE

## 2019-08-19 ENCOUNTER — TELEPHONE (OUTPATIENT)
Dept: NEUROLOGY | Facility: CLINIC | Age: 79
End: 2019-08-19

## 2019-08-19 VITALS
BODY MASS INDEX: 21.66 KG/M2 | WEIGHT: 130 LBS | DIASTOLIC BLOOD PRESSURE: 62 MMHG | HEART RATE: 80 BPM | RESPIRATION RATE: 14 BRPM | HEIGHT: 65 IN | SYSTOLIC BLOOD PRESSURE: 138 MMHG

## 2019-08-19 DIAGNOSIS — G62.9 POLYNEUROPATHY: ICD-10-CM

## 2019-08-19 DIAGNOSIS — G60.9 PERIPHERAL NEUROPATHY, HEREDITARY/IDIOPATHIC: ICD-10-CM

## 2019-08-19 DIAGNOSIS — M54.16 LUMBAR RADICULOPATHY: ICD-10-CM

## 2019-08-19 DIAGNOSIS — G43.009 MIGRAINE WITHOUT AURA AND WITHOUT STATUS MIGRAINOSUS, NOT INTRACTABLE: Primary | ICD-10-CM

## 2019-08-19 DIAGNOSIS — G43.709 CHRONIC MIGRAINE WITHOUT AURA WITHOUT STATUS MIGRAINOSUS, NOT INTRACTABLE: ICD-10-CM

## 2019-08-19 DIAGNOSIS — G62.9 NEUROPATHY: ICD-10-CM

## 2019-08-19 PROCEDURE — 99214 OFFICE O/P EST MOD 30 MIN: CPT | Performed by: PSYCHIATRY & NEUROLOGY

## 2019-08-19 RX ORDER — TOPIRAMATE 50 MG/1
50 TABLET, FILM COATED ORAL DAILY
Qty: 90 TABLET | Refills: 1 | Status: SHIPPED | OUTPATIENT
Start: 2019-08-19 | End: 2021-01-18 | Stop reason: SDUPTHER

## 2019-08-19 NOTE — ASSESSMENT & PLAN NOTE
She did have a prior laminectomy and her EMG did show a left L4-L5 radiculopathy she does experience a footdrop  She only utilized a brace for short period of times to lower extremity swelling  I have asked her to consider the use of another one as well as a referral for says physical therapy  At this point she informs me that she is quite busy with many other doctors appointments she could consider the referral for physical therapy in the future    I would like to  through our offices to get in touch with her medical equipment companies

## 2019-08-19 NOTE — ASSESSMENT & PLAN NOTE
She has a known idiopathic polyneuropathy  This occurs bilaterally  She is on gabapentin 900 mg at bedtime with benefit  Recent laboratory studies were normal except for slightly low protein level I have asked to add protein to her diet    Interim will obtain a repeat CMP and SPEP level

## 2019-08-19 NOTE — PROGRESS NOTES
Patient ID: Tavo Segal is a 78 y o  female  Assessment/Plan:    Peripheral neuropathy, hereditary/idiopathic  She has a known idiopathic polyneuropathy  This occurs bilaterally  She is on gabapentin 900 mg at bedtime with benefit  Recent laboratory studies were normal except for slightly low protein level I have asked to add protein to her diet  Interim will obtain a repeat CMP and SPEP level    Lumbar radiculopathy  She did have a prior laminectomy and her EMG did show a left L4-L5 radiculopathy she does experience a footdrop  She only utilized a brace for short period of times to lower extremity swelling  I have asked her to consider the use of another one as well as a referral for says physical therapy  At this point she informs me that she is quite busy with many other doctors appointments she could consider the referral for physical therapy in the future  I would like to  through our offices to get in touch with her medical equipment companies     We also discussed stem-cell therapy  I have informed her that is not currently fda approved for the treatment of neuropathy  We also discussed her left lower extremity swelling  I informed her that is most probably due to radicular symptoms and the lack of movement of the left leg  This is not caused by the known neuropathy  I encouraged her to be more active   Diagnoses and all orders for this visit:    Migraine without aura and without status migrainosus, not intractable    Polyneuropathy  -     Comprehensive metabolic panel; Future  -     Protein electrophoresis, serum; Future    Chronic migraine without aura without status migrainosus, not intractable  -     topiramate (TOPAMAX) 50 MG tablet;  Take 1 tablet (50 mg total) by mouth daily    Lumbar radiculopathy    Neuropathy    Peripheral neuropathy, hereditary/idiopathic    she is to follow up in 4 to 5 months she will call us if she would like to reconsider a referral to physical therapy    Subjective:    Missy Aleman is a [de-identified]  yo woman with a history of multiple medical problems including fibromyalgia, HTN, hyperlipidemia, and lumbar spinal degenerative disease and mild peripheral neuropathy who is returning to Neurology clinic for follow up  She was last evaluated by myself in February of 2019   Neurologic examination did reveal worsened left leg weakness compared to prior examination, with decreased reflexes in the left leg compared to the right and mild decreased vibratory sense  She did undergo EMG of the lower extremities which showed a polyneuropathy and a left l4/l5 radiculopathy in June of 2015  She was originally evaluated by dr Goldstein and neurontin was added  On gabapentin 900 mg at bedtime with relief    she did undergo a l2/S1 laminectomy and decompression in march 2016 and has noted improvement in the left leg weakness   She is now able to lift up her left leg   The left leg symptoms are better after the surgery but still have not returned to normal  she can uses canes while home but uses a walker when outside  She attributes radiculopathy due to lyme diease      She continues to have dysesthesias but the q h s  Dose of gabapentin is quite helpful  She does have a left-sided foot drop  She recently stopped using a brace due to swelling of the left foot  She had severe neuropathic pain when she ran out of the gabapentin  Her symptoms have improved since being on it      She underwent Doppler study that was negative for a clot and now she utilizes sneakers with some support  She is quite careful when going up and down steps  She denies any measurable difference since being off of the Mafo  brace  She is on Cymbalta 90 mg a day which is prescribed by her rheumatologist       Today she reports continued weakness of the left leg swelling of the left leg  She does utilize walker to ambulate and does have a left foot drop     The gabapentin does help her neuropathic pain  She questions the use of stem-cell therapy     Overall her migraines are well controlled on Topamax 50 mg a day           recent cmp normal except for protein of 5 8     B12, TSH folate CMP were all normal                                      The following portions of the patient's history were reviewed and updated as appropriate: She  has a past medical history of CTS (carpal tunnel syndrome), DVT (deep venous thrombosis) ([facility] [address] ), Endometriosis, Fibromyalgia, Hypercholesteremia, Hypertension, Hypothyroidism, Lyme disease, Migraine, PAF (paroxysmal atrial fibrillation) ([facility] [address] ), Peripheral neuropathy, and Solitary pulmonary nodule on lung CT  She  has a past surgical history that includes Appendectomy; Breast surgery (Left); Cholecystectomy; Abdominal hysterectomy; IVC FILTER INSERTION; pr arthrodesis posterior/posterolateral lumbar (N/A, 3/29/2016); Lymph node biopsy; Atrial ablation surgery; Colonoscopy; Foot surgery (Bilateral); Hand surgery; Hand surgery; Knee arthroscopy (Left); and Total abdominal hysterectomy  Her family history includes ADD / ADHD in her father; Cancer in her brother and family; Heart attack in her maternal grandmother; Heart disease in her father; Stroke in her father  She  reports that she has never smoked  She has never used smokeless tobacco  She reports that she does not drink alcohol or use drugs  Current Outpatient Medications   Medication Sig Dispense Refill    aspirin (ECOTRIN LOW STRENGTH) 81 mg EC tablet Take 81 mg by mouth daily      Biotin 5000 MCG CAPS Take 5,000 mcg by mouth 2 (two) times a day       chlorhexidine (PERIDEX) 0 12 % solution RINSE MOUTH WITH 15 ML (1 CAPFUL) FOR 30 SECONDS IN MORNING AND E   (REFER TO PRESCRIPTION NOTES)  0    Cholecalciferol (VITAMIN D3) 5000 units CAPS Take 5,000 Units by mouth daily       DULoxetine (CYMBALTA) 30 mg delayed release capsule Take 3 capsules (90 mg dose) daily   (Patient taking differently: 30 mg daily Take 3 capsules (90 mg dose) daily  ) 30 capsule 0    DULoxetine (CYMBALTA) 60 mg delayed release capsule Take 60 mg by mouth daily      ezetimibe (ZETIA) 10 mg tablet Take 10 mg by mouth daily   gabapentin (NEURONTIN) 300 mg capsule Take 3 capsules at bedtime 30 capsule 0    levothyroxine 88 mcg tablet TAKE 1 TABLET BY MOUTH  EVERY DAY 90 tablet 3    lisinopril (ZESTRIL) 5 mg tablet Take 5 mg by mouth 2 (two) times a day  0    Menthol 5 4 MG LOZG Take 1 lozenge every 2 hours as needed  0    topiramate (TOPAMAX) 50 MG tablet Take 1 tablet (50 mg total) by mouth daily 90 tablet 1    Multiple Vitamins-Minerals (OCUVITE EXTRA PO) Take by mouth       No current facility-administered medications for this visit  She is allergic to betaine; cranberry extract; cranberry juice powder; isoflavones; latex; soybean-containing drug products; and voltaren [diclofenac sodium]            Objective:    Blood pressure 138/62, pulse 80, resp  rate 14, height 5' 5" (1 651 m), weight 59 kg (130 lb)  Physical Exam   Constitutional: She appears well-developed  HENT:   Head: Normocephalic  Eyes: Pupils are equal, round, and reactive to light  Neurological:   Reflex Scores:       Tricep reflexes are 1+ on the right side and 1+ on the left side  Bicep reflexes are 2+ on the right side and 2+ on the left side  Patellar reflexes are Tr on the right side and Tr on the left side  Achilles reflexes are Tr on the right side and Tr on the left side  Psychiatric: Her speech is normal    Vitals reviewed  Neurological Exam  Mental Status   Oriented to person, place, time and situation  Speech is normal  Language is fluent with no aphasia  Cranial Nerves  CN III, IV, VI: Pupils equal round and reactive to light bilaterally  Motor    Normal muscle bulk throughout  Strength is 5/5 in the upper extremities     Plantar flexion was a 3/5 on the left side  Foot Dorsiflexion: 1/5/5 on the left side  Ankle Inversion: 1/5/5 on the left side  Ankle Eversion: 1/5/5 on the left side  Knee Flexion: 4/5/5 on the left side  Knee Extension: 4/5/5 on the left side  Hip Flexion: 5/5 on the right side and 5-/5  on the left side       Sensory  Light touch is normal in upper and lower extremities  Temperature is normal in upper and lower extremities  Vibratory sensation was decreased in the toes   Proprioception was intact  Temperature was intact in the lower extremities      Reflexes                                           Right                      Left  Biceps                                 2+                         2+  Triceps                                1+                         1+  Patellar                                Tr                         Tr  Achilles                                Tr                         Tr  Plantar                           Downgoing                Downgoing    Right pathological reflexes: Gilmar's absent  Left pathological reflexes: Gilmar's absent  Coordination  Right: Finger-to-nose normal   Left: Finger-to-nose normal     Gait Unable to rise from chair without using arms  He utilizes a rolling walker  She does have a left foot drop  Review of systems obtained by the medical assistant as below was reviewed with the patient at today's appointment    ROS:    Review of Systems   Constitutional: Negative  Negative for appetite change and fever  HENT: Negative  Negative for hearing loss, tinnitus, trouble swallowing and voice change  Eyes: Negative  Negative for photophobia and pain  Respiratory: Negative  Negative for shortness of breath  Cardiovascular: Negative  Negative for palpitations  Gastrointestinal: Negative  Negative for nausea and vomiting  Endocrine: Negative  Negative for cold intolerance and heat intolerance  Genitourinary: Negative  Negative for dysuria, frequency and urgency  Musculoskeletal: Negative  Negative for myalgias and neck pain  Skin: Negative  Negative for rash  Allergic/Immunologic: Negative  Neurological: Negative  Negative for dizziness, tremors, seizures, syncope, facial asymmetry, speech difficulty, weakness, light-headedness, numbness and headaches  Hematological: Negative  Does not bruise/bleed easily  Psychiatric/Behavioral: Negative  Negative for confusion, hallucinations and sleep disturbance  All other systems reviewed and are negative

## 2019-08-20 NOTE — TELEPHONE ENCOUNTER
MSW was able to connect with patient this date at [phone]  Patient requested names of orthotic companies be placed in the mail to her so she can research these companies  Patient's address confirmed  List of orthotic companies to be placed in the mail to patient on 8/21/19  MSW will include this writer's business card  Patient will contact this writer once she makes decision so that referral can be made

## 2019-08-20 NOTE — TELEPHONE ENCOUNTER
MSW located the following orthotics providers that are in patient's area:    [address] , [address]  Elie Newby (phone)  [phone] (fax)     [phone] Lisa Lawrence [address] , [address] [facility], [address] [place] [address] (phone)  [phone] (fax)    MSW attempted to reach patient to offer choice of orthotics company, but there was no answer  MSW left message requesting callback  Awaiting same

## 2019-08-21 DIAGNOSIS — G43.709 CHRONIC MIGRAINE WITHOUT AURA WITHOUT STATUS MIGRAINOSUS, NOT INTRACTABLE: ICD-10-CM

## 2019-08-21 RX ORDER — TOPIRAMATE 50 MG/1
50 TABLET, FILM COATED ORAL DAILY
Qty: 90 TABLET | Refills: 1 | Status: SHIPPED | OUTPATIENT
Start: 2019-08-21 | End: 2020-05-07

## 2019-08-29 NOTE — TELEPHONE ENCOUNTER
MSW had yet to hear back from patient regarding her choice of orthotic company, so MSW placed call to patient at [phone]  No answer  MSW left a message requesting callback  Awaiting same

## 2019-09-05 NOTE — TELEPHONE ENCOUNTER
MSW attempted to reach patient again this date at [phone]  No answer  MSW left message requesting callback  Awaiting same

## 2019-09-06 NOTE — TELEPHONE ENCOUNTER
MSW attempted to reach patient again this date at [phone]  No answer  MSW left message requesting callback  Awaiting same  Since there have been 3 unsuccessful attempts to reach patient, MSW will mail "Unable to Reach letter"  Same to be placed in the mail on 9/9/19  MSW will be available to patient should she call back

## 2019-09-18 NOTE — TELEPHONE ENCOUNTER
MSW received call from patient after she received the "Unable to Reach" letter  Patient stated that she knows that she received the list of orthotic companies in the mail but misplaced it  Patient requested that list be remailed to her along with information about each company so she can research which company she would like to be referred to  List of orthotics companies and info about [facility]/ placed in the mail to patient this date  Patient will review options and will notify this writer which company she would like to proceed with

## 2019-09-19 DIAGNOSIS — E03.9 HYPOTHYROIDISM, UNSPECIFIED TYPE: ICD-10-CM

## 2019-09-20 RX ORDER — LEVOTHYROXINE SODIUM 88 UG/1
88 TABLET ORAL DAILY
Qty: 90 TABLET | Refills: 3 | Status: SHIPPED | OUTPATIENT
Start: 2019-09-20 | End: 2020-10-19

## 2019-09-27 NOTE — TELEPHONE ENCOUNTER
MSW had yet to hear from patient regarding choice of orthotic provider, so MSW placed call to there at [phone]  No answer  MSW left message requesting callback  Awaiting same

## 2019-10-01 NOTE — TELEPHONE ENCOUNTER
MSW had yet to hear from patient regarding choice of orthotic provider, so MSW placed call to there at [phone]  No answer  MSW left message requesting callback  Awaiting same

## 2019-10-02 NOTE — TELEPHONE ENCOUNTER
MSW had yet to hear from patient regarding choice of orthotic provider, so MSW placed call to there at [phone]  No answer  MSW left message requesting callback  Awaiting same  Since there have been 3 unsuccessful attempts to reach patient, MSW will mail another "Unable to Reach" letter  Same placed in the mail this date  Dr Андрей Berumen updated       MSW will be available to patient/family as needed

## 2019-10-02 NOTE — TELEPHONE ENCOUNTER
MSW received callback from patient this date  Patient stated that she never received the list the second time from this writer  MSW offered to verbally review the 2 orthotic providers with her over the phone, but patient requested to the name/address/contact number for both companies mailed to her, as well as information about each company  Same placed in the mail this date  MSW removed the "Unable to Reach" letter from the mail since patient did call back

## 2019-10-09 NOTE — TELEPHONE ENCOUNTER
MSW received call from patient stating that she would like to get her foot brace from [facility] location  MSW phoned a number found online for "[employer] SUR[facility]ICAL [facility] in [facility] at [phone]  No answer  The recording stated that this writer reached the general voicemailbox for the [facility] location  MSW left message inquiring how script should be written and to confirm if their [facility] office in operational  MSW will await callback

## 2019-10-10 DIAGNOSIS — G62.9 NEUROPATHY: Primary | ICD-10-CM

## 2019-10-10 NOTE — TELEPHONE ENCOUNTER
MSW reached out to the [facility] location again this date at [phone] and spoke with Elie Summers advised that the [facility] location is operational, but that they only have hours at that location on Tuesdays and Thursdays  Elie Summers provided the address for their [facility] location as:    [address] [place] [address], [address]  [place], [place]     Elie Summers advised that script should be entered as Foot brace or orthotics  Susie asked that patient's demographics, neuro office visit note, and script be faxed to [facility] at [phone]  Elie Summers stated that they will then contact patient to schedule an appt  MSW will request script from Dr Filiberto Farrell

## 2019-10-11 NOTE — TELEPHONE ENCOUNTER
MSW faxed referral (patient's demographics, neuro office visit note, and script)to [facility] at [phone]  was sent  Successful notice received  Josue Veliz will contact patient to schedule an appt

## 2019-12-03 NOTE — TELEPHONE ENCOUNTER
MSW received a call from patient this date ([phone])  Patient stated that she just received her new shoes and that it took much longer than expected  Patient stated that she called [facility] to make an appt for her foot braces, and was told that since so much time has elapsed that they no longer have referral  Patient asked for referral to be re-sent  MSW phoned [facility] at [phone]  A recording stated that this was a [facility], but that it is now operated by [address] [place] [address]  MSW placed call to patient to confirm that she is agreeable for referral to be submitted to this location since it is now operated by [employer]  No answer at [phone] (no way to leave message) and no answer at [phone] (left message requesting callback

## 2019-12-09 NOTE — TELEPHONE ENCOUNTER
MSW had yet to hear back from patient, so MSW attempted to reach patient at [phone]  No answer at [phone]  MSW left a message requesting callback  MSW also attempted to reach patient's cell phone at [phone]  Patient's  answered, whom MSW confirmed to be on the Communication Consent  MSW explained that a referral was previously made to [facility], but they have been recently bought out by [employer]  MSW advised that this writer was calling to confirm if patient was agreeable to referral to [employer]  Patient's  took message and advised that he would ask patient to return this writer's call  MSW will await same

## 2019-12-10 NOTE — TELEPHONE ENCOUNTER
MSW attempted to reach patient at [phone]  No answer  MSW unable to leave message as voicemail was full  MSW then phoned patient's cell phone number listed ([phone])  Patient's  answered again, but was able to get patient on the line  MSW explained that [facility] was bought out by [employer]  Patient was agreeable to referral to [facility]  MSW advised that [facility] will call her to schedule  Patient requested this writer to ask them to call on her cell to [phone]  MSW faxed patient demographics, neuro office visit note, and script to [employer] at [phone]  Successful fax notice received

## 2020-01-03 ENCOUNTER — TELEPHONE (OUTPATIENT)
Dept: NEUROLOGY | Facility: CLINIC | Age: 80
End: 2020-01-03

## 2020-01-23 ENCOUNTER — TELEPHONE (OUTPATIENT)
Dept: NEUROLOGY | Facility: CLINIC | Age: 80
End: 2020-01-23

## 2020-01-23 NOTE — TELEPHONE ENCOUNTER
Deborah Chiu from [facility] called requesting evaluation for AFO  Per encounter 12/10/19-MSW faxed patient demographics, neuro office visit note, and script to [facility] at [phone]  Successful fax notice received  [facility] was having issue w/ their fax machine and script and office notes was never received  Requesting updated foot brace script along w/ most recent office notes be faxed to [phone] attn Rhoda Chung that medicare is showing the pt received off the shelve AFO in 2016  Medicare only cover braces once every 5 years  States that custom brace would be covered by medicare                   [phone]   Fax [phone]

## 2020-01-23 NOTE — TELEPHONE ENCOUNTER
She can be seen by Alexys Gilman or je in [place] office, please let pt know      After evaluation another script could potentially be ordered

## 2020-01-24 NOTE — TELEPHONE ENCOUNTER
ALBERTO      Spoke with patient  Advised her on below  Patient preferred the [place] office only and no earlier than [phone]  Was able to schedule for 4/1 at 1245 with Sowmya

## 2020-01-29 NOTE — TELEPHONE ENCOUNTER
Patient has an  appointment scheduled 4/01/2020  I put her on cancellation list if something were to be available sooner

## 2020-01-29 NOTE — TELEPHONE ENCOUNTER
ALBERTO    Called patient and she states that she has other things going on and this is not a great time for her to take today's appointment

## 2020-03-10 DIAGNOSIS — G62.9 POLYNEUROPATHY: ICD-10-CM

## 2020-03-10 RX ORDER — GABAPENTIN 300 MG/1
CAPSULE ORAL
Qty: 270 CAPSULE | Refills: 1 | Status: SHIPPED | OUTPATIENT
Start: 2020-03-10 | End: 2020-04-01 | Stop reason: SDUPTHER

## 2020-03-31 ENCOUNTER — TELEPHONE (OUTPATIENT)
Dept: NEUROLOGY | Facility: CLINIC | Age: 80
End: 2020-03-31

## 2020-04-01 ENCOUNTER — TELEMEDICINE (OUTPATIENT)
Dept: NEUROLOGY | Facility: CLINIC | Age: 80
End: 2020-04-01
Payer: MEDICARE

## 2020-04-01 DIAGNOSIS — G62.9 POLYNEUROPATHY: ICD-10-CM

## 2020-04-01 DIAGNOSIS — M21.372 LEFT FOOT DROP: Primary | ICD-10-CM

## 2020-04-01 PROCEDURE — 99442 PR PHYS/QHP TELEPHONE EVALUATION 11-20 MIN: CPT | Performed by: PHYSICIAN ASSISTANT

## 2020-04-01 RX ORDER — GABAPENTIN 300 MG/1
CAPSULE ORAL
Qty: 270 CAPSULE | Refills: 3 | Status: SHIPPED | OUTPATIENT
Start: 2020-04-01 | End: 2021-03-30

## 2020-05-04 DIAGNOSIS — G43.709 CHRONIC MIGRAINE WITHOUT AURA WITHOUT STATUS MIGRAINOSUS, NOT INTRACTABLE: ICD-10-CM

## 2020-05-07 RX ORDER — TOPIRAMATE 50 MG/1
TABLET, FILM COATED ORAL
Qty: 90 TABLET | Refills: 0 | Status: SHIPPED | OUTPATIENT
Start: 2020-05-07 | End: 2021-01-18 | Stop reason: SDUPTHER

## 2020-06-19 ENCOUNTER — TELEPHONE (OUTPATIENT)
Dept: NEUROLOGY | Facility: CLINIC | Age: 80
End: 2020-06-19

## 2020-06-25 ENCOUNTER — TELEPHONE (OUTPATIENT)
Dept: NEUROLOGY | Facility: CLINIC | Age: 80
End: 2020-06-25

## 2020-07-29 DIAGNOSIS — G43.909 MIGRAINE: Primary | ICD-10-CM

## 2020-07-30 RX ORDER — TOPIRAMATE 50 MG/1
TABLET, FILM COATED ORAL
Qty: 90 TABLET | Refills: 3 | Status: SHIPPED | OUTPATIENT
Start: 2020-07-30 | End: 2021-07-07 | Stop reason: SDUPTHER

## 2020-10-18 DIAGNOSIS — E03.9 HYPOTHYROIDISM, UNSPECIFIED TYPE: ICD-10-CM

## 2020-10-19 RX ORDER — LEVOTHYROXINE SODIUM 88 UG/1
TABLET ORAL
Qty: 90 TABLET | Refills: 0 | Status: SHIPPED | OUTPATIENT
Start: 2020-10-19 | End: 2021-01-08

## 2021-01-07 DIAGNOSIS — E03.9 HYPOTHYROIDISM, UNSPECIFIED TYPE: ICD-10-CM

## 2021-01-08 RX ORDER — LEVOTHYROXINE SODIUM 88 UG/1
TABLET ORAL
Qty: 90 TABLET | Refills: 0 | Status: SHIPPED | OUTPATIENT
Start: 2021-01-08 | End: 2021-03-30

## 2021-01-18 ENCOUNTER — OFFICE VISIT (OUTPATIENT)
Dept: FAMILY MEDICINE CLINIC | Facility: CLINIC | Age: 81
End: 2021-01-18
Payer: MEDICARE

## 2021-01-18 VITALS
WEIGHT: 132.6 LBS | RESPIRATION RATE: 16 BRPM | OXYGEN SATURATION: 98 % | BODY MASS INDEX: 22.09 KG/M2 | SYSTOLIC BLOOD PRESSURE: 142 MMHG | DIASTOLIC BLOOD PRESSURE: 76 MMHG | HEART RATE: 68 BPM | HEIGHT: 65 IN | TEMPERATURE: 97.2 F

## 2021-01-18 DIAGNOSIS — Z23 ENCOUNTER FOR IMMUNIZATION: ICD-10-CM

## 2021-01-18 DIAGNOSIS — G43.009 MIGRAINE WITHOUT AURA AND WITHOUT STATUS MIGRAINOSUS, NOT INTRACTABLE: ICD-10-CM

## 2021-01-18 DIAGNOSIS — E55.9 VITAMIN D DEFICIENCY: ICD-10-CM

## 2021-01-18 DIAGNOSIS — E03.9 HYPOTHYROIDISM, UNSPECIFIED TYPE: ICD-10-CM

## 2021-01-18 DIAGNOSIS — I10 ESSENTIAL HYPERTENSION: Chronic | ICD-10-CM

## 2021-01-18 DIAGNOSIS — R07.81 RIB PAIN ON LEFT SIDE: Primary | ICD-10-CM

## 2021-01-18 DIAGNOSIS — M79.7 FIBROMYALGIA: ICD-10-CM

## 2021-01-18 DIAGNOSIS — R73.01 IMPAIRED FASTING GLUCOSE: ICD-10-CM

## 2021-01-18 DIAGNOSIS — E78.5 HYPERLIPIDEMIA, UNSPECIFIED HYPERLIPIDEMIA TYPE: ICD-10-CM

## 2021-01-18 DIAGNOSIS — F41.8 DEPRESSION WITH ANXIETY: ICD-10-CM

## 2021-01-18 PROCEDURE — 90662 IIV NO PRSV INCREASED AG IM: CPT

## 2021-01-18 PROCEDURE — 99214 OFFICE O/P EST MOD 30 MIN: CPT | Performed by: FAMILY MEDICINE

## 2021-01-18 PROCEDURE — G0008 ADMIN INFLUENZA VIRUS VAC: HCPCS

## 2021-01-18 NOTE — PROGRESS NOTES
Chief Complaint   Patient presents with    Follow-up     Routine overdue  Health Maintenance   Topic Date Due    COVID-19 Vaccine (1 of 2) 03/23/1956    DTaP,Tdap,and Td Vaccines (1 - Tdap) 03/23/1959    Falls: Plan of Care  03/23/2003   [facility] Medicare Annual Wellness Visit (AWV)  08/12/2020    Influenza Vaccine (1) 09/01/2020    Fall Risk  01/18/2022    BMI: Adult  01/18/2022    Depression Remission PHQ  01/18/2022    Pneumococcal Vaccine: 65+ Years  Completed    HIB Vaccine  Aged Out    Hepatitis B Vaccine  Aged Out    IPV Vaccine  Aged Out    Hepatitis A Vaccine  Aged Out    Meningococcal ACWY Vaccine  Aged Out    HPV Vaccine  Aged Out           Depression Screening Follow-up Plan: Patient's depression screening was positive with a PHQ-2 score of 1  Their PHQ-9 score was 4  Clinically patient does not have depression  No treatment is required  Assessment/Plan:    Hypothyroidism  Continue levothyroxine 88mcg daily  Check labs  Impaired fasting glucose  Low carb diet  Check labs  Hypertension  Controlled  DASH diet  Continue lisinopril 5mg bid  Hyperlipidemia  Low fat diet  Continue zetia  Depression with anxiety  Mood is ok  Continue cymbalta  Migraine  Stable  Continue topamax  FU neurology  Fibromyalgia  Stable  Continue cymbalta  FU rheumatology  Got flu shot yearly  Give flu shot today  Got Prevnar 10/2015  Got Pneumovax 2017    3/2014 Dexa normal  Got script already from rheumatology  Fall precautions  Pt refused PT  Monitor weight  RTO in 6 months with labs         Diagnoses and all orders for this visit:    Rib pain on left side  -     XR ribs 2 vw left; Future    Hypothyroidism, unspecified type  -     CBC and differential; Future  -     Comprehensive metabolic panel; Future  -     Lipid Panel with Direct LDL reflex; Future  -     TSH, 3rd generation with Free T4 reflex; Future  -     Vitamin D 25 hydroxy;  Future  -     Hemoglobin A1C; Future    Impaired fasting glucose  -     CBC and differential; Future  -     Comprehensive metabolic panel; Future  -     Lipid Panel with Direct LDL reflex; Future  -     TSH, 3rd generation with Free T4 reflex; Future  -     Vitamin D 25 hydroxy; Future  -     Hemoglobin A1C; Future    Essential hypertension  -     CBC and differential; Future  -     Comprehensive metabolic panel; Future  -     Lipid Panel with Direct LDL reflex; Future  -     TSH, 3rd generation with Free T4 reflex; Future  -     Vitamin D 25 hydroxy; Future  -     Hemoglobin A1C; Future    Depression with anxiety  -     CBC and differential; Future  -     Comprehensive metabolic panel; Future  -     Lipid Panel with Direct LDL reflex; Future  -     TSH, 3rd generation with Free T4 reflex; Future  -     Vitamin D 25 hydroxy; Future  -     Hemoglobin A1C; Future    Vitamin D deficiency  -     CBC and differential; Future  -     Comprehensive metabolic panel; Future  -     Lipid Panel with Direct LDL reflex; Future  -     TSH, 3rd generation with Free T4 reflex; Future  -     Vitamin D 25 hydroxy; Future  -     Hemoglobin A1C; Future    Hyperlipidemia, unspecified hyperlipidemia type    Migraine without aura and without status migrainosus, not intractable    Fibromyalgia    Encounter for immunization  -     influenza vaccine, high-dose, PF 0 7 mL (FLUZONE HIGH-DOSE)    Other orders  -     Cancel: TDAP VACCINE GREATER THAN OR EQUAL TO 6YO IM          Subjective:      Patient ID: Shauna Ortiz is a 78 y o  female  HPI    Pt is here by herself  Fatigue all the time  Left lower rib hurt for a while  Left side rib "pops" sometimes  Denies recent falls  Has balance problem and walks with her walker  Denies fever  Denies SOB, chest pain  Denies nausea, vomiting or diarrhea  Wt is stable compare to 8/2019  Hypothyroidism---On levothyroxine 88 mcg daily  No recent labs       IFG---She loves coffee ice cream recently     HTN---She is on lisinopril 5mg bid per cardiology  BP at home 140/70 per pt  Pt denies headache, vision change, SOB or CP  FU cardiology Dr Jared Sneed for Afib s/p ablation 2006 yearly  Denies chest pain, palpitation etc  She is on ASA 81mg daily       Hyperlipidemia---she is on zetia 10mg daily per cardiology  Follows low fat diet       VitD deficiency---She is on vitD 2000IU daily       Fibromyalgia/lyme disease----Feels tired always  Always shoulders/back pain  FU rheumatology Dr Julio César Abarca for fibromyalgia/lyme disease  On cymbalta 30mg am and 60mg pm       FU neurology for headache/neuropathy  On gabapentin 300mg qhs which helped some  She is on topamax 50mg daily which helped a lot        FU orthopedics for lumbar radiculopathy and lumbar spinal stenosis s/p L2-S2 laminectomy in 2016  Has balance problems  She uses cane/walker       FU opthalmology yearly  Has hearing aid now       Live with  who is 80years old, 3 dogs and 2 cats now  Does all ADL's  Still drive  Adopted 2 sons  Son Guanako Holguin helped them sometimes  Another son who has bipolar does not help them  No recent falls  Refused PT  Denies depression         The following portions of the patient's history were reviewed and updated as appropriate: allergies, current medications, past family history, past medical history, past social history, past surgical history and problem list     Review of Systems   Constitutional: Positive for fatigue  Negative for appetite change, chills and fever  HENT: Negative for congestion, ear pain, sinus pain and sore throat  Eyes: Negative for discharge and itching  Respiratory: Negative for apnea, cough, chest tightness, shortness of breath and wheezing  Cardiovascular: Negative for chest pain, palpitations and leg swelling  Gastrointestinal: Negative for abdominal pain, anal bleeding, constipation, diarrhea, nausea and vomiting     Endocrine: Negative for cold intolerance, heat intolerance and polyuria  Genitourinary: Negative for difficulty urinating and dysuria  Musculoskeletal: Positive for arthralgias and back pain  Negative for myalgias  Skin: Negative for rash  Neurological: Negative for dizziness and headaches  Psychiatric/Behavioral: Negative for agitation  Objective:      /76 (BP Location: Left arm, Patient Position: Sitting, Cuff Size: Adult)   Pulse 68   Temp (!) 97 2 °F (36 2 °C) (Temporal)   Resp 16   Ht 5' 5" (1 651 m)   Wt 60 1 kg (132 lb 9 6 oz)   SpO2 98%   BMI 22 07 kg/m²          Physical Exam  Constitutional:       General: She is not in acute distress  Appearance: She is well-developed  HENT:      Head: Normocephalic  Eyes:      General:         Right eye: No discharge  Left eye: No discharge  Conjunctiva/sclera: Conjunctivae normal    Neck:      Musculoskeletal: Normal range of motion  Thyroid: No thyromegaly  Cardiovascular:      Rate and Rhythm: Normal rate and regular rhythm  Heart sounds: Normal heart sounds  No murmur  No friction rub  No gallop  Pulmonary:      Effort: Pulmonary effort is normal  No respiratory distress  Breath sounds: Normal breath sounds  No wheezing or rales  Chest:      Chest wall: No tenderness  Abdominal:      General: Bowel sounds are normal  There is no distension  Palpations: Abdomen is soft  There is no mass  Tenderness: There is no abdominal tenderness  There is no guarding or rebound  Musculoskeletal: Normal range of motion  General: Swelling present  No tenderness or deformity  Comments: Left lower rib swelling and tenderness, no bruise   Lymphadenopathy:      Cervical: No cervical adenopathy  Neurological:      Mental Status: She is alert

## 2021-02-23 ENCOUNTER — IMMUNIZATIONS (OUTPATIENT)
Dept: FAMILY MEDICINE CLINIC | Facility: HOSPITAL | Age: 81
End: 2021-02-23

## 2021-02-23 DIAGNOSIS — Z23 ENCOUNTER FOR IMMUNIZATION: Primary | ICD-10-CM

## 2021-02-23 PROCEDURE — 91300 SARS-COV-2 / COVID-19 MRNA VACCINE (PFIZER-BIONTECH) 30 MCG: CPT

## 2021-02-23 PROCEDURE — 0001A SARS-COV-2 / COVID-19 MRNA VACCINE (PFIZER-BIONTECH) 30 MCG: CPT

## 2021-03-17 ENCOUNTER — IMMUNIZATIONS (OUTPATIENT)
Dept: FAMILY MEDICINE CLINIC | Facility: HOSPITAL | Age: 81
End: 2021-03-17

## 2021-03-17 DIAGNOSIS — Z23 ENCOUNTER FOR IMMUNIZATION: Primary | ICD-10-CM

## 2021-03-17 PROCEDURE — 0002A SARS-COV-2 / COVID-19 MRNA VACCINE (PFIZER-BIONTECH) 30 MCG: CPT

## 2021-03-17 PROCEDURE — 91300 SARS-COV-2 / COVID-19 MRNA VACCINE (PFIZER-BIONTECH) 30 MCG: CPT

## 2021-03-29 DIAGNOSIS — E03.9 HYPOTHYROIDISM, UNSPECIFIED TYPE: ICD-10-CM

## 2021-03-29 DIAGNOSIS — G62.9 POLYNEUROPATHY: ICD-10-CM

## 2021-03-30 RX ORDER — GABAPENTIN 300 MG/1
CAPSULE ORAL
Qty: 270 CAPSULE | Refills: 3 | Status: SHIPPED | OUTPATIENT
Start: 2021-03-30 | End: 2022-06-01 | Stop reason: ALTCHOICE

## 2021-03-30 RX ORDER — LEVOTHYROXINE SODIUM 88 UG/1
TABLET ORAL
Qty: 90 TABLET | Refills: 0 | Status: SHIPPED | OUTPATIENT
Start: 2021-03-30 | End: 2021-06-19

## 2021-03-30 NOTE — TELEPHONE ENCOUNTER
I called and reminded patient  She has been having trouble fasting which has stopped her from getting to the lab

## 2021-04-16 LAB
25(OH)D3 SERPL-MCNC: 58 NG/ML (ref 30–100)
ALBUMIN SERPL-MCNC: 3.9 G/DL (ref 3.6–5.1)
ALBUMIN/GLOB SERPL: 1.8 (CALC) (ref 1–2.5)
ALP SERPL-CCNC: 76 U/L (ref 37–153)
ALT SERPL-CCNC: 8 U/L (ref 6–29)
AST SERPL-CCNC: 20 U/L (ref 10–35)
BASOPHILS # BLD AUTO: 50 CELLS/UL (ref 0–200)
BASOPHILS NFR BLD AUTO: 0.9 %
BILIRUB SERPL-MCNC: 0.5 MG/DL (ref 0.2–1.2)
BUN SERPL-MCNC: 23 MG/DL (ref 7–25)
BUN/CREAT SERPL: NORMAL (CALC) (ref 6–22)
CALCIUM SERPL-MCNC: 9.8 MG/DL (ref 8.6–10.4)
CHLORIDE SERPL-SCNC: 107 MMOL/L (ref 98–110)
CHOLEST SERPL-MCNC: 218 MG/DL
CHOLEST/HDLC SERPL: 3.3 (CALC)
CO2 SERPL-SCNC: 27 MMOL/L (ref 20–32)
CREAT SERPL-MCNC: 0.76 MG/DL (ref 0.6–0.88)
EOSINOPHIL # BLD AUTO: 330 CELLS/UL (ref 15–500)
EOSINOPHIL NFR BLD AUTO: 6 %
ERYTHROCYTE [DISTWIDTH] IN BLOOD BY AUTOMATED COUNT: 13.1 % (ref 11–15)
GLOBULIN SER CALC-MCNC: 2.2 G/DL (CALC) (ref 1.9–3.7)
GLUCOSE SERPL-MCNC: 93 MG/DL (ref 65–99)
HBA1C MFR BLD: 5.2 % OF TOTAL HGB
HCT VFR BLD AUTO: 40.2 % (ref 35–45)
HDLC SERPL-MCNC: 66 MG/DL
HGB BLD-MCNC: 13 G/DL (ref 11.7–15.5)
LDLC SERPL CALC-MCNC: 131 MG/DL (CALC)
LYMPHOCYTES # BLD AUTO: 990 CELLS/UL (ref 850–3900)
LYMPHOCYTES NFR BLD AUTO: 18 %
MCH RBC QN AUTO: 30.5 PG (ref 27–33)
MCHC RBC AUTO-ENTMCNC: 32.3 G/DL (ref 32–36)
MCV RBC AUTO: 94.4 FL (ref 80–100)
MONOCYTES # BLD AUTO: 468 CELLS/UL (ref 200–950)
MONOCYTES NFR BLD AUTO: 8.5 %
NEUTROPHILS # BLD AUTO: 3663 CELLS/UL (ref 1500–7800)
NEUTROPHILS NFR BLD AUTO: 66.6 %
NONHDLC SERPL-MCNC: 152 MG/DL (CALC)
PLATELET # BLD AUTO: 264 THOUSAND/UL (ref 140–400)
PMV BLD REES-ECKER: 10.3 FL (ref 7.5–12.5)
POTASSIUM SERPL-SCNC: 4.6 MMOL/L (ref 3.5–5.3)
PROT SERPL-MCNC: 6.1 G/DL (ref 6.1–8.1)
RBC # BLD AUTO: 4.26 MILLION/UL (ref 3.8–5.1)
SL AMB EGFR AFRICAN AMERICAN: 84 ML/MIN/1.73M2
SL AMB EGFR NON AFRICAN AMERICAN: 73 ML/MIN/1.73M2
SODIUM SERPL-SCNC: 140 MMOL/L (ref 135–146)
TRIGL SERPL-MCNC: 106 MG/DL
TSH SERPL-ACNC: 2.82 MIU/L (ref 0.4–4.5)
WBC # BLD AUTO: 5.5 THOUSAND/UL (ref 3.8–10.8)

## 2021-06-18 DIAGNOSIS — E03.9 HYPOTHYROIDISM, UNSPECIFIED TYPE: ICD-10-CM

## 2021-06-19 RX ORDER — LEVOTHYROXINE SODIUM 88 UG/1
TABLET ORAL
Qty: 90 TABLET | Refills: 3 | Status: SHIPPED | OUTPATIENT
Start: 2021-06-19

## 2021-08-05 ENCOUNTER — TELEPHONE (OUTPATIENT)
Dept: FAMILY MEDICINE CLINIC | Facility: CLINIC | Age: 81
End: 2021-08-05

## 2021-08-05 NOTE — TELEPHONE ENCOUNTER
Pt's  passed away on 07/19 and needs to make a new will due to family situation  Kenney Tejeda is requesting a letter stating that pt is in full mental capacity to sign legal documents

## 2021-09-02 ENCOUNTER — OFFICE VISIT (OUTPATIENT)
Dept: FAMILY MEDICINE CLINIC | Facility: CLINIC | Age: 81
End: 2021-09-02
Payer: MEDICARE

## 2021-09-02 VITALS
DIASTOLIC BLOOD PRESSURE: 80 MMHG | SYSTOLIC BLOOD PRESSURE: 120 MMHG | BODY MASS INDEX: 23.18 KG/M2 | HEIGHT: 64 IN | WEIGHT: 135.8 LBS | OXYGEN SATURATION: 96 % | HEART RATE: 62 BPM

## 2021-09-02 DIAGNOSIS — E03.9 HYPOTHYROIDISM, UNSPECIFIED TYPE: Primary | ICD-10-CM

## 2021-09-02 DIAGNOSIS — Z78.0 POSTMENOPAUSAL: ICD-10-CM

## 2021-09-02 DIAGNOSIS — Z00.00 MEDICARE ANNUAL WELLNESS VISIT, SUBSEQUENT: ICD-10-CM

## 2021-09-02 DIAGNOSIS — G60.9 PERIPHERAL NEUROPATHY, HEREDITARY/IDIOPATHIC: ICD-10-CM

## 2021-09-02 DIAGNOSIS — M79.7 FIBROMYALGIA: ICD-10-CM

## 2021-09-02 DIAGNOSIS — R41.3 MEMORY LOSS: ICD-10-CM

## 2021-09-02 DIAGNOSIS — E78.5 HYPERLIPIDEMIA, UNSPECIFIED HYPERLIPIDEMIA TYPE: ICD-10-CM

## 2021-09-02 DIAGNOSIS — Z23 ENCOUNTER FOR IMMUNIZATION: ICD-10-CM

## 2021-09-02 DIAGNOSIS — I10 ESSENTIAL HYPERTENSION: Chronic | ICD-10-CM

## 2021-09-02 DIAGNOSIS — R73.01 IMPAIRED FASTING GLUCOSE: ICD-10-CM

## 2021-09-02 DIAGNOSIS — G44.89 OTHER HEADACHE SYNDROME: ICD-10-CM

## 2021-09-02 PROCEDURE — G0439 PPPS, SUBSEQ VISIT: HCPCS | Performed by: FAMILY MEDICINE

## 2021-09-02 PROCEDURE — 99214 OFFICE O/P EST MOD 30 MIN: CPT | Performed by: FAMILY MEDICINE

## 2021-09-02 PROCEDURE — 90662 IIV NO PRSV INCREASED AG IM: CPT

## 2021-09-02 PROCEDURE — 1123F ACP DISCUSS/DSCN MKR DOCD: CPT | Performed by: FAMILY MEDICINE

## 2021-09-02 PROCEDURE — G0008 ADMIN INFLUENZA VIRUS VAC: HCPCS

## 2021-09-02 NOTE — PATIENT INSTRUCTIONS
Medicare Preventive Visit Patient Instructions  Thank you for completing your Welcome to Medicare Visit or Medicare Annual Wellness Visit today  Your next wellness visit will be due in one year (9/3/2022)  The screening/preventive services that you may require over the next 5-10 years are detailed below  Some tests may not apply to you based off risk factors and/or age  Screening tests ordered at today's visit but not completed yet may show as past due  Also, please note that scanned in results may not display below  Preventive Screenings:  Service Recommendations Previous Testing/Comments   Colorectal Cancer Screening  * Colonoscopy    * Fecal Occult Blood Test (FOBT)/Fecal Immunochemical Test (FIT)  * Fecal DNA/Cologuard Test  * Flexible Sigmoidoscopy Age: 54-65 years old   Colonoscopy: every 10 years (may be performed more frequently if at higher risk)  OR  FOBT/FIT: every 1 year  OR  Cologuard: every 3 years  OR  Sigmoidoscopy: every 5 years  Screening may be recommended earlier than age 48 if at higher risk for colorectal cancer  Also, an individualized decision between you and your healthcare provider will decide whether screening between the ages of 74-80 would be appropriate  Colonoscopy: Not on file  FOBT/FIT: Not on file  Cologuard: Not on file  Sigmoidoscopy: Not on file          Breast Cancer Screening Age: 36 years old  Frequency: every 1-2 years  Not required if history of left and right mastectomy Mammogram: Not on file        Cervical Cancer Screening Between the ages of 21-29, pap smear recommended once every 3 years  Between the ages of 33-67, can perform pap smear with HPV co-testing every 5 years     Recommendations may differ for women with a history of total hysterectomy, cervical cancer, or abnormal pap smears in past  Pap Smear: Not on file        Hepatitis C Screening Once for adults born between [facility]  More frequently in patients at high risk for Hepatitis C Hep C Antibody: Not on file        Diabetes Screening 1-2 times per year if you're at risk for diabetes or have pre-diabetes Fasting glucose: No results in last 5 years   A1C: 5 2 % of total Hgb        Cholesterol Screening Once every 5 years if you don't have a lipid disorder  May order more often based on risk factors  Lipid panel: 04/15/2021          Other Preventive Screenings Covered by Medicare:  1  Abdominal Aortic Aneurysm (AAA) Screening: covered once if your at risk  You're considered to be at risk if you have a family history of AAA  2  Lung Cancer Screening: covers low dose CT scan once per year if you meet all of the following conditions: (1) Age 50-69; (2) No signs or symptoms of lung cancer; (3) Current smoker or have quit smoking within the last 15 years; (4) You have a tobacco smoking history of at least 30 pack years (packs per day multiplied by number of years you smoked); (5) You get a written order from a healthcare provider  3  Glaucoma Screening: covered annually if you're considered high risk: (1) You have diabetes OR (2) Family history of glaucoma OR (3)  aged 48 and older OR (3)  American aged 72 and older  3  Osteoporosis Screening: covered every 2 years if you meet one of the following conditions: (1) You're estrogen deficient and at risk for osteoporosis based off medical history and other findings; (2) Have a vertebral abnormality; (3) On glucocorticoid therapy for more than 3 months; (4) Have primary hyperparathyroidism; (5) On osteoporosis medications and need to assess response to drug therapy  · Last bone density test (DXA Scan): 03/17/2014   5  HIV Screening: covered annually if you're between the age of 15-65  Also covered annually if you are younger than 13 and older than 72 with risk factors for HIV infection  For pregnant patients, it is covered up to 3 times per pregnancy      Immunizations:  Immunization Recommendations   Influenza Vaccine Annual influenza vaccination during flu season is recommended for all persons aged >= 6 months who do not have contraindications   Pneumococcal Vaccine (Prevnar and Pneumovax)  * Prevnar = PCV13  * Pneumovax = PPSV23   Adults 25-60 years old: 1-3 doses may be recommended based on certain risk factors  Adults 72 years old: Prevnar (PCV13) vaccine recommended followed by Pneumovax (PPSV23) vaccine  If already received PPSV23 since turning 65, then PCV13 recommended at least one year after PPSV23 dose  Hepatitis B Vaccine 3 dose series if at intermediate or high risk (ex: diabetes, end stage renal disease, liver disease)   Tetanus (Td) Vaccine - COST NOT COVERED BY MEDICARE PART B Following completion of primary series, a booster dose should be given every 10 years to maintain immunity against tetanus  Td may also be given as tetanus wound prophylaxis  Tdap Vaccine - COST NOT COVERED BY MEDICARE PART B Recommended at least once for all adults  For pregnant patients, recommended with each pregnancy  Shingles Vaccine (Shingrix) - COST NOT COVERED BY MEDICARE PART B  2 shot series recommended in those aged 48 and above     Health Maintenance Due:      Topic Date Due    Breast Cancer Screening: Mammogram  Never done     Immunizations Due:      Topic Date Due    DTaP,Tdap,and Td Vaccines (1 - Tdap) Never done    Influenza Vaccine (1) 09/01/2021     Advance Directives   What are advance directives? Advance directives are legal documents that state your wishes and plans for medical care  These plans are made ahead of time in case you lose your ability to make decisions for yourself  Advance directives can apply to any medical decision, such as the treatments you want, and if you want to donate organs  What are the types of advance directives? There are many types of advance directives, and each state has rules about how to use them  You may choose a combination of any of the following:  · Living will:   This is a written record of the treatment you want  You can also choose which treatments you do not want, which to limit, and which to stop at a certain time  This includes surgery, medicine, IV fluid, and tube feedings  · Durable power of  for healthcare [facility]): This is a written record that states who you want to make healthcare choices for you when you are unable to make them for yourself  This person, called a proxy, is usually a family member or a friend  You may choose more than 1 proxy  · Do not resuscitate (DNR) order:  A DNR order is used in case your heart stops beating or you stop breathing  It is a request not to have certain forms of treatment, such as CPR  A DNR order may be included in other types of advance directives  · Medical directive: This covers the care that you want if you are in a coma, near death, or unable to make decisions for yourself  You can list the treatments you want for each condition  Treatment may include pain medicine, surgery, blood transfusions, dialysis, IV or tube feedings, and a ventilator (breathing machine)  · Values history: This document has questions about your views, beliefs, and how you feel and think about life  This information can help others choose the care that you would choose  Why are advance directives important? An advance directive helps you control your care  Although spoken wishes may be used, it is better to have your wishes written down  Spoken wishes can be misunderstood, or not followed  Treatments may be given even if you do not want them  An advance directive may make it easier for your family to make difficult choices about your care  Urinary Incontinence   Urinary incontinence (UI)  is when you lose control of your bladder  UI develops because your bladder cannot store or empty urine properly  The 3 most common types of UI are stress incontinence, urge incontinence, or both  Medicines:   · May be given to help strengthen your bladder control   Report any side effects of medication to your healthcare provider  Do pelvic muscle exercises often:  Your pelvic muscles help you stop urinating  Squeeze these muscles tight for 5 seconds, then relax for 5 seconds  Gradually work up to squeezing for 10 seconds  Do 3 sets of 15 repetitions a day, or as directed  This will help strengthen your pelvic muscles and improve bladder control  Train your bladder:  Go to the bathroom at set times, such as every 2 hours, even if you do not feel the urge to go  You can also try to hold your urine when you feel the urge to go  For example, hold your urine for 5 minutes when you feel the urge to go  As that becomes easier, hold your urine for 10 minutes  Self-care:   · Keep a UI record  Write down how often you leak urine and how much you leak  Make a note of what you were doing when you leaked urine  · Drink liquids as directed  You may need to limit the amount of liquid you drink to help control your urine leakage  Do not drink any liquid right before you go to bed  Limit or do not have drinks that contain caffeine or alcohol  · Prevent constipation  Eat a variety of high-fiber foods  Good examples are high-fiber cereals, beans, vegetables, and whole-grain breads  Walking is the best way to trigger your intestines to have a bowel movement  · Exercise regularly and maintain a healthy weight  Weight loss and exercise will decrease pressure on your bladder and help you control your leakage  · Use a catheter as directed  to help empty your bladder  A catheter is a tiny, plastic tube that is put into your bladder to drain your urine  · Go to behavior therapy as directed  Behavior therapy may be used to help you learn to control your urge to urinate  © Copyright [employer] 2018 Information is for End User's use only and may not be sold, redistributed or otherwise used for commercial purposes   All illustrations and images included in CareNotes® are the copyrighted property of A D A M , [employer]  or [address] [place] [address] SEVERE

## 2021-09-02 NOTE — PROGRESS NOTES
Chief Complaint   Patient presents with   [facility] Wellness Visit     Subsequent  Health Maintenance   Topic Date Due    DTaP,Tdap,and Td Vaccines (1 - Tdap) Never done    Breast Cancer Screening: Mammogram  Never done    Falls: Plan of Care  Never done   [facility] Annual Wellness Visit (AWV)  08/12/2020    Fall Risk  09/02/2022    BMI: Adult  09/02/2022    Pneumococcal Vaccine: 65+ Years  Completed    Influenza Vaccine  Completed    COVID-19 Vaccine  Completed    HIB Vaccine  Aged Out    Hepatitis B Vaccine  Aged Out    IPV Vaccine  Aged Out    Hepatitis A Vaccine  Aged Out    Meningococcal ACWY Vaccine  Aged Out    HPV Vaccine  Aged Out      Assessment and Plan:     Problem List Items Addressed This Visit        Endocrine    Hypothyroidism - Primary     Continue levothyroxine 88mcg daily  Relevant Orders    CBC    Comprehensive metabolic panel    Hemoglobin A1C    Lipid panel    TSH, 3rd generation with Free T4 reflex    Impaired fasting glucose     Low carb diet  Relevant Orders    CBC    Comprehensive metabolic panel    Hemoglobin A1C    Lipid panel    TSH, 3rd generation with Free T4 reflex       Cardiovascular and Mediastinum    Hypertension (Chronic)     Controlled  DASH diet  Continue lisinopril 5mg bid per cardiology  Relevant Orders    CBC    Comprehensive metabolic panel    Hemoglobin A1C    Lipid panel    TSH, 3rd generation with Free T4 reflex       Nervous and Auditory    Peripheral neuropathy, hereditary/idiopathic     Continue gabapentin per neurology  Other    Fibromyalgia     Continue dulexetine per rheumatology  Hyperlipidemia     Low fat diet  Continue zetia 10mg qhs per cardiology  Relevant Orders    CBC    Comprehensive metabolic panel    Hemoglobin A1C    Lipid panel    TSH, 3rd generation with Free T4 reflex    Headache     Continue topamax per neurology  Memory loss     MMSE today 4/5              Other Visit Diagnoses     Postmenopausal        Relevant Orders    DXA bone density spine hip and pelvis    Encounter for immunization        Relevant Orders    influenza vaccine, high-dose, PF 0 7 mL (FLUZONE HIGH-DOSE) (Completed)    Medicare annual wellness visit, subsequent              Falls Plan of Care: balance, strength, and gait training instructions were provided  Preventive health issues were discussed with patient, and age appropriate screening tests were ordered as noted in patient's After Visit Summary  Personalized health advice and appropriate referrals for health education or preventive services given if needed, as noted in patient's After Visit Summary       History of Present Illness:     Patient presents for Medicare Annual Wellness visit    Patient Care Team:  Lauren Marte MD as PCP - MD Teo Robles MD Augustus Million, MD Dawson Lemming, MD (Inactive)  MD Lio Haro, Osiris Abarca MD     Problem List:     Patient Active Problem List   Diagnosis    Lumbar stenosis with neurogenic claudication    Hypertension    Fibromyalgia    Hyperlipidemia    Migraine    S/P Lumbar laminectomy and decompressio L2-S1    Neuropathy    Lumbar radiculopathy    Abnormal chest x-ray    Back pain    Chronic osteomyelitis of left foot ([address] )    Depression with anxiety    Esophageal reflux    Generalized pain    Headache    History of DVT (deep vein thrombosis)    Hypothyroidism    Impaired fasting glucose    Insomnia    Knee osteoarthritis    Lung mass    Lyme disease    Memory loss    PAF (paroxysmal atrial fibrillation) (HCC)    Peripheral neuropathy, hereditary/idiopathic    Pleural nodules    Solitary fibrous tumor    Statin intolerance    Tinnitus    Varicose veins    Left foot drop      Past Medical and Surgical History:     Past Medical History:   Diagnosis Date    CTS (carpal tunnel syndrome) unspecified laterality    DVT (deep venous thrombosis) (HCC)     left leg, s/p IVC filter 11/2015    Endometriosis     Fibromyalgia     Hypercholesteremia     Hypertension     Hypothyroidism     Lyme disease     treated 8/2015    Migraine     PAF (paroxysmal atrial fibrillation) ([facility])     s/p ablation at [facility] (sees Dr Melina Barrow at [facility])   Ambika Camargo Peripheral neuropathy     Solitary pulmonary nodule on lung CT      Past Surgical History:   Procedure Laterality Date    ABDOMINAL HYSTERECTOMY      APPENDECTOMY      ATRIAL ABLATION SURGERY      cath    BREAST SURGERY Left     puncture aspiration of cyst onset 1972    CHOLECYSTECTOMY      onset 1981    COLONOSCOPY      fiberoptic    FOOT SURGERY Bilateral     onset 1993- removal of mortons neuroma    HAND SURGERY      gaglion cyst removal    HAND SURGERY      onset 1991 - carpal tunnel    IVC FILTER INSERTION      KNEE ARTHROSCOPY Left     therapeutic     LYMPH NODE BIOPSY      1996 onset    [place] ARTHRODESIS POSTERIOR/POSTEROLATERAL LUMBAR N/A 3/29/2016    Procedure: L2-S1 LAMINECTOMY;  Surgeon: Glen Hughes DO;  [facility];  Service: Orthopedics    TOTAL ABDOMINAL HYSTERECTOMY      onset 1989      Family History:     Family History   Problem Relation Age of Onset    Heart disease Father     ADD / ADHD Father     Stroke Father     Cancer Brother         prostate    Heart attack Maternal Grandmother         acute MI    Cancer Family       Social History:     Social History     Socioeconomic History    Marital status: /Civil Union     Spouse name: None    Number of children: None    Years of education: None    Highest education level: None   Occupational History    None   Tobacco Use    Smoking status: Never Smoker    Smokeless tobacco: Never Used   Substance and Sexual Activity    Alcohol use: No    Drug use: No    Sexual activity: None   Other Topics Concern    None   Social History Narrative    None     Social Determinants of Health     Financial Resource Strain:     Difficulty of Paying Living Expenses:    Food Insecurity:     Worried About Running Out of Food in the Last Year:     [address] in the Last Year:    Transportation Needs:     Lack of Transportation (Medical):  Lack of Transportation (Non-Medical):    Physical Activity:     Days of Exercise per Week:     Minutes of Exercise per Session:    Stress:     Feeling of Stress :    Social Connections:     Frequency of Communication with Friends and Family:     Frequency of Social Gatherings with Friends and Family:     Attends Mandaeism Services:     Active Member of Clubs or Organizations:     Attends Club or Organization Meetings:     Marital Status:    Intimate Partner Violence:     Fear of Current or Ex-Partner:     Emotionally Abused:     Physically Abused:     Sexually Abused:       Medications and Allergies:     Current Outpatient Medications   Medication Sig Dispense Refill    aspirin (ECOTRIN LOW STRENGTH) 81 mg EC tablet Take 81 mg by mouth daily      chlorhexidine (PERIDEX) 0 12 % solution RINSE MOUTH WITH 15 ML (1 CAPFUL) FOR 30 SECONDS IN MORNING AND E   (REFER TO PRESCRIPTION NOTES)  0    Cholecalciferol (VITAMIN D3) 5000 units CAPS Take 5,000 Units by mouth daily       DULoxetine (CYMBALTA) 30 mg delayed release capsule Take 3 capsules (90 mg dose) daily  (Patient taking differently: 30 mg daily Take 3 capsules (90 mg dose) daily  ) 30 capsule 0    DULoxetine (CYMBALTA) 60 mg delayed release capsule Take 60 mg by mouth daily      ezetimibe (ZETIA) 10 mg tablet Take 10 mg by mouth daily   gabapentin (NEURONTIN) 300 mg capsule TAKE 1 CAPSULE BY MOUTH 3  TIMES DAILY 270 capsule 3    levothyroxine 88 mcg tablet TAKE 1 TABLET BY MOUTH  DAILY 90 tablet 3    lisinopril (ZESTRIL) 5 mg tablet Take 5 mg by mouth 2 (two) times a day  0    Menthol 5 4 MG LOZG Take 1 lozenge every 2 hours as needed    0    topiramate (TOPAMAX) 50 MG tablet Take 1 tablet (50 mg total) by mouth daily 90 tablet 1     No current facility-administered medications for this visit  Allergies   Allergen Reactions    Betaine     Cranberry Extract - Food Allergy     Cranberry Juice Powder - Food Allergy     Latex      Other reaction(s): Rash and itching    Soy Isoflavones      Other reaction(s): Itching    Soybean-Containing Drug Products - Food Allergy     Voltaren [Diclofenac Sodium]       Immunizations:     Immunization History   Administered Date(s) Administered    Influenza Split High Dose Preservative Free IM 10/14/2015, 10/21/2016, 10/30/2017    Influenza, high dose seasonal 0 7 mL 11/06/2018, 01/18/2021, 09/02/2021    Influenza, seasonal, injectable 11/20/2012    Pneumococcal Conjugate 13-Valent 10/14/2015    Pneumococcal Polysaccharide PPV23 01/01/2007, 04/27/2017    SARS-CoV-2 / COVID-19 mRNA IM ([employer]) 02/23/2021, 03/17/2021      Health Maintenance:         Topic Date Due    Breast Cancer Screening: Mammogram  Never done         Topic Date Due    DTaP,Tdap,and Td Vaccines (1 - Tdap) Never done      Medicare Health Risk Assessment:     /80   Pulse 62   Ht 5' 4" (1 626 m)   Wt 61 6 kg (135 lb 12 8 oz)   SpO2 96%   BMI 23 31 kg/m²      Dejan Elizabeth is here for her Subsequent Wellness visit  Health Risk Assessment:   Patient rates overall health as good  Patient feels that their physical health rating is same  Eyesight was rated as same  Hearing was rated as same  Patient feels that their emotional and mental health rating is slightly worse  Patients states they are never, rarely angry  Patient states they are often unusually tired/fatigued  Pain experienced in the last 7 days has been some  Patient's pain rating has been 4/10  In general, how satisfied are you with your life? Could be better   Hearing: Right at moment don't have my hearing aids  That will change and they be the same    Emotional/mental health: As Az Ortiz passed in July  Little interest or pleasure in doing things: I function slowly  If that is any indication where I'm at  Feeling down, depressed, or hopeless: But I plod onward  I have dogs + cats that demands my care, thank goodness  Weight: In the past 6 months, have you lost or gained 10 pounds without trying? I don't know  Pain: In the past 7 days, how much pain have you experienced? Have fibro + low back condition I can't remember name of  Injured during fall? Injured during fall? Minimally    Depression Screening:   PHQ-2 Score: 6  PHQ-9 Score: 13      Fall Risk Screening: In the past year, patient has experienced: history of falling in past year    Number of falls: 2 or more  Feels unsteady when standing or walking?: Yes    Worried about falling?: Yes      Urinary Incontinence Screening:   Patient has leaked urine accidently in the last six months  At night    Home Safety:  Patient has trouble with stairs inside or outside of their home  Patient has working smoke alarms and has no working carbon monoxide detector  Home safety hazards include: household clutter  Does your home have working smoke alarms? Unsure if batteries working  Does your home have working smoke alarms? Poor lighting: some would say in some places    Nutrition:   Current diet is Limited junk food  Try to eat healthy  Fruit  Some coffee w/ cinnamon sweet leaf sweetener  A lot of hummus      Medications:   Patient is currently taking over-the-counter supplements  OTC medications include: see medication list: Do take some supplements    Patient is able to manage medications  Activities of Daily Living (ADLs)/Instrumental Activities of Daily Living (IADLs):   Walk and transfer into and out of bed and chair?: Yes  Dress and groom yourself?: Yes    Bathe or shower yourself?: Yes    Feed yourself?  Yes  Do your laundry/housekeeping?: Yes  Manage your money, pay your bills and track your expenses?: Yes  Make your own meals?: Yes    Do your own shopping?: No    Previous Hospitalizations:   Any hospitalizations or ED visits within the last 12 months?: No      Advance Care Planning:   Living will: Yes    Durable POA for healthcare: Yes    Advanced directive: Yes      Cognitive Screening:   Provider or family/friend/caregiver concerned regarding cognition?: No    PREVENTIVE SCREENINGS      Cardiovascular Screening:    General: History Lipid Disorder and Screening Current      Diabetes Screening:     General: Screening Current      Colorectal Cancer Screening:     General: Screening Not Indicated      Breast Cancer Screening:     General: Screening Not Indicated      Cervical Cancer Screening:    General: Screening Not Indicated      Osteoporosis Screening:    General: Risks and Benefits Discussed    Due for: DXA Axial      Lung Cancer Screening:     General: Screening Not Indicated    Screening, Brief Intervention, and Referral to Treatment (SBIRT)    Screening  Typical number of drinks in a day: 0  Typical number of drinks in a week: 0  Interpretation: Low risk drinking behavior      Single Item Drug Screening:  How often have you used an illegal drug (including marijuana) or a prescription medication for non-medical reasons in the past year? never    Single Item Drug Screen Score: 0  Interpretation: Negative screen for possible drug use disorder      Farheen Niño MD

## 2021-09-02 NOTE — PROGRESS NOTES
Assessment/Plan:    Hypothyroidism  Continue levothyroxine 88mcg daily  Impaired fasting glucose  Low carb diet  Hypertension  Controlled  DASH diet  Continue lisinopril 5mg bid per cardiology  Hyperlipidemia  Low fat diet  Continue zetia 10mg qhs per cardiology  Fibromyalgia  Continue dulexetine per rheumatology  Depression with anxiety  Mood is Ok  Continue duloxetine  Memory loss  MMSE today 4/5  Peripheral neuropathy, hereditary/idiopathic  Continue gabapentin per neurology  Headache  Continue topamax per neurology  Got flu shot yearly  Give flu shot today  Got Covid19 shots  Denies SE  Got Prevnar 10/2015  Got Pneumovax 2017    3/2014 Dexa normal  Give script today  Fall precautions  Pt refused PT   RTO in 6 months with labs         Diagnoses and all orders for this visit:    Hypothyroidism, unspecified type  -     CBC; Future  -     Comprehensive metabolic panel; Future  -     Hemoglobin A1C; Future  -     Lipid panel; Future  -     TSH, 3rd generation with Free T4 reflex; Future    Impaired fasting glucose  -     CBC; Future  -     Comprehensive metabolic panel; Future  -     Hemoglobin A1C; Future  -     Lipid panel; Future  -     TSH, 3rd generation with Free T4 reflex; Future    Essential hypertension  -     CBC; Future  -     Comprehensive metabolic panel; Future  -     Hemoglobin A1C; Future  -     Lipid panel; Future  -     TSH, 3rd generation with Free T4 reflex; Future    Hyperlipidemia, unspecified hyperlipidemia type  -     CBC; Future  -     Comprehensive metabolic panel; Future  -     Hemoglobin A1C; Future  -     Lipid panel; Future  -     TSH, 3rd generation with Free T4 reflex; Future    Fibromyalgia    Memory loss    Other headache syndrome    Peripheral neuropathy, hereditary/idiopathic    Postmenopausal  -     DXA bone density spine hip and pelvis;  Future    Encounter for immunization  -     influenza vaccine, high-dose, PF 0 7 mL (FLUZONE HIGH-DOSE)    Medicare annual wellness visit, subsequent          Subjective:      Patient ID: Niranjan Suarez is a 78 y o  female  HPI    Pt is here by herself     Hypothyroidism---On levothyroxine 88 mcg daily  5/2021 TSH normal       IFG---Tried to eat healthy       HTN---She is on lisinopril 5mg bid per cardiology  BP at home 140/70 per pt  Pt denies headache, vision change, SOB or CP  FU cardiology Dr Darwin Frias for Afib s/p ablation 2006 yearly  Denies chest pain, palpitation etc  She is on ASA 81mg daily  5/2021 CMP ok     Hyperlipidemia---she is on zetia 10mg daily per cardiology  Follows low fat diet  4/2021 Lipid 218/106/66/131 elevated       VitD deficiency---She is on vitD 2000IU daily  5/2021 Vit D 62 good       Fibromyalgia/lyme disease----Feels tired always  Always shoulders/back pain  FU rheumatology Dr Francis Foster for fibromyalgia/lyme disease  On duloxetine 30mg am and 60mg pm       FU neurology for headache/neuropathy  On gabapentin 300mg qhs which helped some  She is on topamax 50mg daily which helped a lot        FU orthopedics for lumbar radiculopathy and lumbar spinal stenosis s/p L2-S2 laminectomy in 2016  Has balance problems  She uses cane/walker       FU opthalmology yearly  Has hearing aid now        passed away in 7/2021  Son Lainey Sinha lives close to her  Does all ADL's  Still drive  Pernell Young in 7/2021  Refused PT  Denies depression  The following portions of the patient's history were reviewed and updated as appropriate: allergies, current medications, past family history, past medical history, past social history, past surgical history and problem list     Review of Systems   Constitutional: Negative for appetite change, chills and fever  HENT: Negative for congestion, ear pain, sinus pain and sore throat  Eyes: Negative for discharge and itching  Respiratory: Negative for apnea, cough, chest tightness, shortness of breath and wheezing      Cardiovascular: Negative for chest pain, palpitations and leg swelling  Gastrointestinal: Negative for abdominal pain, anal bleeding, constipation, diarrhea, nausea and vomiting  Endocrine: Negative for cold intolerance, heat intolerance and polyuria  Genitourinary: Negative for difficulty urinating and dysuria  Musculoskeletal: Positive for arthralgias and myalgias  Negative for back pain  Skin: Negative for rash  Neurological: Negative for dizziness and headaches  Psychiatric/Behavioral: Negative for agitation  Objective:      /80   Pulse 62   Ht 5' 4" (1 626 m)   Wt 61 6 kg (135 lb 12 8 oz)   SpO2 96%   BMI 23 31 kg/m²          Physical Exam  Constitutional:       General: She is not in acute distress  Appearance: She is well-developed  HENT:      Head: Normocephalic  Eyes:      General:         Right eye: No discharge  Left eye: No discharge  Conjunctiva/sclera: Conjunctivae normal    Neck:      Thyroid: No thyromegaly  Cardiovascular:      Rate and Rhythm: Normal rate and regular rhythm  Heart sounds: Normal heart sounds  No murmur heard  No friction rub  No gallop  Pulmonary:      Effort: Pulmonary effort is normal  No respiratory distress  Breath sounds: Normal breath sounds  No wheezing or rales  Chest:      Chest wall: No tenderness  Abdominal:      General: Bowel sounds are normal  There is no distension  Palpations: Abdomen is soft  There is no mass  Tenderness: There is no abdominal tenderness  There is no guarding or rebound  Musculoskeletal:      Cervical back: Normal range of motion  Right lower leg: No edema  Left lower leg: No edema  Comments: Use walker   Lymphadenopathy:      Cervical: No cervical adenopathy  Neurological:      Mental Status: She is alert

## 2021-09-23 PROBLEM — G89.4 CHRONIC PAIN SYNDROME: Status: ACTIVE | Noted: 2021-09-23

## 2021-09-23 PROBLEM — I73.00 RAYNAUD'S DISEASE WITHOUT GANGRENE: Status: ACTIVE | Noted: 2021-09-23

## 2021-09-23 PROBLEM — M35.00 SJOGREN'S SYNDROME WITHOUT EXTRAGLANDULAR INVOLVEMENT (HCC): Status: ACTIVE | Noted: 2021-09-23

## 2021-09-23 PROBLEM — M48.07 SPINAL STENOSIS OF LUMBOSACRAL REGION: Status: ACTIVE | Noted: 2021-09-23

## 2021-09-24 ENCOUNTER — TELEPHONE (OUTPATIENT)
Dept: NEUROLOGY | Facility: CLINIC | Age: 81
End: 2021-09-24

## 2021-09-24 NOTE — TELEPHONE ENCOUNTER
I tried calling patient to remind her of her appt with Dr Ashia Nails on 10/4/21 @ 9 am  The first home and mobile numbers mailboxes are full and the other number is invalid

## 2021-09-27 NOTE — TELEPHONE ENCOUNTER
Called and spoke to patient  Patient confirmed upcoming apt with Dr Modesta Herman 10/4/21 @ 9am    Patient has no issues or concerns at this time

## 2021-10-04 ENCOUNTER — OFFICE VISIT (OUTPATIENT)
Dept: NEUROLOGY | Facility: CLINIC | Age: 81
End: 2021-10-04
Payer: MEDICARE

## 2021-10-04 VITALS
DIASTOLIC BLOOD PRESSURE: 78 MMHG | RESPIRATION RATE: 16 BRPM | HEIGHT: 67 IN | SYSTOLIC BLOOD PRESSURE: 160 MMHG | WEIGHT: 136.1 LBS | TEMPERATURE: 97 F | HEART RATE: 66 BPM | BODY MASS INDEX: 21.36 KG/M2

## 2021-10-04 DIAGNOSIS — G60.9 HEREDITARY AND IDIOPATHIC NEUROPATHY, UNSPECIFIED: ICD-10-CM

## 2021-10-04 DIAGNOSIS — M54.16 LUMBAR RADICULOPATHY: ICD-10-CM

## 2021-10-04 DIAGNOSIS — G62.9 NEUROPATHY: Primary | ICD-10-CM

## 2021-10-04 PROCEDURE — 99213 OFFICE O/P EST LOW 20 MIN: CPT | Performed by: PSYCHIATRY & NEUROLOGY

## 2021-10-04 RX ORDER — MULTIVITAMIN
1 TABLET ORAL DAILY
COMMUNITY

## 2021-11-24 DIAGNOSIS — G43.909 MIGRAINE: ICD-10-CM

## 2021-11-26 RX ORDER — TOPIRAMATE 50 MG/1
TABLET, FILM COATED ORAL
Qty: 90 TABLET | Refills: 3 | Status: SHIPPED | OUTPATIENT
Start: 2021-11-26

## 2022-02-25 ENCOUNTER — TELEPHONE (OUTPATIENT)
Dept: NEUROLOGY | Facility: CLINIC | Age: 82
End: 2022-02-25

## 2022-02-25 NOTE — TELEPHONE ENCOUNTER
I called and left a voicemail message about patients upcoming appointment with Dr Katiana Taylor on 03/07/22 @ 1 pm  I requested a call back to our office to confirm the appointment or if the patient has any issues or concerns or cannot keep this appointment

## 2022-03-01 LAB
CHOLEST SERPL-MCNC: 227 MG/DL
CHOLEST/HDLC SERPL: 3 (CALC)
HBA1C MFR BLD: 5.4 % OF TOTAL HGB
HDLC SERPL-MCNC: 76 MG/DL
LDLC SERPL CALC-MCNC: 134 MG/DL (CALC)
NONHDLC SERPL-MCNC: 151 MG/DL (CALC)
TRIGL SERPL-MCNC: 75 MG/DL
TSH SERPL-ACNC: 2.1 MIU/L (ref 0.4–4.5)

## 2022-03-02 ENCOUNTER — OFFICE VISIT (OUTPATIENT)
Dept: FAMILY MEDICINE CLINIC | Facility: CLINIC | Age: 82
End: 2022-03-02
Payer: MEDICARE

## 2022-03-02 VITALS
TEMPERATURE: 98.8 F | SYSTOLIC BLOOD PRESSURE: 128 MMHG | RESPIRATION RATE: 16 BRPM | WEIGHT: 137 LBS | BODY MASS INDEX: 21.5 KG/M2 | HEIGHT: 67 IN | HEART RATE: 64 BPM | DIASTOLIC BLOOD PRESSURE: 70 MMHG

## 2022-03-02 DIAGNOSIS — E78.5 HYPERLIPIDEMIA, UNSPECIFIED HYPERLIPIDEMIA TYPE: ICD-10-CM

## 2022-03-02 DIAGNOSIS — R20.9 COLD FEET: ICD-10-CM

## 2022-03-02 DIAGNOSIS — R23.8 CHANGE OF SKIN COLOR: Primary | ICD-10-CM

## 2022-03-02 DIAGNOSIS — R73.01 IMPAIRED FASTING GLUCOSE: ICD-10-CM

## 2022-03-02 DIAGNOSIS — I10 PRIMARY HYPERTENSION: Chronic | ICD-10-CM

## 2022-03-02 DIAGNOSIS — E03.9 HYPOTHYROIDISM, UNSPECIFIED TYPE: ICD-10-CM

## 2022-03-02 PROCEDURE — 99214 OFFICE O/P EST MOD 30 MIN: CPT | Performed by: FAMILY MEDICINE

## 2022-03-02 NOTE — TELEPHONE ENCOUNTER
Called and spoke to patient  Patient confirmed upcoming apt with Dr Bethany Hernandez on 3/7/22 @ 1 pm   Patient has no issues or concerns at this time

## 2022-03-02 NOTE — PROGRESS NOTES
Chief Complaint   Patient presents with    Follow-up     6 month follow up      Health Maintenance   Topic Date Due    DTaP,Tdap,and Td Vaccines (1 - Tdap) Never done    Breast Cancer Screening: Mammogram  Never done    COVID-19 Vaccine (3 - Booster for Jamie Patricia series) 08/17/2021    Fall Risk  09/02/2022    Medicare Annual Wellness Visit (AWV)  09/02/2022    Falls: Plan of Care  09/02/2022    BMI: Adult  03/02/2023    Osteoporosis Screening  Completed    Pneumococcal Vaccine: 65+ Years  Completed    Influenza Vaccine  Completed    HIB Vaccine  Aged Out    Hepatitis B Vaccine  Aged Out    IPV Vaccine  Aged Out    Hepatitis A Vaccine  Aged Out    Meningococcal ACWY Vaccine  Aged Out    HPV Vaccine  Aged Out       Assessment/Plan:    Hypothyroidism  3/2022 TSH normal  Continue levothyroxine 88mcg daily  Impaired fasting glucose  3/2022 hgA1C 5 4 normal  Low carb diet  Hyperlipidemia  3/2022 Lipid 227/75/76/134 elevated  Low fat diet  Continue zetia per cardiology  Hypertension  Controlled  DASH diet  Continue lisinopril per cardiology  Reviewed lab in 3/2022  Vit B12 653 normal  HgA1C 5 4 normal  Lipid 227/75/76/134 elevated  TSH normal    Got flu shot yearly  Got Covid19 shots  Did not get booster yet  Got Prevnar 10/2015  Got Pneumovax 2017  Fall precautions  Pt refused PT  Recommend pt to get Dexa done  RTO in 6 months with labs         Diagnoses and all orders for this visit:    Change of skin color  -     VAS lower limb arterial duplex, complete bilateral; Future    Cold feet  -     VAS lower limb arterial duplex, complete bilateral; Future    Hypothyroidism, unspecified type  -     CBC; Future  -     Comprehensive metabolic panel; Future  -     Hemoglobin A1C; Future  -     Lipid panel; Future  -     TSH, 3rd generation with Free T4 reflex; Future    Impaired fasting glucose  -     CBC; Future  -     Comprehensive metabolic panel;  Future  -     Hemoglobin A1C; Future  - Lipid panel; Future  -     TSH, 3rd generation with Free T4 reflex; Future    Primary hypertension  -     CBC; Future  -     Comprehensive metabolic panel; Future  -     Hemoglobin A1C; Future  -     Lipid panel; Future  -     TSH, 3rd generation with Free T4 reflex; Future    Hyperlipidemia, unspecified hyperlipidemia type  -     CBC; Future  -     Comprehensive metabolic panel; Future  -     Hemoglobin A1C; Future  -     Lipid panel; Future  -     TSH, 3rd generation with Free T4 reflex; Future          Subjective:      Patient ID: Alex Braswell is a 78 y o  female  HPI    Pt is here by herself     C/o feet cold and color change for 2 months  Left foot is worse  Her toes are purple  Denies pain or numbness/tingling  Hypothyroidism---She is on levothyroxine 88 mcg daily       IFG---Tried to eat healthy       HTN---She is on lisinopril 5mg bid per cardiology  Pt denies headache, vision change, SOB or CP  FU cardiology Dr Andrea Guerrero for Afib s/p ablation 2006 yearly  Denies chest pain, palpitation etc  She is on ASA 81mg daily       Hyperlipidemia---She refused to take statin because of fibromyalgia  She is on zetia 10mg daily per cardiology  Follows low fat diet       VitD deficiency---She is on vitD 2000IU daily  5/2021 Vit D 62 good       Fibromyalgia/lyme disease----Feels tired always  Always shoulders/back pain  FU rheumatology Dr Supa Lynch for fibromyalgia/lyme disease  On duloxetine 60mg pm       FU neurology for headache/neuropathy  She stopped gabapentin because of intolerance per pt  She is on topamax 50mg daily which helped a lot        [facility] orthopedics for lumbar radiculopathy and lumbar spinal stenosis s/p L2-S2 laminectomy in 2016  Has balance problems  She uses cane/walker        passed away in 7/2021  Son Jhonatan Maya lives close to her  Does all ADL's  Does not drive any more  Berneice Jose recently  She uses walkers  Sometimes depression  Stress in life         The following portions of the patient's history were reviewed and updated as appropriate: allergies, current medications, past family history, past medical history, past social history, past surgical history and problem list     Review of Systems   Constitutional: Negative for appetite change, chills and fever  HENT: Negative for congestion, ear pain, sinus pain and sore throat  Eyes: Negative for discharge and itching  Respiratory: Negative for apnea, cough, chest tightness, shortness of breath and wheezing  Cardiovascular: Negative for chest pain, palpitations and leg swelling  Gastrointestinal: Negative for abdominal pain, anal bleeding, constipation, diarrhea, nausea and vomiting  Endocrine: Negative for cold intolerance, heat intolerance and polyuria  Genitourinary: Negative for difficulty urinating and dysuria  Musculoskeletal: Negative for arthralgias, back pain and myalgias  Skin: Positive for color change  Negative for rash  Neurological: Negative for dizziness and headaches  Psychiatric/Behavioral: Negative for agitation  Objective:      /70   Pulse 64   Temp 98 8 °F (37 1 °C) (Tympanic)   Resp 16   Ht 5' 6 5" (1 689 m)   Wt 62 1 kg (137 lb)   BMI 21 78 kg/m²          Physical Exam  Constitutional:       General: She is not in acute distress  Appearance: She is well-developed  HENT:      Head: Normocephalic  Eyes:      General:         Right eye: No discharge  Left eye: No discharge  Conjunctiva/sclera: Conjunctivae normal    Neck:      Thyroid: No thyromegaly  Cardiovascular:      Rate and Rhythm: Normal rate and regular rhythm  Heart sounds: Normal heart sounds  No murmur heard  No friction rub  No gallop  Pulmonary:      Effort: Pulmonary effort is normal  No respiratory distress  Breath sounds: Normal breath sounds  No wheezing or rales  Chest:      Chest wall: No tenderness  Abdominal:      General: Bowel sounds are normal  There is no distension  Palpations: Abdomen is soft  There is no mass  Tenderness: There is no abdominal tenderness  There is no guarding or rebound  Musculoskeletal:         General: No tenderness or deformity  Cervical back: Normal range of motion  Right lower leg: No edema  Left lower leg: No edema  Comments: Use walker  B/L feet purple toes  Good pulse   Lymphadenopathy:      Cervical: No cervical adenopathy  Neurological:      Mental Status: She is alert

## 2022-03-04 LAB
FOLATE SERPL-MCNC: 15.9 NG/ML
VIT B12 SERPL-MCNC: 653 PG/ML (ref 200–1100)
VIT B6 SERPL-MCNC: 64.1 NG/ML (ref 2.1–21.7)

## 2022-03-07 ENCOUNTER — OFFICE VISIT (OUTPATIENT)
Dept: NEUROLOGY | Facility: CLINIC | Age: 82
End: 2022-03-07
Payer: MEDICARE

## 2022-03-07 VITALS
DIASTOLIC BLOOD PRESSURE: 68 MMHG | TEMPERATURE: 97.7 F | HEIGHT: 67 IN | OXYGEN SATURATION: 98 % | WEIGHT: 133 LBS | HEART RATE: 95 BPM | RESPIRATION RATE: 16 BRPM | SYSTOLIC BLOOD PRESSURE: 132 MMHG | BODY MASS INDEX: 20.88 KG/M2

## 2022-03-07 DIAGNOSIS — G60.9 PERIPHERAL NEUROPATHY, HEREDITARY/IDIOPATHIC: ICD-10-CM

## 2022-03-07 DIAGNOSIS — M54.16 LUMBAR RADICULOPATHY: Primary | ICD-10-CM

## 2022-03-07 PROCEDURE — 99213 OFFICE O/P EST LOW 20 MIN: CPT | Performed by: PSYCHIATRY & NEUROLOGY

## 2022-03-07 NOTE — ASSESSMENT & PLAN NOTE
She has left footdrop  She does utilize an Afo  brace  She is currently does not exercise on a regular basis

## 2022-03-07 NOTE — ASSESSMENT & PLAN NOTE
She does have a history of an unclear etiology of a peripheral neuropathy  She is no longer on gabapentin  Her B6 level was high and I have asked her to reduce the multivitamin to every other day  Discussed repeat levels  Repeat labs  cannot be performed were least nine months  After she returns to the next visit this can be we ordered  She is on Cymbalta 60 mg a day and currently off of gabapentin    She denies minimal neuropathic pain

## 2022-03-07 NOTE — PROGRESS NOTES
Patient ID: Alex Braswell is a 78 y o  female  Assessment/Plan:    Lumbar radiculopathy  She has left footdrop  She does utilize an Afo  brace  She is currently does not exercise on a regular basis  Peripheral neuropathy, hereditary/idiopathic  She does have a history of an unclear etiology of a peripheral neuropathy  She is no longer on gabapentin  Her B6 level was high and I have asked her to reduce the multivitamin to every other day  Discussed repeat levels  Repeat labs  cannot be performed were least nine months  After she returns to the next visit this can be we ordered  She is on Cymbalta 60 mg a day and currently off of gabapentin  She denies minimal neuropathic pain       Diagnoses and all orders for this visit:    Lumbar radiculopathy    Peripheral neuropathy, hereditary/idiopathic         Subjective:    Fanta Segal is a 79 yo woman with a history of multiple medical problems including fibromyalgia, HTN, hyperlipidemia, and lumbar spinal degenerative disease and mild peripheral neuropathy who is returning to Neurology clinic for follow up  She was last evaluated in October 2021  At that point she did undergo laboratory studies recently  She does have a left foot drop stemming prior radiculopathy and use utilize does utilize an Afo brace  She has a known left footdrop  She did undergo EMG of the lower extremities which showed a polyneuropathy and a left l4/l5 radiculopathy in June of 2015    she did undergo a l2/S1 laminectomy and decompression in march 2016   She continues to have a gait dysfunction is specially left footdrop         She reports  her  did pass away recently  and her son does help her with her appointment as she does not drive        The left leg symptoms are better after the surgery but still have not returned to normal  she can uses canes while home but uses a walker when outside   She attributes radiculopathy due to lyme diease     sHe no longer utilizes gabapentin as it did result in confusion        Although the gabapentin is scheduled for is ordered for t i d  she only takes it at night as it can cause drowsiness        She is on Cymbalta 60  mg a day which was reduced from 90 mg a day due to side effects    She has been out of Cymbalta for approximately one week and it  was sent in to the pharmacy today  She describes bagging in her head and more difficulty with cognition since she ran out of Cymbalta     Overall her migraines are well controlled on Topamax 50 mg a day  Recent laboratory studies showed a B12 that was normal and a B6 that was elevated at 64  1           recent cmp normal except for protein of 5 8      B12, TSH folate CMP were all normal           She has hearing aids but has poor hearing despite the hearing aids  This did limit our conversation                               The following portions of the patient's history were reviewed and updated as appropriate:   She  has a past medical history of CTS (carpal tunnel syndrome), DVT (deep venous thrombosis) ([address] ), Endometriosis, Fibromyalgia, Fibromyalgia, primary, Foot drop, left foot, Hypercholesteremia, Hypertension, Hypothyroidism, Lyme disease, Migraine, Migraine, Neurogenic claudication due to lumbar spinal stenosis, Osteoarthritis, PAF (paroxysmal atrial fibrillation) ([address] ), Peripheral neuropathy, Raynaud's syndrome without gangrene, Sjogren's syndrome ([address] ), and Solitary pulmonary nodule on lung CT  She  has a past surgical history that includes Appendectomy; Breast surgery (Left); Cholecystectomy; Abdominal hysterectomy; IVC FILTER INSERTION; pr arthrodesis posterior/posterolateral lumbar (N/A, 3/29/2016); Lymph node biopsy; Atrial ablation surgery; Colonoscopy; Foot surgery (Bilateral); Hand surgery; Hand surgery; Knee arthroscopy (Left); and Total abdominal hysterectomy  Her family history includes ADD / ADHD in her father; Cancer in her brother and family;  Heart attack in her maternal grandmother; Heart disease in her father; Stroke in her father  She  reports that she has never smoked  She has never used smokeless tobacco  She reports that she does not drink alcohol and does not use drugs  Current Outpatient Medications   Medication Sig Dispense Refill    aspirin (ECOTRIN LOW STRENGTH) 81 mg EC tablet Take 81 mg by mouth daily      chlorhexidine (PERIDEX) 0 12 % solution RINSE MOUTH WITH 15 ML (1 CAPFUL) FOR 30 SECONDS IN MORNING AND E   (REFER TO PRESCRIPTION NOTES)  0    Cholecalciferol (VITAMIN D3) 5000 units CAPS Take 1,000 Units by mouth daily       DULoxetine (CYMBALTA) 60 mg delayed release capsule Take 1 capsule (60 mg total) by mouth daily 90 capsule 1    ezetimibe (ZETIA) 10 mg tablet Take 10 mg by mouth daily   levothyroxine 88 mcg tablet TAKE 1 TABLET BY MOUTH  DAILY 90 tablet 3    lisinopril (ZESTRIL) 5 mg tablet Take 5 mg by mouth 2 (two) times a day  0    Menthol 5 4 MG LOZG Take 1 lozenge every 2 hours as needed  0    Multiple Vitamin (multivitamin) tablet Take 1 tablet by mouth daily      topiramate (TOPAMAX) 50 MG tablet TAKE 1 TABLET BY MOUTH  DAILY 90 tablet 3    DULoxetine (CYMBALTA) 30 mg delayed release capsule Take 3 capsules (90 mg dose) daily  (Patient not taking: Reported on 3/7/2022 ) 30 capsule 0    gabapentin (NEURONTIN) 300 mg capsule TAKE 1 CAPSULE BY MOUTH 3  TIMES DAILY (Patient not taking: Reported on 3/2/2022 ) 270 capsule 3     No current facility-administered medications for this visit  She is allergic to betaine, cranberry extract - food allergy, cranberry juice powder - food allergy, latex, soy isoflavones, soybean-containing drug products - food allergy, and voltaren [diclofenac sodium]            Objective:    Blood pressure 132/68, pulse 95, temperature 97 7 °F (36 5 °C), temperature source Tympanic, resp  rate 16, height 5' 6 5" (1 689 m), weight 60 3 kg (133 lb), SpO2 98 %      Physical Exam  Eyes:      General: Lids are normal       Extraocular Movements: Extraocular movements intact  Pupils: Pupils are equal, round, and reactive to light  Neurological:      Deep Tendon Reflexes:      Reflex Scores:       Tricep reflexes are 1+ on the right side and 1+ on the left side  Bicep reflexes are 2+ on the right side and 2+ on the left side  Patellar reflexes are 2+ on the right side and 2+ on the left side  Achilles reflexes are 1+ on the right side and 1+ on the left side  Neurological Exam  Mental Status  Awake, alert and oriented to person, place and time  Oriented to person, place and time  Language is fluent with no aphasia  Cranial Nerves  CN II: Visual acuity is normal  Visual fields full to confrontation  CN III, IV, VI: Extraocular movements intact bilaterally  Normal lids and orbits bilaterally  Pupils equal round and reactive to light bilaterally  CN V: Facial sensation is normal   CN VII: Full and symmetric facial movement  CN VIII: Hearing is normal   CN IX, X: Palate elevates symmetrically  Normal gag reflex  CN XI: Shoulder shrug strength is normal   CN XII: Tongue midline without atrophy or fasciculations  Motor    Left foot drop 4/5 She has a left foot drop   dorsiflexion was a 1/5 plantar flexion was a 3/5  She had hip flexion at a four /5  Bethany Rm Sensory  Vibratory sensation was 4 seconds at the ankles       Reflexes                                           Right                      Left  Biceps                                 2+                         2+  Triceps                                1+                         1+  Patellar                                2+                         2+  Achilles                                1+                         1+  Plantar                           Downgoing                Downgoing    Right pathological reflexes: Gilmar's absent  Left pathological reflexes: Gilmar's absent      Coordination  Right: Finger-to-nose normal   Left: Finger-to-nose normal     Gait Unable to rise from chair without using arms  She walks with a walker  She had difficulty standing from sitting position  She did have a left foot drop and has a brace in place  Review of systems obtained from the medical assistant as below was reviewed with the patient at today's visit  ROS:    Review of Systems   Constitutional: Positive for fatigue  Negative for appetite change and fever  HENT: Positive for hearing loss  Negative for tinnitus, trouble swallowing and voice change  Eyes: Negative  Negative for photophobia and pain  Respiratory: Negative for shortness of breath  Heavy breathing at times   Cardiovascular: Negative  Negative for palpitations  Gastrointestinal: Negative  Negative for nausea and vomiting  Endocrine: Negative  Negative for cold intolerance  Genitourinary: Negative  Negative for dysuria, frequency and urgency  Musculoskeletal: Positive for gait problem (walks with a walker - balance issues), neck pain and neck stiffness  Negative for myalgias  Skin: Negative  Negative for rash  Allergic/Immunologic: Negative  Neurological: Negative for dizziness, tremors, seizures, syncope, facial asymmetry, speech difficulty, weakness, light-headedness, numbness and headaches  Banging in the head since she ran out of duloxetine   Hematological: Bruises/bleeds easily  Psychiatric/Behavioral: Negative for confusion, hallucinations and sleep disturbance  Memory issues     She is to return to our offices in six months

## 2022-05-03 ENCOUNTER — HOSPITAL ENCOUNTER (OUTPATIENT)
Dept: RADIOLOGY | Facility: HOSPITAL | Age: 82
Discharge: HOME | End: 2022-05-03
Payer: MEDICARE

## 2022-05-03 DIAGNOSIS — M17.0 PRIMARY OSTEOARTHRITIS OF BOTH KNEES: ICD-10-CM

## 2022-05-03 PROCEDURE — 73562 X-RAY EXAM OF KNEE 3: CPT

## 2022-05-16 ENCOUNTER — HOSPITAL ENCOUNTER (OUTPATIENT)
Dept: RADIOLOGY | Facility: HOSPITAL | Age: 82
Discharge: HOME | End: 2022-05-16
Payer: MEDICARE

## 2022-05-16 DIAGNOSIS — R20.9 COLD FEET: ICD-10-CM

## 2022-05-16 DIAGNOSIS — R23.8 CHANGE OF SKIN COLOR: ICD-10-CM

## 2022-05-16 DIAGNOSIS — Z78.0 POSTMENOPAUSAL: ICD-10-CM

## 2022-05-16 PROCEDURE — 93925 LOWER EXTREMITY STUDY: CPT | Performed by: SURGERY

## 2022-05-16 PROCEDURE — 93923 UPR/LXTR ART STDY 3+ LVLS: CPT

## 2022-05-16 PROCEDURE — 93922 UPR/L XTREMITY ART 2 LEVELS: CPT | Performed by: SURGERY

## 2022-05-16 PROCEDURE — 93925 LOWER EXTREMITY STUDY: CPT

## 2022-05-16 PROCEDURE — 77080 DXA BONE DENSITY AXIAL: CPT

## 2022-05-25 ENCOUNTER — APPOINTMENT (EMERGENCY)
Dept: RADIOLOGY | Facility: HOSPITAL | Age: 82
DRG: 205 | End: 2022-05-25
Payer: MEDICARE

## 2022-05-25 ENCOUNTER — HOSPITAL ENCOUNTER (INPATIENT)
Facility: HOSPITAL | Age: 82
LOS: 3 days | Discharge: HOME HEALTH | DRG: 205 | End: 2022-05-29
Attending: EMERGENCY MEDICINE | Admitting: SURGERY
Payer: MEDICARE

## 2022-05-25 DIAGNOSIS — S25.101A: ICD-10-CM

## 2022-05-25 DIAGNOSIS — S12.001A CLOSED NONDISPLACED FRACTURE OF FIRST CERVICAL VERTEBRA, UNSPECIFIED FRACTURE MORPHOLOGY, INITIAL ENCOUNTER (HCC): ICD-10-CM

## 2022-05-25 DIAGNOSIS — S42.009A CLAVICLE FRACTURE: Primary | ICD-10-CM

## 2022-05-25 DIAGNOSIS — G89.11 ACUTE PAIN DUE TO TRAUMA: ICD-10-CM

## 2022-05-25 DIAGNOSIS — S42.101A CLOSED FRACTURE OF RIGHT SCAPULA, UNSPECIFIED PART OF SCAPULA, INITIAL ENCOUNTER: ICD-10-CM
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
## 2022-05-25 DIAGNOSIS — S22.31XA CLOSED FRACTURE OF ONE RIB OF RIGHT SIDE, INITIAL ENCOUNTER: ICD-10-CM

## 2022-05-25 DIAGNOSIS — R54 FRAILTY: ICD-10-CM

## 2022-05-25 LAB
BASOPHILS # BLD AUTO: 0.02 THOUSANDS/ΜL (ref 0–0.1)
BASOPHILS NFR BLD AUTO: 0 % (ref 0–1)
EOSINOPHIL # BLD AUTO: 0.1 THOUSAND/ΜL (ref 0–0.61)
EOSINOPHIL NFR BLD AUTO: 1 % (ref 0–6)
ERYTHROCYTE [DISTWIDTH] IN BLOOD BY AUTOMATED COUNT: 13.2 % (ref 11.6–15.1)
HCT VFR BLD AUTO: 38.7 % (ref 34.8–46.1)
HGB BLD-MCNC: 12.2 G/DL (ref 11.5–15.4)
IMM GRANULOCYTES # BLD AUTO: 0.06 THOUSAND/UL (ref 0–0.2)
IMM GRANULOCYTES NFR BLD AUTO: 1 % (ref 0–2)
LYMPHOCYTES # BLD AUTO: 0.61 THOUSANDS/ΜL (ref 0.6–4.47)
LYMPHOCYTES NFR BLD AUTO: 6 % (ref 14–44)
MCH RBC QN AUTO: 30 PG (ref 26.8–34.3)
MCHC RBC AUTO-ENTMCNC: 31.5 G/DL (ref 31.4–37.4)
MCV RBC AUTO: 95 FL (ref 82–98)
MONOCYTES # BLD AUTO: 0.69 THOUSAND/ΜL (ref 0.17–1.22)
MONOCYTES NFR BLD AUTO: 6 % (ref 4–12)
NEUTROPHILS # BLD AUTO: 9.6 THOUSANDS/ΜL (ref 1.85–7.62)
NEUTS SEG NFR BLD AUTO: 86 % (ref 43–75)
NRBC BLD AUTO-RTO: 0 /100 WBCS
PLATELET # BLD AUTO: 219 THOUSANDS/UL (ref 149–390)
PMV BLD AUTO: 10 FL (ref 8.9–12.7)
RBC # BLD AUTO: 4.06 MILLION/UL (ref 3.81–5.12)
WBC # BLD AUTO: 11.08 THOUSAND/UL (ref 4.31–10.16)

## 2022-05-25 PROCEDURE — 36000 PLACE NEEDLE IN VEIN: CPT | Performed by: SURGERY

## 2022-05-25 PROCEDURE — 80048 BASIC METABOLIC PNL TOTAL CA: CPT | Performed by: EMERGENCY MEDICINE

## 2022-05-25 PROCEDURE — 86850 RBC ANTIBODY SCREEN: CPT | Performed by: EMERGENCY MEDICINE

## 2022-05-25 PROCEDURE — 73000 X-RAY EXAM OF COLLAR BONE: CPT

## 2022-05-25 PROCEDURE — 99285 EMERGENCY DEPT VISIT HI MDM: CPT

## 2022-05-25 PROCEDURE — 99285 EMERGENCY DEPT VISIT HI MDM: CPT | Performed by: EMERGENCY MEDICINE

## 2022-05-25 PROCEDURE — 72125 CT NECK SPINE W/O DYE: CPT

## 2022-05-25 PROCEDURE — 93005 ELECTROCARDIOGRAM TRACING: CPT

## 2022-05-25 PROCEDURE — 70450 CT HEAD/BRAIN W/O DYE: CPT

## 2022-05-25 PROCEDURE — 36415 COLL VENOUS BLD VENIPUNCTURE: CPT | Performed by: EMERGENCY MEDICINE

## 2022-05-25 PROCEDURE — G1004 CDSM NDSC: HCPCS

## 2022-05-25 PROCEDURE — 99205 OFFICE O/P NEW HI 60 MIN: CPT | Performed by: SURGERY

## 2022-05-25 PROCEDURE — 85610 PROTHROMBIN TIME: CPT | Performed by: EMERGENCY MEDICINE

## 2022-05-25 PROCEDURE — 85025 COMPLETE CBC W/AUTO DIFF WBC: CPT | Performed by: EMERGENCY MEDICINE

## 2022-05-25 PROCEDURE — 86900 BLOOD TYPING SEROLOGIC ABO: CPT | Performed by: EMERGENCY MEDICINE

## 2022-05-25 PROCEDURE — 86901 BLOOD TYPING SEROLOGIC RH(D): CPT | Performed by: EMERGENCY MEDICINE

## 2022-05-25 RX ORDER — HYDROMORPHONE HCL IN WATER/PF 6 MG/30 ML
0.2 PATIENT CONTROLLED ANALGESIA SYRINGE INTRAVENOUS ONCE
Status: COMPLETED | OUTPATIENT
Start: 2022-05-26 | End: 2022-05-26

## 2022-05-25 RX ORDER — ACETAMINOPHEN 325 MG/1
650 TABLET ORAL ONCE
Status: COMPLETED | OUTPATIENT
Start: 2022-05-25 | End: 2022-05-25

## 2022-05-25 RX ORDER — HYDROCODONE BITARTRATE AND ACETAMINOPHEN 5; 325 MG/1; MG/1
1 TABLET ORAL ONCE
Status: COMPLETED | OUTPATIENT
Start: 2022-05-25 | End: 2022-05-25

## 2022-05-25 RX ADMIN — ACETAMINOPHEN 650 MG: 325 TABLET, FILM COATED ORAL at 22:50

## 2022-05-25 RX ADMIN — HYDROCODONE BITARTRATE AND ACETAMINOPHEN 1 TABLET: 5; 325 TABLET ORAL at 20:51

## 2022-05-26 ENCOUNTER — APPOINTMENT (EMERGENCY)
Dept: RADIOLOGY | Facility: HOSPITAL | Age: 82
DRG: 205 | End: 2022-05-26
Payer: MEDICARE

## 2022-05-26 PROBLEM — G89.11 ACUTE PAIN DUE TO TRAUMA: Status: ACTIVE | Noted: 2022-05-26
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
## 2022-05-26 PROBLEM — S42.001A CLOSED FRACTURE OF RIGHT CLAVICLE: Status: ACTIVE | Noted: 2022-05-26

## 2022-05-26 PROBLEM — S22.31XA CLOSED FRACTURE OF ONE RIB OF RIGHT SIDE: Status: ACTIVE | Noted: 2022-05-26

## 2022-05-26 PROBLEM — S12.9XXA CERVICAL SPINE FRACTURE (HCC): Status: ACTIVE | Noted: 2022-05-26

## 2022-05-26 LAB
ABO GROUP BLD: NORMAL
ANION GAP SERPL CALCULATED.3IONS-SCNC: 3 MMOL/L (ref 4–13)
ANION GAP SERPL CALCULATED.3IONS-SCNC: 5 MMOL/L (ref 4–13)
BASOPHILS # BLD AUTO: 0.01 THOUSANDS/ΜL (ref 0–0.1)
BASOPHILS NFR BLD AUTO: 0 % (ref 0–1)
BLD GP AB SCN SERPL QL: NEGATIVE
BUN SERPL-MCNC: 21 MG/DL (ref 5–25)
BUN SERPL-MCNC: 25 MG/DL (ref 5–25)
CALCIUM SERPL-MCNC: 9 MG/DL (ref 8.3–10.1)
CALCIUM SERPL-MCNC: 9.3 MG/DL (ref 8.3–10.1)
CHLORIDE SERPL-SCNC: 106 MMOL/L (ref 100–108)
CHLORIDE SERPL-SCNC: 108 MMOL/L (ref 100–108)
CO2 SERPL-SCNC: 26 MMOL/L (ref 21–32)
CO2 SERPL-SCNC: 27 MMOL/L (ref 21–32)
CREAT SERPL-MCNC: 0.7 MG/DL (ref 0.6–1.3)
CREAT SERPL-MCNC: 0.74 MG/DL (ref 0.6–1.3)
EOSINOPHIL # BLD AUTO: 0.02 THOUSAND/ΜL (ref 0–0.61)
EOSINOPHIL NFR BLD AUTO: 0 % (ref 0–6)
ERYTHROCYTE [DISTWIDTH] IN BLOOD BY AUTOMATED COUNT: 13.2 % (ref 11.6–15.1)
GFR SERPL CREATININE-BSD FRML MDRD: 74 ML/MIN/1.73SQ M
GFR SERPL CREATININE-BSD FRML MDRD: 79 ML/MIN/1.73SQ M
GLUCOSE SERPL-MCNC: 157 MG/DL (ref 65–140)
GLUCOSE SERPL-MCNC: 170 MG/DL (ref 65–140)
HCT VFR BLD AUTO: 37.3 % (ref 34.8–46.1)
HGB BLD-MCNC: 12 G/DL (ref 11.5–15.4)
IMM GRANULOCYTES # BLD AUTO: 0.03 THOUSAND/UL (ref 0–0.2)
IMM GRANULOCYTES NFR BLD AUTO: 0 % (ref 0–2)
INR PPP: 1.07 (ref 0.84–1.19)
LYMPHOCYTES # BLD AUTO: 0.38 THOUSANDS/ΜL (ref 0.6–4.47)
LYMPHOCYTES NFR BLD AUTO: 4 % (ref 14–44)
MCH RBC QN AUTO: 30.3 PG (ref 26.8–34.3)
MCHC RBC AUTO-ENTMCNC: 32.2 G/DL (ref 31.4–37.4)
MCV RBC AUTO: 94 FL (ref 82–98)
MONOCYTES # BLD AUTO: 0.7 THOUSAND/ΜL (ref 0.17–1.22)
MONOCYTES NFR BLD AUTO: 8 % (ref 4–12)
NEUTROPHILS # BLD AUTO: 7.84 THOUSANDS/ΜL (ref 1.85–7.62)
NEUTS SEG NFR BLD AUTO: 88 % (ref 43–75)
NRBC BLD AUTO-RTO: 0 /100 WBCS
PLATELET # BLD AUTO: 202 THOUSANDS/UL (ref 149–390)
PMV BLD AUTO: 9.9 FL (ref 8.9–12.7)
POTASSIUM SERPL-SCNC: 4.2 MMOL/L (ref 3.5–5.3)
POTASSIUM SERPL-SCNC: 4.4 MMOL/L (ref 3.5–5.3)
PROTHROMBIN TIME: 13.4 SECONDS (ref 11.6–14.5)
RBC # BLD AUTO: 3.96 MILLION/UL (ref 3.81–5.12)
RH BLD: POSITIVE
SODIUM SERPL-SCNC: 137 MMOL/L (ref 136–145)
SODIUM SERPL-SCNC: 138 MMOL/L (ref 136–145)
SPECIMEN EXPIRATION DATE: NORMAL
WBC # BLD AUTO: 8.98 THOUSAND/UL (ref 4.31–10.16)

## 2022-05-26 PROCEDURE — C1751 CATH, INF, PER/CENT/MIDLINE: HCPCS

## 2022-05-26 PROCEDURE — 36569 INSJ PICC 5 YR+ W/O IMAGING: CPT

## 2022-05-26 PROCEDURE — 99222 1ST HOSP IP/OBS MODERATE 55: CPT | Performed by: INTERNAL MEDICINE

## 2022-05-26 PROCEDURE — 80048 BASIC METABOLIC PNL TOTAL CA: CPT | Performed by: EMERGENCY MEDICINE

## 2022-05-26 PROCEDURE — 99232 SBSQ HOSP IP/OBS MODERATE 35: CPT | Performed by: EMERGENCY MEDICINE

## 2022-05-26 PROCEDURE — 71250 CT THORAX DX C-: CPT

## 2022-05-26 PROCEDURE — 92610 EVALUATE SWALLOWING FUNCTION: CPT

## 2022-05-26 PROCEDURE — 36415 COLL VENOUS BLD VENIPUNCTURE: CPT | Performed by: EMERGENCY MEDICINE

## 2022-05-26 PROCEDURE — G1004 CDSM NDSC: HCPCS

## 2022-05-26 PROCEDURE — 96374 THER/PROPH/DIAG INJ IV PUSH: CPT

## 2022-05-26 PROCEDURE — 02HV33Z INSERTION OF INFUSION DEVICE INTO SUPERIOR VENA CAVA, PERCUTANEOUS APPROACH: ICD-10-PCS | Performed by: EMERGENCY MEDICINE

## 2022-05-26 PROCEDURE — 74176 CT ABD & PELVIS W/O CONTRAST: CPT

## 2022-05-26 PROCEDURE — 85025 COMPLETE CBC W/AUTO DIFF WBC: CPT | Performed by: EMERGENCY MEDICINE

## 2022-05-26 RX ORDER — LIDOCAINE 50 MG/G
1 PATCH TOPICAL DAILY
Status: COMPLETED | OUTPATIENT
Start: 2022-05-26 | End: 2022-05-26

## 2022-05-26 RX ORDER — ONDANSETRON 2 MG/ML
INJECTION INTRAMUSCULAR; INTRAVENOUS
Status: COMPLETED
Start: 2022-05-26 | End: 2022-05-26

## 2022-05-26 RX ORDER — ACETAMINOPHEN 325 MG/1
975 TABLET ORAL EVERY 8 HOURS SCHEDULED
Status: DISCONTINUED | OUTPATIENT
Start: 2022-05-26 | End: 2022-05-29 | Stop reason: HOSPADM

## 2022-05-26 RX ORDER — HYDROMORPHONE HCL IN WATER/PF 6 MG/30 ML
0.2 PATIENT CONTROLLED ANALGESIA SYRINGE INTRAVENOUS
Status: DISCONTINUED | OUTPATIENT
Start: 2022-05-26 | End: 2022-05-29 | Stop reason: HOSPADM

## 2022-05-26 RX ORDER — OXYCODONE HYDROCHLORIDE 5 MG/1
2.5 TABLET ORAL EVERY 4 HOURS PRN
Status: DISCONTINUED | OUTPATIENT
Start: 2022-05-26 | End: 2022-05-27

## 2022-05-26 RX ORDER — OXYCODONE HYDROCHLORIDE 5 MG/1
5 TABLET ORAL EVERY 4 HOURS PRN
Status: DISCONTINUED | OUTPATIENT
Start: 2022-05-26 | End: 2022-05-27

## 2022-05-26 RX ORDER — AMOXICILLIN 250 MG
1 CAPSULE ORAL DAILY
Status: DISCONTINUED | OUTPATIENT
Start: 2022-05-26 | End: 2022-05-29 | Stop reason: HOSPADM

## 2022-05-26 RX ORDER — ENOXAPARIN SODIUM 100 MG/ML
30 INJECTION SUBCUTANEOUS EVERY 12 HOURS
Status: DISCONTINUED | OUTPATIENT
Start: 2022-05-26 | End: 2022-05-29 | Stop reason: HOSPADM

## 2022-05-26 RX ADMIN — ACETAMINOPHEN 975 MG: 325 TABLET, FILM COATED ORAL at 14:39

## 2022-05-26 RX ADMIN — ACETAMINOPHEN 975 MG: 325 TABLET, FILM COATED ORAL at 22:15

## 2022-05-26 RX ADMIN — HYDROMORPHONE HYDROCHLORIDE 0.2 MG: 0.2 INJECTION, SOLUTION INTRAMUSCULAR; INTRAVENOUS; SUBCUTANEOUS at 11:53

## 2022-05-26 RX ADMIN — SENNOSIDES AND DOCUSATE SODIUM 1 TABLET: 50; 8.6 TABLET ORAL at 14:40

## 2022-05-26 RX ADMIN — ENOXAPARIN SODIUM 30 MG: 30 INJECTION SUBCUTANEOUS at 17:02

## 2022-05-26 RX ADMIN — OXYCODONE HYDROCHLORIDE 5 MG: 5 TABLET ORAL at 23:09

## 2022-05-26 RX ADMIN — HYDROMORPHONE HYDROCHLORIDE 0.2 MG: 0.2 INJECTION, SOLUTION INTRAMUSCULAR; INTRAVENOUS; SUBCUTANEOUS at 00:11

## 2022-05-26 RX ADMIN — ONDANSETRON 4 MG: 2 INJECTION INTRAMUSCULAR; INTRAVENOUS at 00:57

## 2022-05-26 RX ADMIN — IOHEXOL 65 ML: 350 INJECTION, SOLUTION INTRAVENOUS at 01:05

## 2022-05-26 RX ADMIN — OXYCODONE HYDROCHLORIDE 2.5 MG: 5 TABLET ORAL at 04:46

## 2022-05-26 RX ADMIN — LIDOCAINE 5% 1 PATCH: 700 PATCH TOPICAL at 08:59

## 2022-05-26 NOTE — H&P
H&P - Trauma   Cholo Wynn 78 y o  female MRN: [id number]  Unit/Bed#: ED 11 Encounter: [id number]    Trauma Alert: Other trauma C   Model of Arrival: Private vehicle    Trauma Team: Attending Lisa Myrick, [facility] [place] and Fellow Natasha Burr  Consultants:     Neurosurgery: routine consult; Epic consult order placed; Assessment/Plan   Active Problems / Assessment:   C1 vertebral body fracture  C6, C7 transverse process fractures  Right clavicle fracture   Right 1st rib fracture  Right scapular fracture    Plan:   Neurosurgery consult  Maintain aspen collar  Rib fracture protocol  Pain control  APS, geriatric consults    History of Present Illness     Chief Complaint:    Mechanism:Fall down stairs    HPI:    Cholo Wynn is a 78 y o  female who presents to the ED after mechanical fall down 5 stairs at home  She was able to right herself without prolonged down time  CT C-spine showed C1 vertebral body fracture, C6 and C7 transverse process fractures  Deformity to right clavicle noted, fracture confirmed by x-ray  Trauma team was consulted followed by CT chest abdomen pelvis without contrast demonstrating right first rib and right scapular fractures  CTA neck scan was not obtained secondary to IV extravasation to the left upper extremity  Another IV was placed under US guidance but CTA deferred for time being  Review of Systems   All other systems reviewed and are negative  12-point, complete review of systems was reviewed and negative except as stated above       Historical Information     Past Medical History:   Diagnosis Date    CTS (carpal tunnel syndrome)     unspecified laterality    [facility])     left leg, s/p IVC filter 11/2015    Endometriosis     Fibromyalgia     Fibromyalgia, primary     Foot drop, left foot     Hypercholesteremia     Hypertension     Hypothyroidism     Lyme disease     treated 8/2015    Migraine     Migraine     Neurogenic claudication due to lumbar spinal stenosis     Osteoarthritis     PAF (paroxysmal atrial fibrillation) (H[facility])     s/p ablation [facility] (se[facility] V[facility] at [facility])   Roanna Kugel Peripheral neuropathy     Raynaud's syndrome without gangrene     S[address]ren's syndrome [address] [address]     Solitary pulmonary nodule on lung CT      Past Surgical History:   Procedure Laterality Date    ABDOMINAL HYSTERECTOMY      APPENDECTOMY      ATRIAL ABLATION SURGERY      cath    BREAST SURGERY Left     puncture aspiration of cyst onset 1972    CHOLECYSTECTOMY      onset 1981    COLONOSCOPY      fiberoptic    FOOT SURGERY Bilateral     onset 1993- removal of mortons neuroma    HAND SURGERY      gaglion cyst removal    HAND SURGERY      onset 1991 - carpal tunnel    IVC FILTER INSERTION      KNEE ARTHROSCOPY Left     therapeutic     LYMPH NODE BIOPSY      1996 onset    AK ARTHRODESIS POSTERIOR/POSTEROLATERAL LUMBAR N/A 3/29/2016    Procedure: L2-S1 LAMINECTOMY;  Surgeon: Colletta Estrin, DO;  Location: [facility];  Service: Orthopedics    TOTAL ABDOMINAL HYSTERECTOMY      onset 1989        Social History     Tobacco Use    Smoking status: Never Smoker    Smokeless tobacco: Never Used   Substance Use Topics    Alcohol use: No    Drug use: No     Immunization History   Administered Date(s) Administered    COVID-19 PFIZER VACCINE 0 3 ML IM 02/23/2021, 03/17/2021    Influenza Split High Dose Preservative Free IM 10/14/2015, 10/21/2016, 10/30/2017    Influenza, high dose seasonal 0 7 mL 11/06/2018, 01/18/2021, 09/02/2021    Influenza, seasonal, injectable 11/20/2012    Pneumococcal Conjugate 13-Valent 10/14/2015    Pneumococcal Polysaccharide PPV23 01/01/2007, 04/27/2017     Last Tetanus: unknown  Family History: Non-contributory    1  Before the illness or injury that brought you to the Emergency, did you need someone to help you on a regular basis? 0=No   2   Since the illness or injury that brought you to the Emergency, have you needed more help than usual to take care of yourself? 1=Yes   3  Have you been hospitalized for one or more nights during the past 6 months (excluding a stay in the Emergency Department)? 0=No   4  In general, do you see well? 0=Yes   5  In general, do you have serious problems with your memory? 0=No   6  Do you take more than three different medications everyday? 1=Yes   TOTAL   2     Did you order a geriatric consult if the score was 2 or greater?: yes     Meds/Allergies   all current active meds have been reviewed     Allergies   Allergen Reactions    Betaine     Cranberry Extract - Food Allergy     Cranberry Juice Powder - Food Allergy     Latex      Other reaction(s): Rash and itching    Soy Isoflavones      Other reaction(s): Itching    Soybean-Containing Drug Products - Food Allergy     Voltaren [Diclofenac Sodium]        Objective   Initial Vitals:   Temperature: (!) 97 4 °F (36 3 °C) (05/25/22 1949)  Pulse: 66 (05/25/22 1949)  Respirations: 18 (05/25/22 1949)  Blood Pressure: 161/85 (05/25/22 1949)    Primary Survey:   Airway:        Status: patent;        Pre-hospital Interventions: none        Hospital Interventions: none  Breathing:        Pre-hospital Interventions: none       Effort: normal       Right breath sounds: normal       Left breath sounds: normal  Circulation:        Rhythm:        Rate: regular   Right Pulses Left Pulses    R radial: 2+    R pedal: 2+     L radial: 2+    L pedal: 2+       Disability:        GCS: Eye: 4; Verbal: 5 Motor: 6 Total: 15       Right Pupil: round;  reactive         Left Pupil:  round;  reactive      R Motor Strength L Motor Strength    R : 5/5  R dorsiflex: 5/5  R plantarflex: 5/5 L : 5/5  L dorsiflex: 5/5  L plantarflex: 5/5        Sensory:  No sensory deficit  Exposure:           Secondary Survey:  Physical Exam  Vitals reviewed  Const: alert, no acute distress, non-toxic appearing, no diaphoresis   Appears stated age, well-developed, mildly frail appearing  Eyes: no conjunctival injection, discharge or icterus  Head: normocephalic  Ears: auricles normal   Nose: normal  Mouth/throat: clear, moist   Neck: aspen collar  CV: normal rate  Extremities warm and well-perfused   Resp: breathing unlabored, non-stridulous   Abdomen: soft, non-tender, non-distended  MSK: right clavicle deformity, ttp  Skin: warm and dry with rapid capillary refill  Neuro: CNs II-XII grossly intact  Oriented x 4  Sensation and motor function of extremities grossly intact  Psych: pleasant, cooperative     Invasive Devices  Report    Peripheral Intravenous Line  Duration           Peripheral IV 05/26/22 Left Arm <1 day              Lab Results: Results: I have personally reviewed all pertinent laboratory/tests results    Imaging Results: I have personally reviewed pertinent reports      Chest Xray(s): N/A   FAST exam(s): N/A   CT Scan(s): positive for acute findings: above   Additional Xray(s): pending       Code Status: Level 1 - Full Code  Advance Directive and Living Will:      Power of :    POLST:

## 2022-05-26 NOTE — PROCEDURES
Procedures    Procedure Note - Ultrasound Guided Peripheral IV    Ultrasound dynamic guidance was used for peripheral line insertion  Risks and benefits of the procedure were discussed with the patient  Discussed risks included pain with the procedure, bleeding, and risk of infection  Indication: Difficult non-ultrasound guided peripheral intravenous line insertion  Location: Laterally: left upper extremity  Procedure: The patient was prepped using standard ultrasound guided IV procedures  Using direct visualization of the intravenous catheter/needle, the vessel was successfully cannulated with return of blood and advancement of the catheter  The catheter was secured in the standard technique  Complications: None  Interpretation: Successful ultrasound guided peripheral IV  This is a billable ultrasound guided procedure  Ultrasound images stored on the [facility] ultrasound image , or as an attachment to this chart

## 2022-05-26 NOTE — QUICK NOTE
Patient with agreement to discuss her current hospitalization details with her son; Shayla Sas and time was delivered for questions

## 2022-05-26 NOTE — ASSESSMENT & PLAN NOTE
 Patients with depression and/or anxiety have more perceived pain on average and often require higher doses of opioid pain medication   Treat depression and/or anxiety aggressively

## 2022-05-26 NOTE — PROGRESS NOTES
Pastoral Care Progress Note    2022  Patient: Shine Blair : 1940  Admission Date & Time: 2022  MRN: [id number] [facility]: [id number]                     Chaplaincy Interventions Utilized:   Empowerment: Normalized experience of patient/family and Provided chaplaincy education    Exploration: Explored spiritual needs & resources and Facilitated story telling    Collaboration: Consulted with interdisciplinary team    Relationship Building: Cultivated a relationship of care and support and Listened empathically              Chaplaincy Outcomes Achieved:  Expressed gratitude, Expressed humor, and Identified meaningful connections        Spiritual Coping Strategies Utilized:   Positive spiritual reframing     spoke with patient about health journey and family dynamics  Pt explained her vicenet tradition originating from the [employer] and her paternal grandfather's ministry therein  Pt spoke positively about her health experiences

## 2022-05-26 NOTE — SPEECH THERAPY NOTE
Speech-Language Pathology Bedside Swallow Evaluation    Patient Name: Marquis Barrett    QUBHZ'P Date: 5/26/2022     Problem List  Active Problems:    * No active hospital problems  *    Past Medical History  Past Medical History:   Diagnosis Date    CTS (carpal tunnel syndrome)     unspecified laterality    DVT [facility]deep [facility] thrombosis) ([facility])     left leg, s/p IVC filter 11/2015    Endometriosis     Fibromyalgia     Fibromyalgia, primary     Foot drop, left foot     Hypercholesteremia     Hypertension     Hypothyroidism     Lyme disease     treated 8/2015    Migraine     Migraine     Neurogenic 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)   [address] [place] [address] Peripheral neuropathy     Raynaud's syndrome without gangrene     Sjogren's syndrome ([facility] [address] )     Solitary pulmonary nodule on lung CT      Past Surgical History  Past Surgical History:   Procedure Laterality Date    ABDOMINAL HYSTERECTOMY      APPENDECTOMY      ATRIAL ABLATION SURGERY      cath    BREAST SURGERY Left     puncture aspiration of cyst onset 1972    CHOLECYSTECTOMY      onset 1981    COLONOSCOPY      fiberoptic    FOOT SURGERY Bilateral     onset 1993- removal of mortons neuroma    HAND SURGERY      gaglion cyst removal    HAND SURGERY      onset 1991 - carpal tunnel    IVC FILTER INSERTION      KNEE ARTHROSCOPY Left     therapeutic     LYMPH NODE BIOPSY      1996 onset    DC ARTHRODESIS POSTERIOR/POSTEROLATERAL LUMBAR N/A 3/29/2016    Procedure: L2-S1 LAMINECTOMY;  Surgeon: Zeeshan Coronado DO;  [facility];  Service: Orthopedics    TOTAL ABDOMINAL HYSTERECTOMY      onset 1989       Summary  Pt presented with minimally impaired to functional appearing oral and pharyngeal stage swallowing skills with materials administered today   Coughing episode occurred with initial drink of water, suspect due to oral dryness and reclined positioning  Pt had not had a drink for several hours prior  Once positioned fully upright, liquids were tolerated without s/s aspiration  Puree and solid foods were manipulated, transferred, and swallowed without difficulty as well  ST will f/u briefly to ensure regular diet/thin liquid tolerance  Risk/s for Aspiration: mild    Recommended Diet: regular diet and thin liquids   Recommended Form of Meds: whole with puree   Aspiration precautions and swallowing strategies: upright posture, small bites/sips  Other Recommendations: Continue frequent oral care      Current Medical Status per H&P 5/25/22  Eleni Pulido is a 78 y o  female who presents to the ED after mechanical fall down 5 stairs at home  She was able to right herself without prolonged down time  CT C-spine showed C1 vertebral body fracture, C6 and C7 transverse process fractures  Deformity to right clavicle noted, fracture confirmed by x-ray  Trauma team was consulted followed by CT chest abdomen pelvis without contrast demonstrating right first rib and right scapular fractures  CTA neck scan was not obtained secondary to IV extravasation to the left upper extremity  Another IV was placed under US guidance but CTA deferred for time being  Review of Systems   All other systems reviewed and are negative  12-point, complete review of systems was reviewed and negative except as stated above  Special Studies:  CT chest 5/26/22 LUNGS:  Atelectasis in the left upper lung field dependently and in the bilateral lower lung field  3 mm nodule in the left lower lobe (axial image 32, series 2)  4 mm nodule in the left lingula (axial image 37, series 2)  4 mm nodule in the medial   right middle lobe (axial image 35, series 2)      Social/Education/Vocational Hx:  Pt lives alone    Swallow Information   Current Risks for Dysphagia & Aspiration: recent fall  Current Diet: diet not yet ordered   Baseline Diet: regular diet and thin liquids      Baseline Assessment   Behavior/Cognition: alert  Speech/Language Status: able to participate in conversation and able to follow commands  Patient Positioning: upright in bed  Pain Status/Interventions/Response to Interventions: No report of or nonverbal indications of pain  Swallow Mechanism Exam  Facial: symmetrical  Labial: WFL  Lingual: WFL  Velum: symmetrical  Mandible: adequate ROM  Dentition: natural, few missing  Vocal quality:clear/adequate   Volitional Cough: adequate   Respiratory Status: on RA      Consistencies Assessed and Performance   Consistencies Administered: thin liquids, puree and solid (toast)    Oral Stage: minimal  Mastication was adequate with the materials administered today  Bolus formation and transfer were functional with no significant oral residue noted  Possible spillage/reduced control with thin liquids  Pharyngeal Stage: minimal  Swallow Mechanics: Swallowing initiation appeared prompt  Laryngeal rise was palpated and judged to be within functional limits  Cough noted with initial swallow of thin, but no other trials  Esophageal Concerns: dx of GERD    Summary and Recommendations (see above)    Results Reviewed with: patient and RN     Treatment Recommended: brief f/u     Dysphagia LTG  -Patient will demonstrate safe and effective oral intake (without overt s/s significant oral/pharyngeal dysphagia including s/s penetration or aspiration) for the highest appropriate diet level  Short Term Goals:  -Pt will tolerate regular diet and thin liquid with no significant s/s oral or pharyngeal dysphagia across 1-3 diagnostic sessions

## 2022-05-26 NOTE — CONSULTS
Consultation - Geriatric Medicine   Flex Paris 78 y o  female MRN: [id number]  Unit/Bed#: ED 11 Encounter: [id number]      Assessment/Plan     Ambulatory dysfunction with fall  -reportedly mechanical fall down five steps at home yesterday   -maintained on ASA daily   -(+) head strike (-) loss of consciousness   -injuries as outlined below  -Requires use of walker for ambulation at baseline  -hx recurrent falls due to impaired balance  -high risk future falls due to age, hx fall, impaired vision, peripheral neuropathy, left footdrop, deconditioning/debility and unfamiliar environment   -encourage good body mechanics and assist with all transfers  -keep personal items and call bell close to prevent reaching  -maintain environment free of fall hazards  -encourage appropriate footwear and adequate lighting at all times when out of bed  -recommend home fall risk assessment and personal fall alert system on returning home  -PT and OT pending    Committed fracture right clavicle  -s/p fall as outlined above  -noted on CT chest abdomen pelvis admission  -NWB RUE in sling  -neurovascular checks per protocol  -acute pain control  -Ortho consult pending    Right 1st rib fracture  -mildly displaced per admission imaging  -no underlying atelectasis reported on CT on admit  -saturating well on room air - monitor resp status closely   -acute pain control     Right scapular fracture  -plan as above    Fracture of left inferior articulating process C1 vertebral body  -noted on CT C-spine on admission  -CTA neck ordered and pending, study initially deferred due to contrast extravasation LUE  -C-spine precautions - collar at all times  -neurovascular checks per protocol  -acute pain control  -Nsx consult pending     Fracture of transverse processes of C6 and C7 cervical vertebrae  -plan as above     Acute pain due to trauma  -consider pain control per Geriatric pain protocol with titration to achieve adequate pain control  Tylenol 975mg Q8H scheduled  Roxicodone 2 5mg Q4H PRN moderate pain  Roxicodone 5mg Q4H PRN severe pain  Dilaudid 0 2mg Q4H PRN  -consider adjuncts such as lidocaine patch topically  -PATIENT DOES NOT TOLERATE GABAPENTIN   -continue home Duloxetine   -encourage addition of non-pharmacologic pain treatment including ice and frequent repositioning  -recommend  bowel regimen to prevent and treat constipation due to increased risk with acute pain and opiate pain medications  -consider APS consult     Left footdrop  -chronic, utilizes AFO brace  -increases risk of falls - continue use of bracing as appropriate   -PT/OT/Neuro    Depression with anxiety  -maintained on Duloxetine 60mg daily reduced from 90mg as o/p due to tolerance   -symptoms appear to be well controlled on current regimen  -appears to be coping with loss of  appropriately (coming up on one year anniversary) but may benefit from additional support as nearing anniversary of his passing   -good support system with son who resides nextdoor   -continue to encourage engagement with family and friends  -continue close o/p f/u with PCP    Cognitive screening   -alert and fully oriented, independent with ADLs and mostly independent with iADLs  -MoCA  24/30 (4/2016), MMSE 30/30 (5/2018), consider repeat as o/p following recovery from acute injuries, may not have been reflective of true baseline if was influenced by recent traumatic life events or depression   -CTH obtained on admission personally reviewed, revealed mild chronic microangiopathic changes  -TSH 2 1, B12 653  -continue to ensure that underlying anxiety/depression adequately treated as may impact patient's memory and cognition  -encourage use of sensory assist device such as glasses and hearing aids at all appropriate times to reduce risk sensory impairment contributing to isolation, confusion, encephalopathy more precipitous cognitive decline  -encourage patient remain physically socially and cognitively active engaged to maintain cognitive acuity    Hypothyroidism  -continue home levothyroxine regimen close outpatient follow-up with PCP for ongoing dose titration and medication management    Fibromyalgia/Lyme disease  -follows with Neurology as outpatient  -continue symptomatic cares and psychosocial supports    Migraine headache disorder  -well controlled on Topamax 50 mg daily  -continue close outpatient follow-up with Neurology    Impaired Vision  -recommend use of corrective lenses at all appropriate times  -encourage adequate lighting and encourage use of assistance with ambulation  -keep personal belongings close to person to avoid reaching  -encourage appropriate footwear at all times  -recommend large font for printed materials provided to patient    Impaired Hearing  -Encourage use of hearing aids at all appropriate times  -encourage providers and caregivers to speak slowly and clearly directly to patient  -minimize background noise to encourage patient engagement  -consider use of hearing amplifier to reduce risk of straining to hear if hearing aids are not present or are not sufficient   -encourage use of teach back method to ensure clear communication    Deconditioning/debility/frailty  -clinic today scale stage 5, mildly frail  -multifactorial including probable mild peripheral neuropathy, depression, multiple chronic comorbidities now with fall and acute traumatic injury superimposed in elderly individual with limited physiologic and metabolic reserve  -albumin 3 9, encourage well-balanced nutrition consider nutritional supplements between meals if oral intake is poor  -continue optimization chronic medical conditions and address acute metabolic derangements as arise    Delirium precautions  -Patient is high risk of delirium due to age, fall, traumatic injuries, acute pain, hospitalization, polypharmacy, possible underlying MCI  -Initiate delirium precautions  -maintain normal sleep/wake cycle  -minimize overnight interruptions, group overnight vitals/labs/nursing checks as possible  -dim lights, close blinds and turn off tv to minimize stimulation and encourage sleep environment in evenings  -ensure that pain is well controlled   -monitor for fecal and urinary retention which may precipitate delirium  -encourage early mobilization and ambulation with assistance once cleared by Neurosurgery and Trauma to appropriately do so  -provide frequent reorientation and redirection as indicated and appropriate  -encourage family and friends at the bedside to help help calm patient if anxious  -minimize use of medications which may precipitate or worsen delirium such as tramadol, benzodiazepine, anticholinergics, and benadryl  -encourage hydration and nutrition   -redirect unwanted behaviors as first line    Home medication review    Personally confirmed with patient at bedside and confirmed with most recent PCP and outpatient managing subspecialists documentation:    Levothyroxine 88 mcg daily  Lisinopril 5 mg BID  Ezetimibe 10 mg daily  Duloxetine 60 mg daily (previously on 90 mg - reduced as o/p due to side effects)  Topamax 50 mg daily  Aspirin 81 mg daily   Multivitamin daily    Care coordination: Rounded with Anu (RN in ED)    History of Present Illness   Physician Requesting Consult: Imani Miller,*  Reason for Consult / Principal Problem:  Fall  Hx and PE limited by: N/A  Additional history obtained from: Chart review and patient evaluation    HPI: Eleni Pulido is a 78y o  year old female with fibromyalgia, history of DVT, hypertension, hyperlipidemia, hypothyroidism, osteoarthritis of knees, left footdrop, lumbar radiculopathy lumbar stenosis with neurogenic claudication, peripheral neuropathy, Raynaud disease without gangrene, paroxysmal AFib, insomnia, Sjogren syndrome, 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) and migraines who is admitted to Trauma Service with ambulatory dysfunction and fall found to have multiple cervical spine fractures, she is being seen in consultation by Geriatrics for high risk of developing delirium during hospitalization  She was seen and examined at bedside in ED where she is lying resting, she explains that she had a fall at home yesterday afternoon when she was carrying something up the stairs at home and lost her balance causing her to fall down about five stairs hitting her head in the process, she denies loss of consciousness but did note immediate severe pain in her neck and back  She did not expect her son to be home from work for a couple of hours so after she got her bearings she was able to maneuver herself on the step and banister to get up from the ground to call for help and got herself to bed until her son got home from work and was able to assist her  Today she notes severe pain in her neck and right shoulder primarily, she has only found relief with IV dilaudid so far and did not feel that the Norco provided any relief  Prior to admission Sheyla Em was residing home alone since her  of over 48 years passed from a stroke last July  She is independent with ADLs and iADLs except for driving which she is retired from and depends on her son who lives next to her to assist with transportation  She had previously been diagnosed with MCI but feels that her memory is better than most people her age and she is able to provide names and doses of her home medications from memory and these were confirmed with her PCP records as correct  She requires use of corrective lenses and hearing aids, does not use dentures   She requires use of walker when outside of the home and has history of recurrent falls which she attributes to balance issues following history of Lyme disease in the relatively distant past     Inpatient consult to Gerontology  Consult performed by: Gladys Pierre DO  Consult ordered by: Nicole Shaikh MD        Review of Systems   Constitutional: Negative for appetite change (hungry), chills and fever  HENT: Positive for hearing loss (uses hearing aid)  Negative for dental problem  Eyes: Positive for visual disturbance (uses glasses )  Respiratory: Negative  Negative for shortness of breath  Cardiovascular: Negative  Gastrointestinal: Negative  Negative for nausea (earlier in evening now resolved )  Genitourinary: Negative  Musculoskeletal: Positive for arthralgias, back pain, gait problem and neck pain  Skin: Negative  Neurological: Negative for dizziness, light-headedness and headaches  Trouble with balance, not overt dizziness    Hematological: Negative  Psychiatric/Behavioral: Negative  All other systems reviewed and are negative      Historical Information   Past Medical History:   Diagnosis Date    CTS (carpal tunnel syndrome)     unspecified laterality    DVT (deep venous thrombosis) ([facility])     left leg, s/p IVC filter 11/2015    Endometriosis     Fibromyalgia     Fibromyalgia, primary     Foot drop, left foot     Hypercholesteremia     Hypertension     Hypothyroidism     Lyme disease     treated 8/2015    Migraine     Migraine     Neurogenic claudication due to lumbar spinal stenosis     Osteoarthritis     PAF (paroxysmal atrial fibrillation) ([facility])     s/p ablation at [facility] (se[facility] Rea Higgins at [facility])   [facility] Peripheral neuropathy     Raynaud's syndrome without gangrene     Sjogren's syndrome ([facility] [address] )     Solitary pulmonary nodule on lung CT      Past Surgical History:   Procedure Laterality Date    ABDOMINAL HYSTERECTOMY      APPENDECTOMY      ATRIAL ABLATION SURGERY      cath    BREAST SURGERY Left     puncture aspiration of cyst onset 1972    CHOLECYSTECTOMY      onset 1981    COLONOSCOPY      fiberoptic    FOOT SURGERY Bilateral     onset 1993- removal of mortons neuroma    HAND SURGERY      gaglion cyst removal    HAND SURGERY      onset 1991 - carpal tunnel    IVC FILTER INSERTION      KNEE ARTHROSCOPY Left     therapeutic     LYMPH NODE BIOPSY      1996 onset    LA ARTHRODESIS POSTERIOR/POSTEROLATERAL LUMBAR N/A 3/29/2016    Procedure: L2-S1 LAMINECTOMY;  Surgeon: Kevyn Holguin DO;  Location: [facility];  Service: Orthopedics    TOTAL ABDOMINAL HYSTERECTOMY      onset 44     Social History   Social History     Substance and Sexual Activity   Alcohol Use No     Social History     Substance and Sexual Activity   Drug Use No     Social History     Tobacco Use   Smoking Status Never Smoker   Smokeless Tobacco Never Used     Family History:   Family History   Problem Relation Age of Onset    Heart disease Father     ADD / ADHD Father     Stroke Father     Cancer Brother         prostate    Heart attack Maternal Grandmother         acute MI    Cancer Family       Meds/Allergies   all current active meds have been reviewed    Allergies   Allergen Reactions    Betaine     Cranberry Extract - Food Allergy     Cranberry Juice Powder - Food Allergy     Latex      Other reaction(s): Rash and itching    Soy Isoflavones      Other reaction(s): Itching    Soybean-Containing Drug Products - Food Allergy     Voltaren [Diclofenac Sodium]        Objective   No intake or output data in the 24 hours ending 05/26/22 0707  Invasive Devices  Report    None               Physical Exam  Vitals and nursing note reviewed  Constitutional:       General: She is not in acute distress  Comments: Elderly female appears stated age    HENT:      Head: Normocephalic  Nose: Nose normal       Mouth/Throat:      Mouth: Mucous membranes are dry  Comments: Dentition intact  Eyes:      General: No scleral icterus  Right eye: No discharge  Conjunctiva/sclera: Conjunctivae normal       Comments: Pupils equal round   Neck:      Comments: Cervical collar in place, phonation normal  Cardiovascular:      Rate and Rhythm: Normal rate  Pulses: Normal pulses  Pulmonary:      Effort: Pulmonary effort is normal  No respiratory distress  Breath sounds: No wheezing  Abdominal:      General: There is no distension  Palpations: Abdomen is soft  Tenderness: There is no abdominal tenderness  Musculoskeletal:      Right lower leg: No edema  Left lower leg: No edema  Comments: Diffuse subcutaneous fat and muscle wasting    RUE in sling   Skin:     General: Skin is warm and dry  Comments: Thin and friable    Neurological:      Mental Status: She is alert  Comments: Awake and alert, oriented, answers questions appropriately, speech clear fluent and following commands    Psychiatric:         Mood and Affect: Mood normal          Behavior: Behavior normal       Comments: Polite pleasant and cooperative       Lab Results:     I have personally reviewed pertinent lab results      I have personally reviewed the following imaging study reports in PACS:    5/25/22-CT head without contrast, CT C-spine without contrast  5/26/22- CT chest abdomen pelvis without contrast    Therapies:   PT:  Pending  OT:  Pending    VTE Prophylaxis: Enoxaparin (Lovenox)    Code Status: Level 1 - Full Code  Advance Directive and Living Will:      Power of :    POLST:      Family and Social Support:  Son    Goals of Care:  Pain control and get home medications and be cleared to eat breakfast

## 2022-05-26 NOTE — ED ATTENDING ATTESTATION
5/25/2022  ILaura MD, saw and evaluated the patient  I have discussed the patient with the resident/non-physician practitioner and agree with the resident's/non-physician practitioner's findings, Plan of Care, and MDM as documented in the resident's/non-physician practitioner's note, except where noted  All available labs and Radiology studies were reviewed  I was present for key portions of any procedure(s) performed by the resident/non-physician practitioner and I was immediately available to provide assistance  At this point I agree with the current assessment done in the Emergency Department    I have conducted an independent evaluation of this patient a history and physical is as follows:  Right clavicle injury   From a mechanical fall  Also hit head   No loc  no complaints of headache or neck pain she complains mainly of pain over right clavicle she is on aspirin  Exam patient is in no acute distress HEENT exam pupils equal round reactive to light extraocular muscles intact  Neck no JVD nontender full range of motion   lungs clear chest wall nontender heart is regular no murmurs abdomen soft nontender pelvis stable extremities there is tenderness right over her right clavicle  Impression right clavicle fracture minor head injury  CT head C-spine x-ray of the clavicle  ED Course         Critical Care Time  Procedures

## 2022-05-26 NOTE — ASSESSMENT & PLAN NOTE
Patient reported fall down 5 stairs at home; mechanical fall  CT cervical noncontrast impression-"There is a small fracture of an osteophyte from the left inferior articulating process of the C1 vertebral body  Right C6 transverse process fracture  Right C7 transverse process fracture   "  Neurosurgery with evaluation and treatment appreciate recommendations  Speech evaluation-regular diet and thin liquid with no significant oral dysphagia  Cervical collar in place  Multi modal pain medication in place along with supportive pharmacological bowel regimen  Patient denies numbness tingling; currently neurovascularly intact  PICC line ordered and placed-consent form and filed  Appreciate APS recommendations

## 2022-05-26 NOTE — CONSULTS
[facility]  Consult Note - Louann Calle 1940, 78 y o  female MRN: [id number]  Unit/Bed#: [facility] 607-01 Encounter: [id number]  Primary Care Provider: Angel Bonilla MD   Date and time admitted to hospital: 5/25/2022  7:53 PM    Acute pain due to trauma  Assessment & Plan  · Status fall down several steps  · Diagnosed with right clavicle, right scapula none right 1st rib fractures     · Continues to have moderate pain in right shoulder and neck  · States pain regimen at least moderately helpful  · Continue Tylenol 975 mg p o  q 8 hours scheduled  · Continue lidocaine patch, on for 12 hours and off for 12 hours  · Continue oxycodone 2 5 mg p o  q 4 hours p r n  moderate pain  · Continue oxycodone 5 mg p o  q 4 hours p r n  severe pain  · Continue Dilaudid 0 2 mg IV q 1 hour p r n  breakthrough pain  · Continue bowel regimen to avoid opioid induced constipation while patient on opioid pain medication  · Ice to painful area for up to 20 minutes every hour as needed  Chronic pain syndrome  Assessment & Plan  · Follows with primary care provider  · No chronic opioid use  Depression with anxiety  Assessment & Plan   Patients with depression and/or anxiety have more perceived pain on average and often require higher doses of opioid pain medication   Treat depression and/or anxiety aggressively  Louann Calle is a 78 y o  female  status post fall with right clavicle, right scapula and right 1st rib fractures  APS will continue to follow  Please contact Acute Pain Service [facility] via [facility] [employer] from [phone] with additional questions or concerns  See Reece or Connie for additional contacts and after hours information      History of Present Illness    Admit Date:  5/25/2022  Hospital Day:  0 days  Primary Service:  Trauma  Attending Provider:  Arturo Ray,*  Reason for Consult / Principal Problem:  Right shoulder and neck pain  HPI: Brandon Sage is a 78 y o  female who presents with right shoulder neck pain following a fall  Diagnosed with right scapula, right clavicle and right 1st rib fractures  Currently complaining of pain in these areas  States that the current pain regimen has been effective in moderating her pain  Per Orthopedics, fractures are to be treated conservatively without surgery  Patient history of chronic pain, however does not use chronic opioids  Current pain location(s):  Right shoulder, neck  Pain Scale:   5/10  Quality:  Aching, sharp  Current Analgesic regimen:     Tylenol 975 mg p o  q 8 hours scheduled  Oxycodone 2 5 mg p o  q 4 hours p r n  moderate pain  Oxycodone 5 mg p o  q 4 hours p r n  severe pain  Dilaudid 0 2 mg IV q 1 hour p r n  breakthrough pain  Lidocaine patch, on for 12 hours and off for 12 hours  Pain History:  Chronic lower back pain  Pain Management Provider:  Primary care provider    I have reviewed the patient's controlled substance dispensing history in the Prescription Drug Monitoring Program in compliance with the [facility] regulations before prescribing any controlled substances  Past 1 year review of PDMP:  Fill Date ID   Written Drug Qty Days Prescriber Rx # Pharmacy Refill   Daily Dose* Pymt Type      10/08/2021  1   03/30/2021  Gabapentin 300 MG Capsule    270 00  90  Mal   [id number]   Opt (7402)   1   [place]icare [place]         Consults    Review of Systems   Musculoskeletal: Positive for arthralgias, back pain and neck pain  All other systems reviewed and are negative        Historical Information   Past Medical History:   Diagnosis Date    CTS (carpal tunnel syndrome)     unspecified laterality    D[facility]deep venous thrombosis) ([facility])     left leg, s/p IVC filter 11/2015    Endometriosis     Fibromyalgia     Fibromyalgia, primary     Foot drop, left foot     Hypercholesteremia     Hypertension     Hypothyroidism     Lyme disease     treated 8/2015    Migraine  Migraine     Neurogenic claudication due to lumbar spinal stenosis     Osteoarthritis     PAF (paroxysmal atrial fibrillation) (HCC)     s/p ablation at [facility] (sees Dr Misael Bolton at [facility])   [place] Peripheral neuropathy     Raynaud's syndrome without gangrene     Sjogren's syndrome ([address] )     Solitary pulmonary nodule on lung CT      Past Surgical History:   Procedure Laterality Date    ABDOMINAL HYSTERECTOMY      APPENDECTOMY      ATRIAL ABLATION SURGERY      cath    BREAST SURGERY Left     puncture aspiration of cyst onset 1972    CHOLECYSTECTOMY      onset 1981    COLONOSCOPY      fiberoptic    FOOT SURGERY Bilateral     onset 1993- removal of mortons neuroma    HAND SURGERY      gaglion cyst removal    HAND SURGERY      onset 1991 - carpal tunnel    IVC FILTER INSERTION      KNEE ARTHROSCOPY Left     therapeutic     LYMPH NODE BIOPSY      1996 onset    [place] ARTHRODESIS POSTERIOR/POSTEROLATERAL LUMBAR N/A 3/29/2016    Procedure: L2-S1 LAMINECTOMY;  Surgeon: Cristiano Galicia DO;  [facility]: [facility];  Service: Orthopedics    TOTAL ABDOMINAL HYSTERECTOMY      onset 44     Social History   Social History     Substance and Sexual Activity   Alcohol Use Yes    Comment: per pt occassional [place] [employer] W[employer]     Social History     Substance and Sexual Activity   Drug Use No     Social History     Tobacco Use   Smoking Status Never Smoker   Smokeless Tobacco Never Used     Family History:   Family History   Problem Relation Age of Onset    Heart disease Father     ADD / ADHD Father     Stroke Father     Cancer Brother         prostate    Heart attack Maternal Grandmother         acute MI    Cancer Family        Meds/Allergies   all current active meds have been reviewed, current meds:   Current Facility-Administered Medications   Medication Dose Route Frequency    acetaminophen (TYLENOL) tablet 975 mg  975 mg Oral Q8H [address]    enoxaparin (LOVENOX) subcutaneous injection 30 mg  30 mg Subcutaneous Q12H  HYDROmorphone HCl (DILAUDID) injection 0 2 mg  0 2 mg Intravenous Q1H PRN    lidocaine (LIDODERM) 5 % patch 1 patch  1 patch Topical Daily    oxyCODONE (ROXICODONE) IR tablet 2 5 mg  2 5 mg Oral Q4H PRN    oxyCODONE (ROXICODONE) IR tablet 5 mg  5 mg Oral Q4H PRN    senna-docusate sodium (SENOKOT S) 8 6-50 mg per tablet 1 tablet  1 tablet Oral Daily    and PTA meds:   Prior to Admission Medications   Prescriptions Last Dose Informant Patient Reported? Taking? Cholecalciferol (VITAMIN D3) 5000 units CAPS  Self Yes No   Sig: Take 1,000 Units by mouth daily    DULoxetine (CYMBALTA) 30 mg delayed release capsule  Self No No   Sig: Take 3 capsules (90 mg dose) daily  Patient not taking: Reported on 3/7/2022    DULoxetine (CYMBALTA) 60 mg delayed release capsule   No No   Sig: Take 1 capsule (60 mg total) by mouth daily   Menthol 5 4 MG LOZG  Self No No   Sig: Take 1 lozenge every 2 hours as needed  Multiple Vitamin (multivitamin) tablet  Self Yes No   Sig: Take 1 tablet by mouth daily   aspirin (ECOTRIN LOW STRENGTH) 81 mg EC tablet  Self Yes No   Sig: Take 81 mg by mouth daily   chlorhexidine (PERIDEX) 0 12 % solution  Self Yes No   Sig: RINSE MOUTH WITH 15 ML (1 CAPFUL) FOR 30 SECONDS IN MORNING AND E   (REFER TO PRESCRIPTION NOTES)  ezetimibe (ZETIA) 10 mg tablet  Self Yes No   Sig: Take 10 mg by mouth daily     gabapentin (NEURONTIN) 300 mg capsule  Self No No   Sig: TAKE 1 CAPSULE BY MOUTH 3  TIMES DAILY   Patient not taking: Reported on 3/2/2022    levothyroxine 88 mcg tablet  Self No No   Sig: TAKE 1 TABLET BY MOUTH  DAILY   lisinopril (ZESTRIL) 5 mg tablet  Self Yes No   Sig: Take 5 mg by mouth 2 (two) times a day   topiramate (TOPAMAX) 50 MG tablet  Self No No   Sig: TAKE 1 TABLET BY MOUTH  DAILY      Facility-Administered Medications: None       Allergies   Allergen Reactions    Betaine     Cranberry Extract - Food Allergy     Cranberry Juice Powder - Food Allergy     Latex      Other reaction(s): Rash and itching    Soy Isoflavones      Other reaction(s): Itching    Soybean-Containing Drug Products - Food Allergy     Voltaren [Diclofenac Sodium]        Objective   Temp:  [97 4 °F (36 3 °C)-98 1 °F (36 7 °C)] 97 9 °F (36 6 °C)  HR:  [54-83] 58  Resp:  [16-20] 18  BP: (116-184)/(58-85) 127/60  No intake or output data in the 24 hours ending 05/26/22 1444    Physical Exam  Vitals and nursing note reviewed  Constitutional:       General: She is awake  She is not in acute distress  Appearance: She is not ill-appearing, toxic-appearing or diaphoretic  Interventions: Cervical collar in place  Eyes:      Conjunctiva/sclera: Conjunctivae normal       Pupils: Pupils are equal, round, and reactive to light  Cardiovascular:      Rate and Rhythm: Normal rate and regular rhythm  Skin:     General: Skin is warm and dry  Capillary Refill: Capillary refill takes less than 2 seconds  Neurological:      Mental Status: She is alert and oriented to person, place, and time  GCS: GCS eye subscore is 4  GCS verbal subscore is 5  GCS motor subscore is 6  Psychiatric:         Attention and Perception: Attention normal          Speech: Speech normal          Behavior: Behavior normal  Behavior is cooperative  Lab Results:   I have personally reviewed pertinent labs  , CBC:   Lab Results   Component Value Date    WBC 8 98 05/26/2022    HGB 12 0 05/26/2022    HCT 37 3 05/26/2022    MCV 94 05/26/2022     05/26/2022    MCH 30 3 05/26/2022    MCHC 32 2 05/26/2022    RDW 13 2 05/26/2022    MPV 9 9 05/26/2022    NRBC 0 05/26/2022   , CMP:   Lab Results   Component Value Date    SODIUM 137 05/26/2022    K 4 2 05/26/2022     05/26/2022    CO2 26 05/26/2022    BUN 21 05/26/2022    CREATININE 0 70 05/26/2022    CALCIUM 9 0 05/26/2022    EGFR 79 05/26/2022   , BMP:  Lab Results   Component Value Date    SODIUM 137 05/26/2022    K 4 2 05/26/2022     05/26/2022    CO2 26 05/26/2022    BUN 21 05/26/2022    CREATININE 0 70 05/26/2022    GLUC 170 (H) 05/26/2022    CALCIUM 9 0 05/26/2022    AGAP 3 (L) 05/26/2022    EGFR 79 05/26/2022   , PT/PTT:No results found for: PT, PTT Estimated Creatinine Clearance: 56 mL/min (by C-G formula based on SCr of 0 7 mg/dL)  Imaging Studies: I have personally reviewed pertinent reports  EKG, Pathology, and Other Studies: I have personally reviewed pertinent reports  Counseling / Coordination of Care  Greater than 50% of total time was spent with the patient and / or family counseling and / or coordination of care  A description of the counseling / coordination of care:  Patient interview, physical examination, review of PDMP, review of medical record, review of imaging and laboratory data, development of pain management plan, discussion of pain management plan with patient and primary service  Please note that the APS provides consultative services regarding pain management only  With the exception of ketamine and epidural infusions and except when indicated, final decisions regarding starting or changing doses of analgesic medications are at the discretion of the consulting service  Off hours consultation and/or medication management is generally not available      Oswaldo Bernal PA-C  Acute Pain Service

## 2022-05-26 NOTE — ASSESSMENT & PLAN NOTE
· Status fall down several steps  · Diagnosed with right clavicle, right scapula none right 1st rib fractures     · Continues to have moderate pain in right shoulder and neck  · States pain regimen at least moderately helpful  · Continue Tylenol 975 mg p o  q 8 hours scheduled  · Continue lidocaine patch, on for 12 hours and off for 12 hours  · Continue oxycodone 2 5 mg p o  q 4 hours p r n  moderate pain  · Continue oxycodone 5 mg p o  q 4 hours p r n  severe pain  · Continue Dilaudid 0 2 mg IV q 1 hour p r n  breakthrough pain  · Continue bowel regimen to avoid opioid induced constipation while patient on opioid pain medication  · Ice to painful area for up to 20 minutes every hour as needed

## 2022-05-26 NOTE — ASSESSMENT & PLAN NOTE
C1 osteophyte fx, right C6 and right C7 TP fractures s/p fall down the stairs at home  · Patient currently in collar, c/o right sided neck pain  · Also with rib and right clavicle fracture  · Exam GCS 15, nonfocal exam apart from mild TTP right cervical spine    Imaging:  · CT cervical spine w/o, 5/26/22: There is a small fracture of an osteophyte from the left inferior articulating process of the C1 vertebral body  Right C6 and C7 transverse process fracture  Plan:  · Imaging reviewed with patient, showing several non structural fractures  · Can defer CTA as the foramen remain intact  · No upright xray indicated - fractures will not be visualized  · Collar discontinued  Ordered soft collar PRN for comfort  · Medical management and pain control per primary team  · Routine neuro checks  · PT/OT evaluation  · DVT ppx: ana m Fuller for pharm ppx from a NSG standpoint  · No neurosurgical intervention is anticipated at this time    Neurosurgery will sign off at this time  Patient can follow up in our office on an as needed basis or if symptoms worsen  Please call with questions or concerns

## 2022-05-26 NOTE — PROGRESS NOTES
[address] [facility]  Progress Note - Shauna Ortiz 1940, 78 y o  female MRN: [id number]  Unit/Bed#: [facility] 607-01 Encounter: [id number]  Primary Care Provider: Joshua Mckeon MD   Date and time admitted to hospital: 5/25/2022  7:53 PM    Closed fracture of one rib of right side  Assessment & Plan  Patient reports fall down 5 stairs proximally, mechanical fall  CT chest abdomen pelvis- "   Mildly displaced fracture of the anterior right 1st rib   "  No carotid bruits bilaterally  Multi modal pain medication engaged  Right clavicle also fractured; currently in right arm sling  Appreciate APS recommendations    Fall down stairs  Assessment & Plan  Patient reports fall down 5 stairs approximately  Head CT negative  Cervical spine CT demonstrates small fracture of the osteophyte of from the left anterior articular in process of the C1 vertebral body, and C6 and C7 right transverse process fractures; right clavicle fracture  Patient self ambulatory with no difficulty; lower extremities with no pain  APS with evaluation treatment; appreciate recommendations    Cervical spine fracture [facility])  Assessment & Plan  Patient reported fall down 5 stairs at home; mechanical fall  CT cervical noncontrast impression-"There is a small fracture of an osteophyte from the left inferior articulating process of the C1 vertebral body  Right C6 transverse process fracture  Right C7 transverse process fracture   "  Neurosurgery with evaluation and treatment appreciate recommendations  Speech evaluation-regular diet and thin liquid with no significant oral dysphagia  Cervical collar in place  Multi modal pain medication in place along with supportive pharmacological bowel regimen  Patient denies numbness tingling; currently neurovascularly intact  PICC line ordered and placed-consent form and filed  Appreciate APS recommendations      Closed fracture of right clavicle  Assessment & Plan  Patient with reported fall down 5 stairs at home; mechanical fall  Cervical ct non contrast- impression-   "Right clavicle fracture "  Inpatient consult to acute pain service  PICC line placed; consent form completed and filed  Multi modal pain medication regimen engaged  Right arm sling in place  Orthopedics evaluation and treatment; with non operative management, pain control, right arm sling  Appreciate APS recommendations  TERTIARY TRAUMA SURVEY NOTE    Prophylaxis: Enoxaparin (Lovenox)    Disposition:  Stable and continues to require med surg treatment    Code status:  Level 1 - Full Code    Consultants:  Orthopedics, APS, geriatrics, neurosurgery      SUBJECTIVE:     Transfer from:  Home  Mechanism of Injury:Fall    Chief Complaint:  Neck pain and right shoulder pain    HPI/Last 24 hour events: "Magaly Etienne is a 78 y o  female who presents to the ED after mechanical fall down 5 stairs at home  She was able to right herself without prolonged down time  CT C-spine showed C1 vertebral body fracture, C6 and C7 transverse process fractures  Deformity to right clavicle noted, fracture confirmed by x-ray  Trauma team was consulted followed by CT chest abdomen pelvis without contrast demonstrating right first rib and right scapular fractures  CTA neck scan was not obtained secondary to IV extravasation to the left upper extremity  Another IV was placed under US guidance but CTA deferred for time being  "  As per Dr Marcelo Huynh, Resident    Patient hemodynamically stable and afebrile  Patient with PICC line procedure performs after consent was completed and filed  Patient reports mechanical fall I can not believe this happened "  Patient currently in right arm sling with APS to evaluate and treat  Patient currently has moderate amount of pain controlled after multimodal pain medication in place  Patient reports normal bowel movements and normal appetite dietary intake    Patient denies numbness tingling, loss of power in all extremities  Patient reports self ambulation with no difficulty  Patient denies chest pain, shortness of breath, and abdominal pain  Active medications:           Current Facility-Administered Medications:     acetaminophen (TYLENOL) tablet 975 mg, 975 mg, Oral, Q8H CATIE    enoxaparin (LOVENOX) subcutaneous injection 30 mg, 30 mg, Subcutaneous, Q12H    HYDROmorphone HCl (DILAUDID) injection 0 2 mg, 0 2 mg, Intravenous, Q1H PRN, 0 2 mg at 05/26/22 1153    lidocaine (LIDODERM) 5 % patch 1 patch, 1 patch, Topical, Daily, 1 patch at 05/26/22 0859    oxyCODONE (ROXICODONE) IR tablet 2 5 mg, 2 5 mg, Oral, Q4H PRN, 2 5 mg at 05/26/22 0446    oxyCODONE (ROXICODONE) IR tablet 5 mg, 5 mg, Oral, Q4H PRN    senna-docusate sodium (SENOKOT S) 8 6-50 mg per tablet 1 tablet, 1 tablet, Oral, Daily      OBJECTIVE:     Vitals:   Vitals:    05/26/22 1157   BP: 127/60   Pulse: (!) 54   Resp: 20   Temp:    SpO2:        Physical Exam:   GENERAL APPEARANCE:  Comfortable  NEURO:  Alert and oriented x3, no focal deficits  HEENT:  EOM intact, no pain on EOM, oropharynx clear and patent  CV:  Regular rate rhythm  LUNGS:  Clear to auscultation bilaterally  GI:  Soft nontender, nondistended  :  Voiding  MSK:  Lower extremities 4/5 neurovascularly intact bilaterally; right upper extremity in a sling; not moving secondary to pain right wrist 4/5, hand 4/5, left upper extremity 4/5 all extremities neurovascularly intact  SKIN:  Warm and well perfused      1  Before the illness or injury that brought you to the Emergency, did you need someone to help you on a regular basis? 0=No   2  Since the illness or injury that brought you to the Emergency, have you needed more help than usual to take care of yourself? 1=Yes   3  Have you been hospitalized for one or more nights during the past 6 months (excluding a stay in the Emergency Department)? 0=No   4  In general, do you see well? 0=Yes   5   In general, do you have serious problems with your memory? 0=No   6  Do you take more than three different medications everyday? 1=Yes   TOTAL   2     Did you order a geriatric consult if the score was 2 or greater?: yes         PIC Score  PIC Pain Score: 2 (5/26/2022 11:53 AM)  PIC Incentive Spirometry Score: Below alert volume (5/26/2022 11:53 AM)  PIC Cough Description: Strong (5/26/2022 11:53 AM)  PIC Total Score: 7 (5/26/2022 11:53 AM)       If the Total PIC Score </=5, did you consult APS and evaluate patient for further intervention?: yes      Pain:    Incentive Spirometry  Cough  3 = Controlled  4 = Above goal volume 3 = Strong  2 = Moderate  3 = Goal to alert volume 2 = Weak  1 = Severe  2 = Below alert volume 1 = Absent     1 = Unable to perform IS           I/O:   I/O     None          Invasive Devices: Invasive Devices  Report    Peripherally Inserted Central Catheter Line  Duration           PICC Line 05/26/22 Left Brachial <1 day                  Imaging:   CT chest abdomen pelvis wo contrast    Result Date: 5/26/2022  Impression: Comminuted fracture of the right clavicular midshaft  Mildly displaced fracture of the anterior right 1st rib  Nondisplaced fracture of the right scapula (axial image 4, series 2)  Nondisplaced fracture of the right C7 transverse process  Associated  soft tissue swelling in the right chest and supraclavicular region  Small right pleural effusion noted dependently  Large amount of contrast extravasation in the left upper extremity  Pleural-based lesion in the periphery of the left lower lung field measures approximately 2 5 x 1 3 cm in size, increased from the prior  Follow-up PET/CT and/or tissue sampling may be considered at the discretion of the referring caregiver  Subcentimeter nodules in the lungs, as described  Based on current Fleischner Society 2017 Guidelines on incidental pulmonary nodule, no routine follow-up is needed if the patient is low risk    If the patient is high risk, optional follow-up chest CT at 12 months can be considered  Coronary atherosclerosis, left renal cyst, colonic diverticulosis without evidence of acute diverticulitis, and other findings as above  Workstation performed: [id number]     XR clavicle RIGHT    Result Date: 5/26/2022  Impression: Comminuted fracture of the mid right clavicle  Workstation performed: [id number]     CT head wo contrast    Result Date: 5/25/2022  Impression: No acute intracranial abnormality  Workstation performed: [id number]     CT spine cervical without contrast    Result Date: 5/25/2022  Impression: There is a small fracture of an osteophyte from the left inferior articulating process of the C1 vertebral body  Right C6 transverse process fracture  Right C7 transverse process fracture     Subcutaneous emphysema along the right subclavian vein; has the patient undergone attempted intravenous line placement in this location    Right clavicle fracture    I personally discussed this study with Dr Merary Bliss on 5/25/2022 at 10:30 PM  Workstation performed: [id number]       Labs:   CBC:   Lab Results   Component Value Date    WBC 8 98 05/26/2022    HGB 12 0 05/26/2022    HCT 37 3 05/26/2022    MCV 94 05/26/2022     05/26/2022    MCH 30 3 05/26/2022    MCHC 32 2 05/26/2022    RDW 13 2 05/26/2022    MPV 9 9 05/26/2022    NRBC 0 05/26/2022     CMP:   Lab Results   Component Value Date     05/26/2022    CO2 26 05/26/2022    BUN 21 05/26/2022    CREATININE 0 70 05/26/2022    CALCIUM 9 0 05/26/2022    EGFR 79 05/26/2022

## 2022-05-26 NOTE — ED PROCEDURE NOTE
Procedure  US Guided Peripheral IV    Date/Time: 5/26/2022 1:50 AM  Performed by: Andrade Caro DO  Authorized by: Andrade Caro DO     Patient location:  ED  Performed by:  Resident  Other Assisting Provider: No    Indications:     Indications: difficulty obtaining IV access      Image availability:  Not saved  Procedure details:     Patient evaluated for contraindications to access (i e  fistula, thrombosis, etc): Yes      Standard clean technique used for ultrasound access: Yes      Location:  Left arm    Catheter size:  18 gauge    Number of attempts:  1    Successful placement: yes    Post-procedure details:     Post-procedure:  Dressing applied    Assessment: free fluid flow and no signs of infiltration      Post-procedure complications: none      Patient tolerance of procedure: Tolerated well, no immediate complications  Comments:      Limited site access due to right arm in sling and multiple areas of infiltrate on left  Went proximal to prior sites in left basilic vein  No sign of extrav, blood draws well and free fluid flow  No sign of infiltrate                       Andrade Caro DO  05/26/22 1104

## 2022-05-26 NOTE — CONSULTS
Cristopher Cadet 58 1940, 78 y o  female MRN: [id number]  Unit/Bed#: [facility] 607-01 Encounter: [id number]  Primary Care Provider: Michelle Faith MD   Date and time admitted to hospital: 5/25/2022  7:53 PM    Inpatient consult to Neurosurgery  Consult performed by: Tristian Antunez PA-C  Consult ordered by: Anish Benites MD      Consult completed 5/26/22 at 11:00am    Cervical spine fracture [facility])  Assessment & Plan  C1 osteophyte fx, right C6 and right C7 TP fractures s/p fall down the stairs at home  · Patient currently in collar, c/o right sided neck pain  · Also with rib and right clavicle fracture  · Exam GCS 15, nonfocal exam apart from mild TTP right cervical spine    Imaging:  · CT cervical spine w/o, 5/26/22: There is a small fracture of an osteophyte from the left inferior articulating process of the C1 vertebral body  Right C6 and C7 transverse process fracture  Plan:  · Imaging reviewed with patient, showing several non structural fractures  · Can defer CTA as the foramen remain intact  · No upright xray indicated - fractures will not be visualized  · Collar discontinued  Ordered soft collar PRN for comfort  · Medical management and pain control per primary team  · Routine neuro checks  · PT/OT evaluation  · DVT ppx: ana m Fuller for pharm ppx from a NSG standpoint  · No neurosurgical intervention is anticipated at this time    Neurosurgery will sign off at this time  Patient can follow up in our office on an as needed basis or if symptoms worsen  Please call with questions or concerns      Acute pain due to trauma  Assessment & Plan  APS consulted and following      History of Present Illness     HPI: Janae Andre is a 78y o  year old female with PMH including h/o DVT in 2015 s/p IVC filter, chronic left foot drop, HLD, HTN, h/o lyme disease, atrial fibrillation s/p ablation, Raynaud's syndrome, Sjogren's syndrom who presents after a fall down the stairs at home  She reportedly fell down 5 stairs, did not hit her head, did not lose consciousness  She remembers the fall  Imaging was significant for C1 osteophyte fracture, right C6 TP fracture, right C7 TP fracture, right displaced clavicle fracture, first rib and scapular fractures  APS was consulted for pain control  CTA was ordered but not obtained secondary to IV extravasation LUE; PICC line was placed earlier today  Currently she is complaining of mild right sided neck pain but has FROM cervical spine  Her right arm is in a sling  She denies weakness, numbness, or tingling in her arms or legs bilaterally  She has not been OOB  She denies UI/BI  Review of Systems   Constitutional: Negative for chills and fever  HENT: Negative for ear pain and sore throat  Eyes: Negative for pain and visual disturbance  Respiratory: Negative for cough and shortness of breath  Cardiovascular: Negative for chest pain and palpitations  Gastrointestinal: Negative for abdominal pain and vomiting  Genitourinary: Negative for dysuria and hematuria  Musculoskeletal: Positive for arthralgias and neck pain  Negative for back pain  Skin: Negative for color change and rash  Neurological: Negative for seizures and syncope  All other systems reviewed and are negative        Historical Information   Past Medical History:   Diagnosis Date    CTS (carpal tunnel syndrome)     unspecified laterality    DVT (deep venous thrombosis) ([facility])     left leg, s/p IVC filter 11/2015    Endometriosis     Fibromyalgia     Fibromyalgia, primary     Foot drop, left foot     Hypercholesteremia     Hypertension     Hypothyroidism     Lyme disease     treated 8/2015    Migraine     Migraine     Neurogenic claudication due to lumbar spinal stenosis     Osteoarthritis     PAF (paroxysmal atrial fibrillation) ([facility])     s/p ablation at [facility] (sees Dr Elodia Copeland at [facility])   Ericka Patricia Peripheral neuropathy     Raynaud's syndrome without gangrene     Sjogren's syndrome ([facility] [address] )     Solitary pulmonary nodule on lung CT      Past Surgical History:   Procedure Laterality Date    ABDOMINAL HYSTERECTOMY      APPENDECTOMY      ATRIAL ABLATION SURGERY      cath    BREAST SURGERY Left     puncture aspiration of cyst onset 1972    CHOLECYSTECTOMY      onset 1981    COLONOSCOPY      fiberoptic    FOOT SURGERY Bilateral     onset 1993- removal of mortons neuroma    HAND SURGERY      gaglion cyst removal    HAND SURGERY      onset 1991 - carpal tunnel    IVC FILTER INSERTION      KNEE ARTHROSCOPY Left     therapeutic     LYMPH NODE BIOPSY      1996 onset    [place] ARTHRODESIS POSTERIOR/POSTEROLATERAL LUMBAR N/A 3/29/2016    Procedure: L2-S1 LAMINECTOMY;  Surgeon: Olamide Lilly DO;  [facility]: [facility];  Service: Orthopedics    TOTAL ABDOMINAL HYSTERECTOMY      onset 1989     Social History     Substance and Sexual Activity   Alcohol Use Yes    Comment: per pt occassional GLASS OF WINE     Social History     Substance and Sexual Activity   Drug Use No     Social History     Tobacco Use   Smoking Status Never Smoker   Smokeless Tobacco Never Used     Family History   Problem Relation Age of Onset    Heart disease Father     ADD / ADHD Father     Stroke Father     Cancer Brother         prostate    Heart attack Maternal Grandmother         acute MI    Cancer Family        Meds/Allergies   all current active meds have been reviewed, current meds:   Current Facility-Administered Medications   Medication Dose Route Frequency    acetaminophen (TYLENOL) tablet 975 mg  975 mg Oral Q8H [address]    enoxaparin (LOVENOX) subcutaneous injection 30 mg  30 mg Subcutaneous Q12H    HYDROmorphone HCl (DILAUDID) injection 0 2 mg  0 2 mg Intravenous Q1H PRN    lidocaine (LIDODERM) 5 % patch 1 patch  1 patch Topical Daily    oxyCODONE (ROXICODONE) IR tablet 2 5 mg  2 5 mg Oral Q4H PRN    oxyCODONE (ROXICODONE) IR tablet 5 mg  5 mg Oral Q4H PRN  senna-docusate sodium (SENOKOT S) 8 6-50 mg per tablet 1 tablet  1 tablet Oral Daily    and PTA meds:   Prior to Admission Medications   Prescriptions Last Dose Informant Patient Reported? Taking? Cholecalciferol (VITAMIN D3) 5000 units CAPS  Self Yes No   Sig: Take 1,000 Units by mouth daily    DULoxetine (CYMBALTA) 30 mg delayed release capsule  Self No No   Sig: Take 3 capsules (90 mg dose) daily  Patient not taking: Reported on 3/7/2022    DULoxetine (CYMBALTA) 60 mg delayed release capsule   No No   Sig: Take 1 capsule (60 mg total) by mouth daily   Menthol 5 4 MG LOZG  Self No No   Sig: Take 1 lozenge every 2 hours as needed  Multiple Vitamin (multivitamin) tablet  Self Yes No   Sig: Take 1 tablet by mouth daily   aspirin (ECOTRIN LOW STRENGTH) 81 mg EC tablet  Self Yes No   Sig: Take 81 mg by mouth daily   chlorhexidine (PERIDEX) 0 12 % solution  Self Yes No   Sig: RINSE MOUTH WITH 15 ML (1 CAPFUL) FOR 30 SECONDS IN MORNING AND E   (REFER TO PRESCRIPTION NOTES)  ezetimibe (ZETIA) 10 mg tablet  Self Yes No   Sig: Take 10 mg by mouth daily     gabapentin (NEURONTIN) 300 mg capsule  Self No No   Sig: TAKE 1 CAPSULE BY MOUTH 3  TIMES DAILY   Patient not taking: Reported on 3/2/2022    levothyroxine 88 mcg tablet  Self No No   Sig: TAKE 1 TABLET BY MOUTH  DAILY   lisinopril (ZESTRIL) 5 mg tablet  Self Yes No   Sig: Take 5 mg by mouth 2 (two) times a day   topiramate (TOPAMAX) 50 MG tablet  Self No No   Sig: TAKE 1 TABLET BY MOUTH  DAILY      Facility-Administered Medications: None     Allergies   Allergen Reactions    Betaine     Cranberry Extract - Food Allergy     Cranberry Juice Powder - Food Allergy     Latex      Other reaction(s): Rash and itching    Soy Isoflavones      Other reaction(s): Itching    Soybean-Containing Drug Products - Food Allergy     Voltaren [Diclofenac Sodium]        Objective   I/O       05/24 0701 05/25 0700 05/25 0701 05/26 0700 05/26 0701 05/27 0700    Urine (mL/kg/hr)   400 (0 7)    Total Output   400    Net   -400                 Physical Exam  Vitals and nursing note reviewed  Constitutional:       Appearance: Normal appearance  She is well-developed and normal weight  HENT:      Head: Normocephalic and atraumatic  Eyes:      Extraocular Movements: Extraocular movements intact  Pupils: Pupils are equal, round, and reactive to light  Cardiovascular:      Rate and Rhythm: Normal rate  Pulmonary:      Effort: Pulmonary effort is normal  No respiratory distress  Abdominal:      Palpations: Abdomen is soft  Musculoskeletal:         General: Normal range of motion  Cervical back: Normal range of motion  Tenderness (right sided, base of neck) present  Comments: RUE in sling   Skin:     General: Skin is warm and dry  Neurological:      General: No focal deficit present  Mental Status: She is alert and oriented to person, place, and time  Deep Tendon Reflexes:      Reflex Scores:       Brachioradialis reflexes are 2+ on the right side and 2+ on the left side  Patellar reflexes are 2+ on the right side and 2+ on the left side  Achilles reflexes are 2+ on the left side  Psychiatric:         Mood and Affect: Mood normal          Speech: Speech normal          Behavior: Behavior normal          Thought Content: Thought content normal          Judgment: Judgment normal        Neurologic Exam     Mental Status   Oriented to person, place, and time  Follows 2 step commands  Attention: normal  Concentration: normal    Speech: speech is normal   Level of consciousness: alert  Knowledge: good  Able to repeat  Normal comprehension  Cranial Nerves   Cranial nerves II through XII intact  CN III, IV, VI   Pupils are equal, round, and reactive to light  Motor Exam   Muscle bulk: normal  Overall muscle tone: normal    Strength   Strength 5/5 except as noted     RUE bi/tri/delt not assessed 2/2 injuries     Sensory Exam Light touch normal      Gait, Coordination, and Reflexes     Tremor   Resting tremor: absent    Reflexes   Right brachioradialis: 2+  Left brachioradialis: 2+  Right patellar: 2+  Left patellar: 2+  Left achilles: 2+  Left : 2+  Right Reeves: absent  Left Reeves: absent        Vitals:Blood pressure 127/60, pulse 58, temperature 97 9 °F (36 6 °C), resp  rate 18, height 5' 6" (1 676 m), weight 64 1 kg (141 lb 5 oz), SpO2 97 %  ,Body mass index is 22 81 kg/m²  Lab Results:   Results from last 7 days   Lab Units 05/26/22  0440 05/25/22  2341   WBC Thousand/uL 8 98 11 08*   HEMOGLOBIN g/dL 12 0 12 2   HEMATOCRIT % 37 3 38 7   PLATELETS Thousands/uL 202 219   NEUTROS PCT % 88* 86*   MONOS PCT % 8 6     Results from last 7 days   Lab Units 05/26/22  0440 05/25/22  2341   POTASSIUM mmol/L 4 2 4 4   CHLORIDE mmol/L 108 106   CO2 mmol/L 26 27   BUN mg/dL 21 25   CREATININE mg/dL 0 70 0 74   CALCIUM mg/dL 9 0 9 3             Results from last 7 days   Lab Units 05/25/22  2341   INR  1 07     No results found for: TROPONINT  ABG:No results found for: PHART, XYX9DFB, PO2ART, TDZ2OTY, U5VBSEBP, BEART, SOURCE    Imaging Studies: I have personally reviewed pertinent reports  and I have personally reviewed pertinent films in PACS     CT chest abdomen pelvis wo contrast    Result Date: 5/26/2022  Narrative: CT CHEST, ABDOMEN AND PELVIS WITHOUT IV CONTRAST INDICATION:   TRAUMA  Fall COMPARISON:  CT chest dated 8/26/2016 TECHNIQUE: CT examination of the chest, abdomen and pelvis was performed without intravenous contrast  This examination was performed without intravenous contrast in the context of the critical nationwide Omnipaque shortage  Axial, sagittal, and coronal 2D reformatted images were created from the source data and submitted for interpretation  Radiation dose length product (DLP) for this visit:  649 85 mGy-cm     This examination, like all CT scans performed in the [facility], was performed utilizing techniques to minimize radiation dose exposure, including the use of iterative  reconstruction and automated exposure control  Enteric contrast was not administered  FINDINGS: CHEST LUNGS:  Atelectasis in the left upper lung field dependently and in the bilateral lower lung field  3 mm nodule in the left lower lobe (axial image 32, series 2)  4 mm nodule in the left lingula (axial image 37, series 2)  4 mm nodule in the medial right middle lobe (axial image 35, series 2)  PLEURA:  Small right pleural effusion noted dependently  Pleural-based lesion in the periphery of the left lower lung field measures approximately 2 5 x 1 3 cm in size, increased from the prior  HEART/GREAT VESSELS: Mild coronary atherosclerosis  Heart appears normal in size  Uncoiled aorta  Mild aortic atherosclerosis; no thoracic aortic aneurysm  MEDIASTINUM AND ANNIE:  Unremarkable  CHEST WALL AND LOWER NECK:  Comminuted fracture of the right clavicular midshaft  Right acromioclavicular joint appears intact  Mildly displaced fracture of the anterior right 1st rib  Nondisplaced fracture of the right scapula (axial image 4, series 2)  Nondisplaced fracture of the right C7 transverse process  Hypoplastic thyroid gland  Posttraumatic soft tissue swelling in the right chest and supraclavicular region  Small amount of subcutaneous emphysema in the right chest and supraclavicular region extending into the lower right neck  Large amount of contrast extravasation in the left upper extremity  ABDOMEN LIVER/BILIARY TREE:  Unremarkable  GALLBLADDER:  Gallbladder is surgically absent  SPLEEN:  Unremarkable  PANCREAS:  Unremarkable  ADRENAL GLANDS:  Unremarkable  KIDNEYS/URETERS:  5 6 cm exophytic cyst in the lower pole of the left kidney  Otherwise grossly unremarkable; no hydronephrosis  STOMACH AND BOWEL:  Colonic diverticulosis  No discrete evidence of acute diverticulitis  No evidence of bowel obstruction    Otherwise grossly unremarkable  APPENDIX:  No findings to suggest appendicitis  ABDOMINOPELVIC CAVITY:  No ascites  No pneumoperitoneum  No lymphadenopathy  VESSELS:  Mild atherosclerosis; no aortic aneurysm  IVC filter at the level of the renal veins  PELVIS REPRODUCTIVE ORGANS:  Unremarkable for patient's age  URINARY BLADDER:  Unremarkable  ABDOMINAL WALL/INGUINAL REGIONS:  Body wall edema OSSEOUS STRUCTURES: Generalized osteopenia  Multilevel degenerative changes of the spine  Laminectomy from L3 through L5  No acute fracture or destructive osseous lesion  Impression: Comminuted fracture of the right clavicular midshaft  Mildly displaced fracture of the anterior right 1st rib  Nondisplaced fracture of the right scapula (axial image 4, series 2)  Nondisplaced fracture of the right C7 transverse process  Associated  soft tissue swelling in the right chest and supraclavicular region  Small right pleural effusion noted dependently  Large amount of contrast extravasation in the left upper extremity  Pleural-based lesion in the periphery of the left lower lung field measures approximately 2 5 x 1 3 cm in size, increased from the prior  Follow-up PET/CT and/or tissue sampling may be considered at the discretion of the referring caregiver  Subcentimeter nodules in the lungs, as described  Based on current Fleischner Society 2017 Guidelines on incidental pulmonary nodule, no routine follow-up is needed if the patient is low risk  If the patient is high risk, optional follow-up chest CT at 12 months can be considered  Coronary atherosclerosis, left renal cyst, colonic diverticulosis without evidence of acute diverticulitis, and other findings as above  Workstation performed: [id number]     XR clavicle RIGHT    Result Date: 5/26/2022  Narrative: RIGHT CLAVICLE INDICATION:   Obvious deformity  COMPARISON:  None VIEWS:  XR CLAVICLE RIGHT Images: 2 FINDINGS: Comminuted fracture of the mid right clavicle   Degenerative changes at the [facility] joint  No lytic or blastic osseous lesion  Soft tissues are unremarkable  Impression: Comminuted fracture of the mid right clavicle  Workstation performed: [id number]     XR knee 3 vw left non injury    Result Date: 5/8/2022  Narrative: Bilateral knee series: INDICATION:   M17 0: Bilateral primary osteoarthritis of knee  COMPARISON:  Left knee radiograph 10/14/2015 VIEWS:  XR KNEE 3 VW RIGHT NON INJURY, XR KNEE 3 VW LEFT NON INJURY FINDINGS: There is no acute fracture or dislocation  Small left joint effusion  No right joint effusion  Moderate tricompartmental osteoarthritis bilaterally as evidenced by joint space narrowing, osteophyte formation and subchondral sclerosis  No lytic or blastic osseous lesion  Soft tissues are unremarkable  Impression: No acute osseous abnormality  Degenerative changes as described  Workstation performed: [id number]     XR knee 3 vw right non injury    Result Date: 5/8/2022  Narrative: Bilateral knee series: INDICATION:   M17 0: Bilateral primary osteoarthritis of knee  COMPARISON:  Left knee radiograph 10/14/2015 VIEWS:  XR KNEE 3 VW RIGHT NON INJURY, XR KNEE 3 VW LEFT NON INJURY FINDINGS: There is no acute fracture or dislocation  Small left joint effusion  No right joint effusion  Moderate tricompartmental osteoarthritis bilaterally as evidenced by joint space narrowing, osteophyte formation and subchondral sclerosis  No lytic or blastic osseous lesion  Soft tissues are unremarkable  Impression: No acute osseous abnormality  Degenerative changes as described  Workstation performed: [id number]     CT head wo contrast    Result Date: 5/25/2022  Narrative: CT BRAIN - WITHOUT CONTRAST INDICATION:   Head trauma, moderate-severe fall with headstike aspirin  COMPARISON:  3/30/2012 TECHNIQUE:  CT examination of the brain was performed  In addition to axial images, sagittal and coronal 2D reformatted images were created and submitted for interpretation   Radiation dose length product (DLP) for this visit:  790 31 mGy-cm   This examination, like all CT scans performed in the [facility], was performed utilizing techniques to minimize radiation dose exposure, including the use of iterative  reconstruction and automated exposure control  IMAGE QUALITY:  Diagnostic  FINDINGS: PARENCHYMA: Decreased attenuation is noted in periventricular and subcortical white matter demonstrating an appearance that is statistically most likely to represent moderate microangiopathic change; this appearance is similar when compared to most recent prior examination  No CT signs of acute infarction  No intracranial mass, mass effect or midline shift  No acute parenchymal hemorrhage  VENTRICLES AND EXTRA-AXIAL SPACES:  Normal for the patient's age  VISUALIZED ORBITS AND PARANASAL SINUSES:  Unremarkable  CALVARIUM AND EXTRACRANIAL SOFT TISSUES: Scalp injury near the Vertex  Otherwise Normal      Impression: No acute intracranial abnormality  Workstation performed: [id number]     CT spine cervical without contrast    Result Date: 5/25/2022  Narrative: CT CERVICAL SPINE - WITHOUT CONTRAST INDICATION:   Neck trauma (Age >= 65y) fall with headstrike + clavicle fracture  COMPARISON:  None  TECHNIQUE:  CT examination of the cervical spine was performed without intravenous contrast   Contiguous axial images were obtained  Sagittal and coronal reconstructions were performed  Radiation dose length product (DLP) for this visit:  288 46 mGy-cm   This examination, like all CT scans performed in the [facility], was performed utilizing techniques to minimize radiation dose exposure, including the use of iterative  reconstruction and automated exposure control  IMAGE QUALITY:  Diagnostic  FINDINGS: ALIGNMENT:  There is straightening of normal cervical lordosis  No subluxation or compression deformity   VERTEBRAL BODIES:  There is a small fracture of an osteophyte from the left inferior articulating process of the C1 vertebral body  Right C7 transverse process fracture  Right C6 transverse process fracture  Right C7 transverse process fracture    DEGENERATIVE CHANGES:  No significant cervical degenerative changes are noted  PREVERTEBRAL AND PARASPINAL SOFT TISSUES:  Subcutaneous emphysema along the right subclavian vein; has the patient undergone attempted intravenous line placement in this location    THORACIC INLET:  Normal      Impression: There is a small fracture of an osteophyte from the left inferior articulating process of the C1 vertebral body  Right C6 transverse process fracture  Right C7 transverse process fracture     Subcutaneous emphysema along the right subclavian vein; has the patient undergone attempted intravenous line placement in this location    Right clavicle fracture  I personally discussed this study with Dr Rodriguez Bautista on 5/25/2022 at 10:30 PM  Workstation performed: [id number]     DXA bone density spine hip and pelvis    Result Date: 5/17/2022  Narrative: DXA SCAN CLINICAL HISTORY:  78-year-old postmenopausal female  OTHER RISK FACTORS:  Limited mobility  PHARMACOLOGIC THERAPY FOR OSTEOPOROSIS:  None  TECHNIQUE: Bone densitometry was performed using a [employer]   bone densitometer  Regions of interest appear properly placed  COMPARISON: There are no prior DXA studies performed on this unit for comparison  RESULTS: LUMBAR SPINE: Not assessed because  overlying IVC filter and advanced spondylitic changes  Eugene Calhoun LEFT TOTAL HIP: BMD: 0 837  gm/cm2 T-score: -1 4 LEFT FEMORAL NECK: BMD: 0 845  gm/cm2 T score: -1 4 RIGHT TOTAL HIP: BMD:  0 84  gm/cm2 T-score: -1 3 RIGHT FEMORAL NECK: BMD:  0 909  gm/cm2 T score: -0 9 LEFT  FOREARM:  33% RADIUS BMD:  0 561  gm/cm2  T-score: -3 6     Impression: 1  Osteoporosis   2   The 10 year risk of hip fracture is 3 1% with the 10 year risk of major osteoporotic fracture being 11 5% as calculated by the [facility] [place]/WHO fracture risk assessment tool (FRAX)  3   The current NOF guidelines recommend treating patients with a T-score of -2 5 or less in the lumbar spine or hips, or in post-menopausal women and men over the age of 48 with low bone mass (osteopenia) and a FRAX 10 year risk score of >3% for hip fracture and/or >20% for major osteoporotic fracture  4   The NOF recommends follow-up DXA in 1-2 years after initiating therapy for osteoporosis and every 2 years thereafter  More frequent evaluation is appropriate for patients with conditions associated with rapid bone loss, such as glucocorticoid therapy  The interval between DXA screenings may be longer for individuals without major risk factors and initial T-score in the normal or upper low bone mass range  The FRAX algorithm has certain limitations: -FRAX has not been validated in patients currently or previously treated with pharmacotherapy for osteoporosis  In such patients, clinical judgment must be exercised in interpreting FRAX scores  -Prior hip, vertebral and humeral fragility fractures appear to confer greater risk of subsequent fracture than fractures at other sites (this is especially true for individuals with severe vertebral fractures), but quantification of this incremental risk is not possible with FRAX  -FRAX underestimates fracture risk in patients with history of multiple fragility fractures  -FRAX may underestimate fracture risk in patients with history of frequent falls  -It is not appropriate to use FRAX to monitor treatment response   WHO CLASSIFICATION: Normal (a T-score of -1 0 or higher) Low bone mineral density (a T-score of less than -1 0 but higher than -2 5) Osteoporosis (a T-score of -2 5 or less) Severe osteoporosis (a T-score of -2 5 or less with a fragility fracture)  Workstation performed: [id number]     VAS lower limb arterial duplex, complete bilateral    Result Date: 5/16/2022  Narrative:  [facility] REPORT CLINICAL: Indications: Physician wants to determine patency of the arterial system secondary to cold feet and purplish color feet/toes  Operative History: 2015-11-01 IVC Filter CARDIAC ABLATION Risk Factors: The patient has history of Raynaud's Disease, HTN, Diabetes (NIDDM), Hyperlipidemia and CAD  Clinical: Right Pressure:  137/ mm Hg, Left Pressure:  135/ mm Hg  FINDINGS:  Segment                Right       Left                                          PSV (cm/s)  PSV (cm/s)  Common Femoral Artery         114          95  Prox [employer]                  90         101  Prox SFA                       90         101  Mid SFA                        74          95  Dist SFA                       70          81  Proximal Pop                   90          70  Distal Pop                     49          68  Dist Post Tibial               27          72  Dist  Ant  Tibial              85          79  Dist Peroneal                  24          55     CONCLUSION: Impression: RIGHT LOWER LIMB: This resting evaluation shows no evidence of significant lower extremity arterial occlusive disease  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  Ankle/Brachial index: 1 18, which is within normal  (PT: 0 91 mild range) Prior 1 12 PVR/ PPG tracings are normal  Metatarsal pressure of 107 mmHg  Great toe pressure of 85 mmHg, within the healing range  LEFT LOWER LIMB: This resting evaluation shows no evidence of significant lower extremity arterial occlusive disease  Ankle/Brachial index: 1 15,  which is within normal  Prior 1 12 PVR/ PPG tracings are normal  Metatarsal pressure of 122 mmHg  Great toe pressure of 105 mmHg, within the healing range  In comparison to the study on 3/13/2014, there is no significant change in the disease process  SIGNATURE: Electronically Signed by: Concepcion Rosales MD, RPVI on 2022-05-16 04:19:46 PM    EKG, Pathology, and Other Studies: I have personally reviewed pertinent reports        VTE Prophylaxis: Sequential compression device Gia Filler)     Code Status: Level 1 - Full Code  Advance Directive and Living Will:      Power of :    POLST:      Counseling / Coordination of Care  I spent 30 minutes with the patient

## 2022-05-26 NOTE — PLAN OF CARE
Problem: Potential for Falls  Goal: Patient will remain free of falls  Description: INTERVENTIONS:  - Educate patient/family on patient safety including physical limitations  - Instruct patient to call for assistance with activity   - Consult OT/PT to assist with strengthening/mobility   - Keep Call bell within reach  - Keep bed low and locked with side rails adjusted as appropriate  - Keep care items and personal belongings within reach  - Initiate and maintain comfort rounds  - Make Fall Risk Sign visible to staff  - Offer Toileting every 2 Hours, in advance of need  - Initiate/Maintain *bed/chair**alarm  - Obtain necessary fall risk management equipment:   - Apply yellow socks and bracelet for high fall risk patients  - Consider moving patient to room near nurses station  Outcome: Progressing     Problem: MOBILITY - ADULT  Goal: Maintain or return to baseline ADL function  Description: INTERVENTIONS:  -  Assess patient's ability to carry out ADLs; assess patient's baseline for ADL function and identify physical deficits which impact ability to perform ADLs (bathing, care of mouth/teeth, toileting, grooming, dressing, etc )  - Assess/evaluate cause of self-care deficits   - Assess range of motion  - Assess patient's mobility; develop plan if impaired  - Assess patient's need for assistive devices and provide as appropriate  - Encourage maximum independence but intervene and supervise when necessary  - Involve family in performance of ADLs  - Assess for home care needs following discharge   - Consider OT consult to assist with ADL evaluation and planning for discharge  - Provide patient education as appropriate  Outcome: Progressing  Goal: Maintains/Returns to pre admission functional level  Description: INTERVENTIONS:  - Perform BMAT or MOVE assessment daily    - Set and communicate daily mobility goal to care team and patient/family/caregiver     - Collaborate with rehabilitation services on mobility goals if consulted  - Perform Range of Motion **3* times a day  - Reposition patient every *2** hours  - Dangle patient *3** times a day  - Stand patient *3** times a day  - Ambulate patient *3** times a day  - Out of bed to chair **3* times a day   - Out of bed for meals *3** times a day  - Out of bed for toileting  - Record patient progress and toleration of activity level   Outcome: Progressing     Problem: NEUROSENSORY - ADULT  Goal: Achieves stable or improved neurological status  Description: INTERVENTIONS  - Monitor and report changes in neurological status  - Monitor vital signs such as temperature, blood pressure, glucose, and any other labs ordered   - Initiate measures to prevent increased intracranial pressure  - Monitor for seizure activity and implement precautions if appropriate      Outcome: Progressing  Goal: Achieves maximal functionality and self care  Description: INTERVENTIONS  - Monitor swallowing and airway patency with patient fatigue and changes in neurological status  - Encourage and assist patient to increase activity and self care     - Encourage visually impaired, hearing impaired and aphasic patients to use assistive/communication devices  Outcome: Progressing     Problem: RESPIRATORY - ADULT  Goal: Achieves optimal ventilation and oxygenation  Description: INTERVENTIONS:  - Assess for changes in respiratory status  - Assess for changes in mentation and behavior  - Position to facilitate oxygenation and minimize respiratory effort  - Oxygen administered by appropriate delivery if ordered  - Initiate smoking cessation education as indicated  - Encourage broncho-pulmonary hygiene including cough, deep breathe, Incentive Spirometry  - Assess the need for suctioning and aspirate as needed  - Assess and instruct to report SOB or any respiratory difficulty  - Respiratory Therapy support as indicated  Outcome: Progressing     Problem: MUSCULOSKELETAL - ADULT  Goal: Maintain or return mobility to safest level of function  Description: INTERVENTIONS:  - Assess patient's ability to carry out ADLs; assess patient's baseline for ADL function and identify physical deficits which impact ability to perform ADLs (bathing, care of mouth/teeth, toileting, grooming, dressing, etc )  - Assess/evaluate cause of self-care deficits   - Assess range of motion  - Assess patient's mobility  - Assess patient's need for assistive devices and provide as appropriate  - Encourage maximum independence but intervene and supervise when necessary  - Involve family in performance of ADLs  - Assess for home care needs following discharge   - Consider OT consult to assist with ADL evaluation and planning for discharge  - Provide patient education as appropriate  Outcome: Progressing  Goal: Maintain proper alignment of affected body part  Description: INTERVENTIONS:  - Support, maintain and protect limb and body alignment  - Provide patient/ family with appropriate education  Outcome: Progressing     Problem: SKIN/TISSUE INTEGRITY - ADULT  Goal: Skin Integrity remains intact(Skin Breakdown Prevention)  Description: Assess:  -Perform Jaya assessment -Clean and moisturize skin   -Inspect skin when repositioning, toileting, and assisting with ADLS  -Assess under medical devices -Assess extremities for adequate circulation and sensation     Bed Management:  -Have minimal linens on bed & keep smooth, unwrinkled  -Change linens as needed when moist or perspiring  -Avoid sitting or lying in one position for more than 2 hours while in bed  -Keep HOB at **30*degrees     Toileting:  -Offer bedside commode  -Assess for incontinence-Use incontinent care products after each incontinent episode 2 Activity:  -Mobilize patient *3** times a day  -Encourage activity and walks on unit  -Encourage or provide ROM exercises   -Turn and reposition patient every *2** Hours  -Use appropriate equipment to lift or move patient in bed  -Instruct/ Assist with weight shifting every 20min  when out of bed in chair  -Consider limitation of chair time **2  * hour intervals    Skin Care:  -Avoid use of baby powder, tape, friction and shearing, hot water or constrictive clothing  -Relieve pressure over bony prominences-Do not massage red bony areas    Next Steps:  -Teach patient strategies to minimize risks -Consider consults to  interdisciplinary teamsOutcome: Progressing  Goal: Incision(s), wounds(s) or drain site(s) healing without S/S of infection  Description: INTERVENTIONS  - Assess and document dressing, incision, wound bed, drain sites and surrounding tissue  - Provide patient and family education  - Perform skin care/dressing changesOutcome: Progressing  Goal: Pressure injury heals and does not worsen  Description: Interventions:  - Implement low air loss mattress or specialty surface (Criteria met)  - Apply silicone foam dressing  - Instruct/assist with weight shifting every *20** minutes when in chair   - Limit chair time to *2** hour intervals  - Use special pressure reducing interventions  when in chair   - Apply fecal or urinary incontinence containment device   - Perform passive or active ROM - Turn and reposition patient & offload bony prominences- Utilize friction reducing device or surface for transfers   - Consider consults to  interdisciplinary teams- Use incontinent care products after each incontinent episode - Consider nutrition services referral as needed  Outcome: Progressing     Problem: PAIN - ADULT  Goal: Verbalizes/displays adequate comfort level or baseline comfort level  Description: Interventions:  - Encourage patient to monitor pain and request assistance  - Assess pain using appropriate pain scale  - Administer analgesics based on type and severity of pain and evaluate response  - Implement non-pharmacological measures as appropriate and evaluate response  - Consider cultural and social influences on pain and pain management  - Notify physician/advanced practitioner if interventions unsuccessful or patient reports new pain  Outcome: Progressing

## 2022-05-26 NOTE — NURSING NOTE
Called 6 th floor to follow up with this inpatient who had an extravasation of contrast that was given for CT on 5/26/22   Spoke with EPIFANIO Corrales and was informed that site of IV of left  AC is swollen and eccymotic, no blistering, redness, change in sensation, changes in temperature of skin or pain  Patient has consult for PICC placement

## 2022-05-26 NOTE — DISCHARGE INSTRUCTIONS
Jesi Mon was seen today after a fall at home left her with a right clavicle fracture  She needs to wear the sling until she is seen by Orthopedic surgery  Please use ibuprofen or Tylenol for pain relief until she can be seen by Orthopedic surgery  Do not take narcotics while unsupervised  No driving with your sling on  Please come back to the ED if you are having severe pain or difficulty breathing  Discharge Instructions - Orthopedics  Evette Martinez 78 y o  female MRN: [id number]  [facility]    Weight Bearing Status:                                           Nonweightbearing, sling for one week then remove and begin ROM exercises  Use gravity to let your arm hang and way it back and forth for your first exercises  You may let physical therapy guide your movements     DVT prophylaxis  Non required from orthopedic standpoint    Pain:  Continue analgesics as directed    Appt Instructions: If you do not have your appointment, please call the clinic at [phone] to follow up with Dr Joanna Cruz in two weeks    Otherwise followup as scheduled     Contact the office sooner if you experience any increased numbness/tingling in the extremities        Miscellaneous:  F/u 2 weeks from injury for repeat xray

## 2022-05-26 NOTE — ED PROVIDER NOTES
Emergency Department Trauma Note  Alberta Orantes 78 y o  female MRN: [id number]  Unit/Bed#: [facility] 607/[facility] 486-08 Encounter: [id number]      Trauma Alert: Trauma Acuity: C  Model of Arrival: Mode of Arrival: Other (Comment) (son) via    Trauma Team: Current Providers  Attending Provider: Bernard Fermin MD  Attending Provider: Gi Campos DO  Resident: Sandeep Hernandez MD  Registered Nurse: Emory Arreaga RN  Resident: Karolina Torres DO  ED Technician: Berny Okeefe  Registered Nurse: Sharon Nichols RN  ED Technician: Yuliya Benitez  Consulting Physician: Raoul Nathan DO  Registered Nurse: Emory Arreaga RN  Registered Nurse: Jovon Campbell RN  Registered Nurse: Apple Garcias RN  Consulting Physician: David Hackett MD  Patient Care Technician: Yanet Sinclair  Speech Language Pathologist: Katelynn Rosario SLP  Unit Clerk: Velma Hamman  Registered Nurse: Apple Garcias RN  Registered Nurse: Alaina Dillon RN  Patient Care Assistant: Kathy Davis  Respiratory Therapist: Marc Norris, RT  Registered Nurse: Tom Evans RN  Patient Care Assistant: Tee Jaquez  Registered Nurse: Roddy Gerber RN  Consulting Physician: Hitesh Villarreal DO  Nursing Student: [facility]  Nursing Student: [facility]  Nursing Student: [facility]  Nursing Student: Nadir Santiago  Respiratory Therapist: Chacha Damico RT  Registered Nurse: Bakari Parada  Patient Care Assistant: Moreno Body  Patient Care Assistant: Deloris Dan  Occupational Therapist: Allan Easley OT  Physical Therapist: Mimi Brooks, RUBEN  Patient Care Technician: Yanet Sinclair  Registered Nurse: Alexandria Zee RN  Patient Care Assistant: Kathy Davis  Consulting Physician: Allie Sparks MD  Consultants:     None      History of Present Illness     Chief Complaint:   Chief Complaint   Patient presents with    Fall     Pt was walking up the stairs and fell down about 5-6 steps   Currently having R shoulder/arm pain   + head strike, + ASA     HPI:  Casey Garza is a 78 y o  female who presents with R shoulder pain  Mechanism:Details of Incident: Pt reports falling while carrying stuff upstairs  +headstrike on wall at bottom of steps  +aspirin  + pain in right arm/shoulder Injury Date: 05/25/22 Injury Time: 36      78year-old female states that she was attempting to ascend stairs at her home around 5:00 p m  This evening when she fell backwards hitting her head first on the wall and then "sliding" down several stairs dragging her right upper extremity behind her causing a "pop" in her right shoulder  She is denying any loss of consciousness, states that she remembers the event, but states that it was a mechanical fall as she was attempting to climb the stairs with a rake the got caught on the step  She notes that after the incident she was calling her son to come get her however it took him a while to respond which is why she took several hours to come to the hospital   She is denying any neurologic symptoms whatsoever including weakness, numbness, tingling, change in vision, trouble swallowing, change in speech, or headache  She is denying any prior injuries to the right clavicle, but notes significant pain over an area of obvious deformity in the right clavicle  She is denying any shortness of breath  Patient states she walks with a walker at home, patient was ambulated here as part of exam and ambulated at her reported baseline  Review of Systems   Constitutional: Negative for chills and fever  HENT: Negative for ear pain and sore throat  Eyes: Negative for pain and visual disturbance  Respiratory: Negative for cough and shortness of breath  Cardiovascular: Negative for chest pain and palpitations  Gastrointestinal: Negative for abdominal pain and vomiting  Genitourinary: Negative for dysuria and hematuria     Musculoskeletal: Positive for arthralgias  Negative for back pain  Skin: Negative for color change and rash  Neurological: Negative for seizures and syncope  All other systems reviewed and are negative        Historical Information     Immunizations:   Immunization History   Administered Date(s) Administered    COVID-19 PFIZER VACCINE 0 3 ML IM 02/23/2021, 03/17/2021    Influenza Split High Dose Preservative Free IM 10/14/2015, 10/21/2016, 10/30/2017    Influenza, high dose seasonal 0 7 mL 11/06/2018, 01/18/2021, 09/02/2021    Influenza, seasonal, injectable 11/20/2012    Pneumococcal Conjugate 13-Valent 10/14/2015    Pneumococcal Polysaccharide PPV23 01/01/2007, 04/27/2017       Past Medical History:   Diagnosis Date    CTS (carpal tunnel syndrome)     unspecified laterality    D[facility]deep venous thrombosis) ([facility])     left leg, s/p IVC filter 11/2015    Endometriosis     Fibromyalgia     Fibromyalgia, primary     Foot drop, left foot     Hypercholesteremia     Hypertension     Hypothyroidism     Lyme disease     treated 8/2015    Migraine     Migraine     Neurogenic claudication due to lumbar spinal stenosis     Osteoarthritis     PAF (paroxysmal atrial fibrillation) ([facility])     s/p ablation [facility] (sees [facility] Rea Higgins at [facility])   Frances Familia Peripheral neuropathy     Raynaud's syndrome without gangrene     Sjogren's syndrome ([facility] [address] )     Solitary pulmonary nodule on lung CT        Family History   Problem Relation Age of Onset    Heart disease Father     ADD / ADHD Father     Stroke Father     Cancer Brother         prostate    Heart attack Maternal Grandmother         acute MI    Cancer Family      Past Surgical History:   Procedure Laterality Date    ABDOMINAL HYSTERECTOMY      APPENDECTOMY      ATRIAL ABLATION SURGERY      cath    BREAST SURGERY Left     puncture aspiration of cyst onset 1972    CHOLECYSTECTOMY      onset 1981    COLONOSCOPY      fiberoptic    FOOT SURGERY Bilateral     onset 1993- removal of mortons neuroma    HAND SURGERY      gaglion cyst removal    HAND SURGERY      onset 1991 - carpal tunnel    IVC FILTER INSERTION      KNEE ARTHROSCOPY Left     therapeutic     LYMPH NODE BIOPSY      1996 onset    AL ARTHRODESIS POSTERIOR/POSTEROLATERAL LUMBAR N/A 3/29/2016    Procedure: L2-S1 LAMINECTOMY;  Surgeon: Belem Tavarez DO;  [facility];  Service: Orthopedics    TOTAL ABDOMINAL HYSTERECTOMY      onset 1989     Social History     Tobacco Use    Smoking status: Never Smoker    Smokeless tobacco: Never Used   Substance Use Topics    Alcohol use: Yes     Comment: per pt occassional GLASS OF WINE    Drug use: No     E-Cigarette/Vaping     E-Cigarette/Vaping Substances       Family History: non-contributory    Meds/Allergies   Prior to Admission Medications   Prescriptions Last Dose Informant Patient Reported? Taking? Cholecalciferol (VITAMIN D3) 5000 units CAPS  Self Yes No   Sig: Take 1,000 Units by mouth daily    DULoxetine (CYMBALTA) 30 mg delayed release capsule  Self No No   Sig: Take 3 capsules (90 mg dose) daily  Patient not taking: Reported on 3/7/2022    DULoxetine (CYMBALTA) 60 mg delayed release capsule   No No   Sig: Take 1 capsule (60 mg total) by mouth daily   Menthol 5 4 MG LOZG  Self No No   Sig: Take 1 lozenge every 2 hours as needed  Multiple Vitamin (multivitamin) tablet  Self Yes No   Sig: Take 1 tablet by mouth daily   aspirin (ECOTRIN LOW STRENGTH) 81 mg EC tablet  Self Yes No   Sig: Take 81 mg by mouth daily   chlorhexidine (PERIDEX) 0 12 % solution  Self Yes No   Sig: RINSE MOUTH WITH 15 ML (1 CAPFUL) FOR 30 SECONDS IN MORNING AND E   (REFER TO PRESCRIPTION NOTES)  ezetimibe (ZETIA) 10 mg tablet  Self Yes No   Sig: Take 10 mg by mouth daily     gabapentin (NEURONTIN) 300 mg capsule  Self No No   Sig: TAKE 1 CAPSULE BY MOUTH 3  TIMES DAILY   Patient not taking: Reported on 3/2/2022    levothyroxine 88 mcg tablet  Self No No   Sig: TAKE 1 TABLET BY MOUTH DAILY   lisinopril (ZESTRIL) 5 mg tablet  Self Yes No   Sig: Take 5 mg by mouth 2 (two) times a day   topiramate (TOPAMAX) 50 MG tablet  Self No No   Sig: TAKE 1 TABLET BY MOUTH  DAILY      Facility-Administered Medications: None       Allergies   Allergen Reactions    Betaine     Cranberry Extract - Food Allergy     Cranberry Juice Powder - Food Allergy     Latex      Other reaction(s): Rash and itching    Soy Isoflavones      Other reaction(s): Itching    Soybean-Containing Drug Products - Food Allergy     Voltaren [Diclofenac Sodium]        PHYSICAL EXAM    PE limited by: None    Objective   Vitals:   First set: Temperature: (!) 97 4 °F (36 3 °C) (05/25/22 1949)  Pulse: 66 (05/25/22 1949)  Respirations: 18 (05/25/22 1949)  Blood Pressure: 161/85 (05/25/22 1949)  SpO2: 96 % (05/25/22 1949)    Primary Survey:   (A) Airway:  Intact  (B) Breathing:  Bilateral breath sounds  (C) Circulation: Pulses:   pedal  2/4 and femoral  2/4  (D) Disabliity:  GCS Total:  15  (E) Expose:  Completed    Secondary Survey: (Click on Physical Exam tab above)  Physical Exam  Vitals and nursing note reviewed  Constitutional:       General: She is not in acute distress  Appearance: She is well-developed and normal weight  She is not ill-appearing  HENT:      Head: Normocephalic and atraumatic  Right Ear: Tympanic membrane and external ear normal  There is no impacted cerumen  Left Ear: Tympanic membrane and external ear normal  There is no impacted cerumen  Nose: Nose normal  No congestion or rhinorrhea  Mouth/Throat:      Mouth: Mucous membranes are moist       Pharynx: Oropharynx is clear  No oropharyngeal exudate or posterior oropharyngeal erythema  Eyes:      General: No scleral icterus  Extraocular Movements: Extraocular movements intact  Conjunctiva/sclera: Conjunctivae normal       Pupils: Pupils are equal, round, and reactive to light     Cardiovascular:      Rate and Rhythm: Normal rate and regular rhythm  Pulses: Normal pulses  Heart sounds: Normal heart sounds  No murmur heard  No friction rub  No gallop  Pulmonary:      Effort: Pulmonary effort is normal  No respiratory distress  Breath sounds: Normal breath sounds  No wheezing, rhonchi or rales  Abdominal:      General: Abdomen is flat  There is no distension  Palpations: Abdomen is soft  Tenderness: There is no abdominal tenderness  There is no right CVA tenderness, left CVA tenderness or guarding  Genitourinary:     General: Normal vulva  Rectum: Normal    Musculoskeletal:         General: Tenderness, deformity (R clavicle) and signs of injury present  No swelling  Cervical back: Neck supple  No rigidity  Right lower leg: No edema  Left lower leg: No edema  Lymphadenopathy:      Cervical: No cervical adenopathy  Skin:     General: Skin is warm and dry  Capillary Refill: Capillary refill takes less than 2 seconds  Coloration: Skin is not jaundiced  Findings: Bruising (2cm echymosis on occiput) present  No rash  Neurological:      General: No focal deficit present  Mental Status: She is alert and oriented to person, place, and time  Mental status is at baseline  Sensory: No sensory deficit  Motor: No weakness  Gait: Gait normal       Deep Tendon Reflexes: Reflexes normal    Psychiatric:         Mood and Affect: Mood normal          Behavior: Behavior normal          Cervical spine cleared by clinical criteria?  No (imaging required)      Invasive Devices  Report    Peripherally Inserted Central Catheter Line  Duration           PICC Line 05/26/22 Left Brachial 1 day                Lab Results:   Results Reviewed     Procedure Component Value Units Date/Time    Basic metabolic panel [[id number]]  (Abnormal) Collected: 05/26/22 0440    Lab Status: Final result Specimen: Blood from Hand, Left Updated: 05/26/22 0512     Sodium 137 mmol/L      Potassium 4 2 mmol/L      Chloride 108 mmol/L      CO2 26 mmol/L      ANION GAP 3 mmol/L      BUN 21 mg/dL      Creatinine 0 70 mg/dL      Glucose 170 mg/dL      Calcium 9 0 mg/dL      eGFR 79 ml/min/1 73sq m     Narrative:      [place] guidelines for Chronic Kidney Disease (CKD):     Stage 1 with normal or high GFR (GFR > 90 mL/min/1 73 square meters)    Stage 2 Mild CKD (GFR = 60-89 mL/min/1 73 square meters)    Stage 3A Moderate CKD (GFR = 45-59 mL/min/1 73 square meters)    Stage 3B Moderate CKD (GFR = 30-44 mL/min/1 73 square meters)    Stage 4 Severe CKD (GFR = 15-29 mL/min/1 73 square meters)    Stage 5 End Stage CKD (GFR <15 mL/min/1 73 square meters)  Note: GFR calculation is accurate only with a steady state creatinine    CBC and differential [[id number]]  (Abnormal) Collected: 05/26/22 0440    Lab Status: Final result Specimen: Blood from Hand, Left Updated: 05/26/22 0456     WBC 8 98 Thousand/uL      RBC 3 96 Million/uL      Hemoglobin 12 0 g/dL      Hematocrit 37 3 %      MCV 94 fL      MCH 30 3 pg      MCHC 32 2 g/dL      RDW 13 2 %      MPV 9 9 fL      Platelets 282 Thousands/uL      nRBC 0 /100 WBCs      Neutrophils Relative 88 %      Immat GRANS % 0 %      Lymphocytes Relative 4 %      Monocytes Relative 8 %      Eosinophils Relative 0 %      Basophils Relative 0 %      Neutrophils Absolute 7 84 Thousands/µL      Immature Grans Absolute 0 03 Thousand/uL      Lymphocytes Absolute 0 38 Thousands/µL      Monocytes Absolute 0 70 Thousand/µL      Eosinophils Absolute 0 02 Thousand/µL      Basophils Absolute 0 01 Thousands/µL     Basic metabolic panel [[id number]]  (Abnormal) Collected: 05/25/22 2341    Lab Status: Final result Specimen: Blood from Arm, Left Updated: 05/26/22 0011     Sodium 138 mmol/L      Potassium 4 4 mmol/L      Chloride 106 mmol/L      CO2 27 mmol/L      ANION GAP 5 mmol/L      BUN 25 mg/dL      Creatinine 0 74 mg/dL      Glucose 157 mg/dL      Calcium 9 3 mg/dL eGFR 74 ml/min/1 73sq m     Narrative:      [place] guidelines for Chronic Kidney Disease (CKD):     Stage 1 with normal or high GFR (GFR > 90 mL/min/1 73 square meters)    Stage 2 Mild CKD (GFR = 60-89 mL/min/1 73 square meters)    Stage 3A Moderate CKD (GFR = 45-59 mL/min/1 73 square meters)    Stage 3B Moderate CKD (GFR = 30-44 mL/min/1 73 square meters)    Stage 4 Severe CKD (GFR = 15-29 mL/min/1 73 square meters)    Stage 5 End Stage CKD (GFR <15 mL/min/1 73 square meters)  Note: GFR calculation is accurate only with a steady state creatinine    Protime-INR [[id number]]  (Normal) Collected: 05/25/22 2341    Lab Status: Final result Specimen: Blood from Arm, Left Updated: 05/26/22 0011     Protime 13 4 seconds      INR 1 07    CBC and differential [[id number]]  (Abnormal) Collected: 05/25/22 2341    Lab Status: Final result Specimen: Blood from Arm, Left Updated: 05/25/22 2359     WBC 11 08 Thousand/uL      RBC 4 06 Million/uL      Hemoglobin 12 2 g/dL      Hematocrit 38 7 %      MCV 95 fL      MCH 30 0 pg      MCHC 31 5 g/dL      RDW 13 2 %      MPV 10 0 fL      Platelets 324 Thousands/uL      nRBC 0 /100 WBCs      Neutrophils Relative 86 %      Immat GRANS % 1 %      Lymphocytes Relative 6 %      Monocytes Relative 6 %      Eosinophils Relative 1 %      Basophils Relative 0 %      Neutrophils Absolute 9 60 Thousands/µL      Immature Grans Absolute 0 06 Thousand/uL      Lymphocytes Absolute 0 61 Thousands/µL      Monocytes Absolute 0 69 Thousand/µL      Eosinophils Absolute 0 10 Thousand/µL      Basophils Absolute 0 02 Thousands/µL                  Imaging Studies:   Direct to CT: No  CTA neck w wo contrast   Final Result by Gilbert Stephens MD (05/27 4430)      Irregularity and mural thrombus along the mid right subclavian artery is new since the prior examination and in the region of the right clavicular fracture    Findings are suspicious for mild vascular injury versus possibly atherosclerotic disease  No evidence for cervical vascular dissection in the region of the previously described fracture sites  The study was marked in [facility] for immediate notification  Workstation performed: [id number]         CT chest abdomen pelvis wo contrast   Final Result by James Obregon DO (05/26 7585)      Comminuted fracture of the right clavicular midshaft  Mildly displaced fracture of the anterior right 1st rib  Nondisplaced fracture of the right scapula (axial image 4, series 2)  Nondisplaced fracture of the right C7 transverse process  Associated    soft tissue swelling in the right chest and supraclavicular region  Small right pleural effusion noted dependently  Large amount of contrast extravasation in the left upper extremity  Pleural-based lesion in the periphery of the left lower lung field measures approximately 2 5 x 1 3 cm in size, increased from the prior  Follow-up PET/CT and/or tissue sampling may be considered at the discretion of the referring caregiver  Subcentimeter nodules in the lungs, as described  Based on current Fleischner Society 2017 Guidelines on incidental pulmonary nodule, no routine follow-up is needed if the patient is low risk  If the patient is high risk, optional follow-up chest CT at    12 months can be considered  Coronary atherosclerosis, left renal cyst, colonic diverticulosis without evidence of acute diverticulitis, and other findings as above  Workstation performed: [id number]         CT head wo contrast   Final Result by Capo Rodriguez MD (05/25 2214)      No acute intracranial abnormality  Workstation performed: [id number]         CT spine cervical without contrast   Final Result by Capo Rodriguez MD (05/25 2230)      There is a small fracture of an osteophyte from the left inferior articulating process of the C1 vertebral body  Right C6 transverse process fracture     Right C7 transverse process fracture          Subcutaneous emphysema along the right subclavian vein; has the patient undergone attempted intravenous line placement in this location         Right clavicle fracture  I personally discussed this study with Dr Tracey Merida on 5/25/2022 at 10:30 PM                Workstation performed: [id number]         XR clavicle RIGHT   ED Interpretation by Cecilio Shah MD (05/25 2107)   Displaced R clavicle fracture        Final Result by Lindsay Garrido MD (05/26 7637)      Comminuted fracture of the mid right clavicle  Workstation performed: [id number]               Procedures  ECG 12 Lead Documentation Only    Date/Time: 5/25/2022 8:08 PM  Performed by: Cecilio Shah MD  Authorized by: Cecilio Shah MD     Indications / Diagnosis:  Fall  ECG reviewed by me, the ED Provider: yes    Patient location:  ED  Previous ECG:     Previous ECG:  Compared to current    Comparison ECG info:  7/24/15    Similarity:  Changes noted    Comparison to cardiac monitor: Yes    Interpretation:     Interpretation: normal    Rate:     ECG rate:  60    ECG rate assessment: normal    Rhythm:     Rhythm: sinus rhythm    Ectopy:     Ectopy: none    QRS:     QRS axis:  Normal  ST segments:     ST segments:  Normal  T waves:     T waves: normal               ED Course  ED Course as of 05/27/22 1725   Wed May 25, 2022   2124 Patient confirmed to be at her baseline by son Toñito Zimmerman who brought her to the emergency department  He states that her only complaint to him was her right shoulder otherwise she was acting normally and appropriately             MDM  Number of Diagnoses or Management Options  Clavicle fracture  Closed fracture of one rib of right side, initial encounter  Closed fracture of right scapula, unspecified part of scapula, initial encounter  Closed nondisplaced fracture of first cervical vertebra, unspecified fracture morphology, initial encounter ([facility] [address] )  Frailty  Injury of right subclavian artery, initial encounter  Diagnosis management comments:   CC:  Fall with head strike on aspirin, fractured clavicle    Impression:  Need stat CT head via trauma protocol for fall and head strike  Will include CT C-spine due to distracting injury, unable to clear using nexus rules  Will add on imaging of the clavicle  Patient will need sling with orthopedic follow-up due to right clavicle fracture at a minimum  If anything abnormal shows up on patient's CT scan then patient will need admission to trauma  Further imaging may be required of patients shoulder if unable to visualize scapula on right side  Reassessment/disposition:  Patient has multiple cervical spine fractures as well as other fractures on CT scan  Patient needs trauma admission  Further workup guided by trauma team   Patient is stable at time of transfer to trauma service care            Disposition  [facility] Transfer: No  Final diagnoses:   Clavicle fracture   Closed nondisplaced fracture of first cervical vertebra, unspecified fracture morphology, initial encounter [facility])   Frailty   Closed fracture of one rib of right side, initial encounter   Closed fracture of right scapula, unspecified part of scapula, initial encounter   Injury of right subclavian artery, initial encounter     Time reflects when diagnosis was documented in both MDM as applicable and the Disposition within this note     Time User Action Codes Description Comment    5/25/2022  9:25 PM Toña Credit Add [S42 009A] Clavicle fracture     5/26/2022  3:09 AM Guerda GIRALDO Add [S12 001A] Closed nondisplaced fracture of first cervical vertebra, unspecified fracture morphology[facility]     5/26/2022  4:10 AM Guerda GIRALDO Add [R54] Frailty     5/26/2022  4:13 AM Rosemarie Leon Add [S22 31XA] Closed fracture of one rib of right side, initial encounter     5/26/2022  4:13 AM Guerda Knight T Add [S42 101A] Closed fracture of right scapula, unspecified part of scapula, initial encounter     5/27/2022  4:57 PM Brandt Finger Modify [S42 009A] Clavicle fracture     5/27/2022  4:57 PM Aletta Ridge T Modify [S12 001A] Closed nondisplaced fracture of first cervical vertebra, unspecified fracture morphology, initial encounter ([facility] [address] )     5/27/2022  4:57 PM Brandt Finger Modify [R54] Frailty     5/27/2022  4:57 PM Brandt Finger Modify [S22 31XA] Closed fracture of one rib of right side, initial encounter     5/27/2022  4:57 PM Brandt Finger Modify [S42 101A] Closed fracture of right scapula, unspecified part of scapula, initial encounter     5/27/2022  4:57 PM Brandt Finger Add [S25 101A] Injury of right subclavian artery, initial encounter       ED Disposition     ED Disposition   Admit    Condition   Stable    Date/Time   Thu May 26, 2022  1:38 AM    Comment   Case was discussed with Trauma and the patient's admission status was agreed to be Admission Status: inpatient status to the service of Dr Matthew Zimmerman              Follow-up Information     Follow up With Specialties Details Why Contact Info Additional Information    Kristina Sparks MD Family Medicine In 3 days  [facility] [address] [facility]  [phone]       [address] [facility] Orth[facility] Surgery   [address] [address]  [phone] 30 [facility], [address] [place], [place], [address]  Use Entrance A     [address] [place] [address] Emergency Department Emergency Medicine Go to  If symptoms worsen, As needed [address] Emergency Department, [address], [place], [facility]    [facility] 34 Neurosurgery Follow up As needed [address] [facility] [address]  [facility] Neurosurgical [address], [address] [facility] [place], [place],  Orthopedic Surgery, Pediatric Orthopedic Surgery Schedule an appointment as soon as possible for a visit in 2 week(s)  [address] [place] [address]  [phone]           Current Discharge Medication List      CONTINUE these medications which have NOT CHANGED    Details   aspirin (ECOTRIN LOW STRENGTH) 81 mg EC tablet Take 81 mg by mouth daily      chlorhexidine (PERIDEX) 0 12 % solution RINSE MOUTH WITH 15 ML (1 CAPFUL) FOR 30 SECONDS IN MORNING AND E   (REFER TO PRESCRIPTION NOTES)  Refills: 0      Cholecalciferol (VITAMIN D3) 5000 units CAPS Take 1,000 Units by mouth daily       !! DULoxetine (CYMBALTA) 30 mg delayed release capsule Take 3 capsules (90 mg dose) daily  Qty: 30 capsule, Refills: 0      !! DULoxetine (CYMBALTA) 60 mg delayed release capsule Take 1 capsule (60 mg total) by mouth daily  Qty: 90 capsule, Refills: 1    Associated Diagnoses: Fibromyalgia      ezetimibe (ZETIA) 10 mg tablet Take 10 mg by mouth daily  gabapentin (NEURONTIN) 300 mg capsule TAKE 1 CAPSULE BY MOUTH 3  TIMES DAILY  Qty: 270 capsule, Refills: 3    Comments: Requesting 1 year supply  Associated Diagnoses: Polyneuropathy      levothyroxine 88 mcg tablet TAKE 1 TABLET BY MOUTH  DAILY  Qty: 90 tablet, Refills: 3    Comments: Requesting 1 year supply  Associated Diagnoses: Hypothyroidism, unspecified type      lisinopril (ZESTRIL) 5 mg tablet Take 5 mg by mouth 2 (two) times a day  Refills: 0      Menthol 5 4 MG LOZG Take 1 lozenge every 2 hours as needed  Refills: 0      Multiple Vitamin (multivitamin) tablet Take 1 tablet by mouth daily      topiramate (TOPAMAX) 50 MG tablet TAKE 1 TABLET BY MOUTH  DAILY  Qty: 90 tablet, Refills: 3    Comments: Requesting 1 year supply  Associated Diagnoses: Migraine       !! - Potential duplicate medications found  Please discuss with provider  PDMP Review       Value Time User    PDMP Reviewed  Yes 5/26/2022  2:43 PM Linda Valentin PA-C          ED Provider  Electronically Signed by         Ti Hart MD  05/27/22 (2) [phone]

## 2022-05-26 NOTE — PROCEDURES
Insert PICC line    Date/Time: 5/26/2022 11:50 AM  Performed by: Car Abernathy RN  Authorized by: Catrachito Rudd MD     Patient location:  Bedside  Other Assisting Provider: Yes (comment) (NORA Carlin)    Consent:     Consent obtained:  Written    Consent given by:  Patient    Risks discussed:  Arterial puncture, bleeding, incorrect placement, infection, nerve damage and pneumothorax (Discussed by provider)    Alternatives discussed: Discussed by provider  Universal protocol:     Procedure explained and questions answered to patient or proxy's satisfaction: yes      Relevant documents present and verified: yes      Test results available and properly labeled: yes      Radiology Images displayed and confirmed  If images not available, report reviewed: yes      Required blood products, implants, devices, and special equipment available: yes      Site/side marked: yes      Immediately prior to procedure, a time out was called: yes      Patient identity confirmed:  Verbally with patient, arm band, provided demographic data and hospital-assigned identification number  Pre-procedure details:     Hand hygiene: Hand hygiene performed prior to insertion      Sterile barrier technique: All elements of maximal sterile technique followed      Skin preparation:  ChloraPrep    Skin preparation agent: Skin preparation agent completely dried prior to procedure    Indications:     PICC line indications: no peripheral vascular access    Sedation:     Sedation type: None  Anesthesia (see MAR for exact dosages):      Anesthesia method:  Local infiltration    Local anesthetic:  Lidocaine 1% w/o epi (3 mL administered)  Procedure details:     Location:  Brachial    Vessel type: vein      Laterality:  Left    Site selection rationale:  Largest, most patent vessel    Approach: percutaneous technique used      Patient position:  Flat    Procedural supplies:  Double lumen    Catheter size:  5 Fr    Landmarks identified: yes      Ultrasound guidance: yes      Ultrasound image availability:  Not saved (Catheter is 37% of vessel diameter)    Sterile ultrasound techniques: Sterile gel and sterile probe covers were used      Number of attempts:  1    Successful placement: yes      Vessel of catheter tip end:  Sherlock 3CG confirmed    Total catheter length (cm):  44    Catheter out on skin (cm):  0    Max flow rate:  999 ml/hr    Arm circumference:  31  Post-procedure details:     Post-procedure:  Dressing applied and securement device placed    Assessment:  Free fluid flow and blood return through all ports    Post-procedure complications: none      Patient tolerance of procedure:   Tolerated well, no immediate complications

## 2022-05-26 NOTE — ASSESSMENT & PLAN NOTE
Patient with reported fall down 5 stairs at home; mechanical fall  Cervical ct non contrast- impression-   "Right clavicle fracture "  Inpatient consult to acute pain service  PICC line placed; consent form completed and filed  Multi modal pain medication regimen engaged  Right arm sling in place  Orthopedics evaluation and treatment; with non operative management, pain control, right arm sling  Appreciate APS recommendations

## 2022-05-26 NOTE — CONSULTS
Consultation- Orthopedics   Abdifatah Ballard 78 y o  female MRN: [id number]  Unit/Bed#: [facility] 698-36      Chief Complaint:   right shoulder pain and neck pain     HPI:   78 y o  RHD retired female status post fall down five stairs at home yesterday, after she lost her balance, complaining of right shoulder pain  She reports head strike but no LOC  She was evaluated in the ED as a level C trauma and admitted to the trauma service for cervical spine injuries and right shoulder injuries  Orthopaedic surgery was consulted for a right clavicle fracture and a right scapular fracture reported on CT scan of the chest  She denies numbness or tingling to her right upper extremity         Review Of Systems:   · Skin: Normal  · Neuro: See HPI  · Musculoskeletal: See HPI  · 14 point review of systems negative except as stated above     Past Medical History:   Past Medical History:   Diagnosis Date    CTS (carpal tunnel syndrome)     unspecified laterality    D[facility])     left leg, s/p IVC filter 11/2015    Endometriosis     Fibromyalgia     Fibromyalgia, primary     Foot drop, left foot     Hypercholesteremia     Hypertension     Hypothyroidism     Lyme disease     treated 8/2015    Migraine     Migraine     Neurogenic claudication due to lumbar spinal stenosis     Osteoarthritis     [facility]     [facility] (se[facility] Francesca Jacques [facility])   Norma Splinter Peripheral neuropathy     Raynaud's syndrome without gangrene     [facility]'s [facility]     Solitary pulmonary nodule on lung CT        Past Surgical History:   Past Surgical History:   Procedure Laterality Date    ABDOMINAL HYSTERECTOMY      APPENDECTOMY      ATRIAL ABLATION SURGERY      cath    BREAST SURGERY Left     puncture aspiration of cyst onset 1972    CHOLECYSTECTOMY      onset 1981    COLONOSCOPY      fiberoptic    FOOT SURGERY Bilateral     onset 1993- removal of mortons neuroma    HAND SURGERY      gaglion cyst removal    HAND SURGERY      onset 1991 - carpal tunnel    IVC FILTER INSERTION      KNEE ARTHROSCOPY Left     therapeutic     LYMPH NODE BIOPSY      1996 onset    NM ARTHRODESIS POSTERIOR/POSTEROLATERAL LUMBAR N/A 3/29/2016    Procedure: L2-S1 LAMINECTOMY;  Surgeon: Gisselle Ross DO;  [facility]: [facility];  Service: Orthopedics    TOTAL ABDOMINAL HYSTERECTOMY      onset [facility]       Family History:  Family history reviewed and non-contributory  Family History   Problem Relation Age of Onset    Heart disease Father    Angelika Elizabeth ADD / ADHD Father     Stroke Father     Cancer Brother         prostate    Heart attack Maternal Grandmother         acute MI    Cancer Family        Social History:  Social History     Socioeconomic History    Marital status: /Civil Union     Spouse name: None    Number of children: None    Years of education: None    Highest education level: None   Occupational History    None   Tobacco Use    Smoking status: Never Smoker    Smokeless tobacco: Never Used   Substance and Sexual Activity    Alcohol use: No    Drug use: No    Sexual activity: None   Other Topics Concern    None   Social History Narrative    None     Social Determinants of Health     Financial Resource Strain: Not on file   Food Insecurity: Not on file   Transportation Needs: Not on file   Physical Activity: Not on file   Stress: Not on file   Social Connections: Not on file   Intimate Partner Violence: Not on file   Housing Stability: Not on file       Allergies:    Allergies   Allergen Reactions    Betaine     Cranberry Extract - Food Allergy     Cranberry Juice Powder - Food Allergy     Latex      Other reaction(s): Rash and itching    Soy Isoflavones      Other reaction(s): Itching    Soybean-Containing Drug Products - Food Allergy     Voltaren [Diclofenac Sodium]            Labs:  0   Lab Value Date/Time    HCT 37 3 05/26/2022 0440    HCT 38 7 05/25/2022 2341 HCT 40 2 04/15/2021 1222    HCT 39 2 01/03/2019 1142    HCT 38 9 06/02/2017 0951    HCT 26 1 (L) 04/11/2016 0636    HCT 37 1 12/18/2015 1935    HCT 38 4 10/27/2015 1550    HGB 12 0 05/26/2022 0440    HGB 12 2 05/25/2022 2341    HGB 13 0 04/15/2021 1222    HGB 12 9 01/03/2019 1142    HGB 12 9 06/02/2017 0951    HGB 8 4 (L) 04/11/2016 0636    HGB 12 5 12/18/2015 1935    HGB 12 1 10/27/2015 1550    INR 1 07 05/25/2022 2341    INR 1 10 10/27/2015 1550    WBC 8 98 05/26/2022 0440    WBC 11 08 (H) 05/25/2022 2341    WBC 5 5 04/15/2021 1222    WBC 5 1 01/03/2019 1142    WBC 4 4 06/02/2017 0951    WBC 5 70 04/11/2016 0636    WBC 6 01 12/18/2015 1935    WBC 5 55 10/27/2015 1550    ESR 5 12/18/2015 1935       Meds:    Current Facility-Administered Medications:     acetaminophen (TYLENOL) tablet 975 mg, 975 mg, Oral, Q8H [address], Cayla Martinez MD    enoxaparin (LOVENOX) subcutaneous injection 30 mg, 30 mg, Subcutaneous, Q12H, Cayla Martinez MD    HYDROmorphone HCl (DILAUDID) injection 0 2 mg, 0 2 mg, Intravenous, Q1H PRN, Cayla Martinez MD    lidocaine (LIDODERM) 5 % patch 1 patch, 1 patch, Topical, Daily, Cayla Martinez MD    oxyCODONE (ROXICODONE) IR tablet 2 5 mg, 2 5 mg, Oral, Q4H PRN, Cayla Martinez MD, 2 5 mg at 05/26/22 0446    oxyCODONE (ROXICODONE) IR tablet 5 mg, 5 mg, Oral, Q4H PRN, Cayla Martinez MD    Blood Culture:   No results found for: BLOODCX    Wound Culture:   No results found for: WOUNDCULT    Ins and Outs:  No intake/output data recorded  Physical Exam:   /58   Pulse 77   Temp 97 8 °F (36 6 °C) (Oral)   Resp 18   Wt 60 3 kg (133 lb)   SpO2 97%   BMI 21 15 kg/m²   Gen: Alert and oriented to person, place, time  HEENT: EOMI, eyes clear, moist mucus membranes, hearing intact  Respiratory: Bilateral chest rise   No audible wheezing found  Cardiovascular: Regular Rate and Rhythm  Abdomen: soft nontender/nondistended  Musculoskeletal: right upper extremity  · Skin intact mild ecchymosis noted over shoulder  No swelling  No tenting  · Tender to palpation over clavicle  · Tender to palpation over scapular spine   · Sensation intact to axillary, median, radial, and ulnar nerve distributions  · Motor intact to median, radial, and ulnar nerve distributions  · 2+ radial pulse    Radiology:   I personally reviewed the films  X-rays right clavicle shows midshaft clavicle fracture that is shortened with less than 100 percent displacement    CT of chest demonstrates midshaft clavicle fracture with comminution    _*_*_*_*_*_*_*_*_*_*_*_*_*_*_*_*_*_*_*_*_*_*_*_*_*_*_*_*_*_*_*_*_*_*_*_*_*_*_*_*_*    Assessment:  78 y  o female S/P fall off five stairs with right clavicle fracture and right scapular spine tenderness  This injury can be managed nonoperatively in a sling with ROM progression starting in one week     Plan:   · NWB right upper extremity in sling  · Analgesics for pain  · PT/OT  · Body mass index is 21 15 kg/m²  · Recommend behavior modifications, nutrition and physical activity    · Dispo: Ortho will follow  · Case seen in conjunction with senior resident on call      Justin Franco MD

## 2022-05-26 NOTE — ED NOTES
Pt IV infiltratesd, ice applied  Dr Kay Huynh made aware and IV was taken out        Ollie De Santiago, RN  05/26/22 2779

## 2022-05-26 NOTE — ASSESSMENT & PLAN NOTE
Patient reports fall down 5 stairs approximately  Head CT negative  Cervical spine CT demonstrates small fracture of the osteophyte of from the left anterior articular in process of the C1 vertebral body, and C6 and C7 right transverse process fractures; right clavicle fracture  Patient self ambulatory with no difficulty; lower extremities with no pain  APS with evaluation treatment; appreciate recommendations

## 2022-05-26 NOTE — ASSESSMENT & PLAN NOTE
Patient reports fall down 5 stairs proximally, mechanical fall  CT chest abdomen pelvis- "   Mildly displaced fracture of the anterior right 1st rib   "  No carotid bruits bilaterally  Multi modal pain medication engaged  Right clavicle also fractured; currently in right arm sling  Appreciate APS recommendations

## 2022-05-27 ENCOUNTER — APPOINTMENT (INPATIENT)
Dept: RADIOLOGY | Facility: HOSPITAL | Age: 82
DRG: 205 | End: 2022-05-27
Payer: MEDICARE

## 2022-05-27 ENCOUNTER — HOME HEALTH ADMISSION (OUTPATIENT)
Dept: HOME HEALTH SERVICES | Facility: HOME HEALTHCARE | Age: 82
End: 2022-05-27
Payer: MEDICARE

## 2022-05-27 PROBLEM — S25.109A: Status: ACTIVE | Noted: 2022-05-27

## 2022-05-27 PROCEDURE — 97163 PT EVAL HIGH COMPLEX 45 MIN: CPT

## 2022-05-27 PROCEDURE — 70498 CT ANGIOGRAPHY NECK: CPT

## 2022-05-27 PROCEDURE — 99222 1ST HOSP IP/OBS MODERATE 55: CPT | Performed by: SURGERY

## 2022-05-27 PROCEDURE — G1004 CDSM NDSC: HCPCS

## 2022-05-27 PROCEDURE — 99232 SBSQ HOSP IP/OBS MODERATE 35: CPT | Performed by: INTERNAL MEDICINE

## 2022-05-27 PROCEDURE — 99222 1ST HOSP IP/OBS MODERATE 55: CPT | Performed by: ORTHOPAEDIC SURGERY

## 2022-05-27 PROCEDURE — 23500 CLTX CLAVICULAR FX W/O MNPJ: CPT | Performed by: ORTHOPAEDIC SURGERY

## 2022-05-27 PROCEDURE — 97167 OT EVAL HIGH COMPLEX 60 MIN: CPT

## 2022-05-27 PROCEDURE — NC001 PR NO CHARGE: Performed by: ORTHOPAEDIC SURGERY

## 2022-05-27 PROCEDURE — 99232 SBSQ HOSP IP/OBS MODERATE 35: CPT | Performed by: EMERGENCY MEDICINE

## 2022-05-27 RX ORDER — HYDROMORPHONE HYDROCHLORIDE 2 MG/1
2 TABLET ORAL EVERY 4 HOURS PRN
Status: DISCONTINUED | OUTPATIENT
Start: 2022-05-27 | End: 2022-05-29 | Stop reason: HOSPADM

## 2022-05-27 RX ORDER — ASPIRIN 325 MG
325 TABLET ORAL DAILY
Status: DISCONTINUED | OUTPATIENT
Start: 2022-05-27 | End: 2022-05-29 | Stop reason: HOSPADM

## 2022-05-27 RX ORDER — LEVOTHYROXINE SODIUM 88 UG/1
88 TABLET ORAL DAILY
Status: DISCONTINUED | OUTPATIENT
Start: 2022-05-27 | End: 2022-05-29 | Stop reason: HOSPADM

## 2022-05-27 RX ORDER — EZETIMIBE 10 MG/1
10 TABLET ORAL DAILY
Status: DISCONTINUED | OUTPATIENT
Start: 2022-05-27 | End: 2022-05-29 | Stop reason: HOSPADM

## 2022-05-27 RX ORDER — ASPIRIN 81 MG/1
81 TABLET ORAL DAILY
Status: DISCONTINUED | OUTPATIENT
Start: 2022-05-27 | End: 2022-05-27

## 2022-05-27 RX ORDER — LISINOPRIL 5 MG/1
5 TABLET ORAL EVERY 12 HOURS SCHEDULED
Status: DISCONTINUED | OUTPATIENT
Start: 2022-05-27 | End: 2022-05-29 | Stop reason: HOSPADM

## 2022-05-27 RX ORDER — TOPIRAMATE 25 MG/1
50 TABLET ORAL DAILY
Status: DISCONTINUED | OUTPATIENT
Start: 2022-05-27 | End: 2022-05-29 | Stop reason: HOSPADM

## 2022-05-27 RX ORDER — DULOXETIN HYDROCHLORIDE 60 MG/1
60 CAPSULE, DELAYED RELEASE ORAL DAILY
Status: DISCONTINUED | OUTPATIENT
Start: 2022-05-27 | End: 2022-05-29 | Stop reason: HOSPADM

## 2022-05-27 RX ADMIN — LISINOPRIL 5 MG: 5 TABLET ORAL at 09:56

## 2022-05-27 RX ADMIN — LISINOPRIL 5 MG: 5 TABLET ORAL at 21:10

## 2022-05-27 RX ADMIN — DULOXETINE HYDROCHLORIDE 60 MG: 60 CAPSULE, DELAYED RELEASE ORAL at 09:56

## 2022-05-27 RX ADMIN — ASPIRIN 81 MG: 81 TABLET, COATED ORAL at 09:56

## 2022-05-27 RX ADMIN — ASPIRIN 325 MG ORAL TABLET 325 MG: 325 PILL ORAL at 17:16

## 2022-05-27 RX ADMIN — LEVOTHYROXINE SODIUM 88 MCG: 88 TABLET ORAL at 09:56

## 2022-05-27 RX ADMIN — IOHEXOL 70 ML: 350 INJECTION, SOLUTION INTRAVENOUS at 14:18

## 2022-05-27 RX ADMIN — ACETAMINOPHEN 975 MG: 325 TABLET, FILM COATED ORAL at 21:10

## 2022-05-27 RX ADMIN — ACETAMINOPHEN 975 MG: 325 TABLET, FILM COATED ORAL at 13:09

## 2022-05-27 RX ADMIN — OXYCODONE HYDROCHLORIDE 5 MG: 5 TABLET ORAL at 09:59

## 2022-05-27 RX ADMIN — ACETAMINOPHEN 975 MG: 325 TABLET, FILM COATED ORAL at 05:06

## 2022-05-27 RX ADMIN — ENOXAPARIN SODIUM 30 MG: 30 INJECTION SUBCUTANEOUS at 05:06

## 2022-05-27 RX ADMIN — TOPIRAMATE 50 MG: 25 TABLET, FILM COATED ORAL at 13:10

## 2022-05-27 RX ADMIN — ENOXAPARIN SODIUM 30 MG: 30 INJECTION SUBCUTANEOUS at 17:16

## 2022-05-27 RX ADMIN — SENNOSIDES AND DOCUSATE SODIUM 1 TABLET: 50; 8.6 TABLET ORAL at 08:26

## 2022-05-27 RX ADMIN — EZETIMIBE 10 MG: 10 TABLET ORAL at 09:56

## 2022-05-27 NOTE — ASSESSMENT & PLAN NOTE
Patient reported fall down 5 stairs at home; mechanical fall  CT cervical noncontrast impression-"There is a small fracture of an osteophyte from the left inferior articulating process of the C1 vertebral body  Right C6 transverse process fracture  Right C7 transverse process fracture   "  Neurosurgery with evaluation and treatment appreciate recommendations  Speech evaluation-regular diet and thin liquid with no significant oral dysphagia  Multi modal pain medication in place along with supportive pharmacological bowel regimen  Patient denies numbness tingling; currently neurovascularly intact  Appreciate APS recommendations  Per neurosurgery, can d/c collar; can defer CTA as foramina intact

## 2022-05-27 NOTE — PLAN OF CARE
Problem: NEUROSENSORY - ADULT  Goal: Achieves stable or improved neurological status  Description: INTERVENTIONS  - Monitor and report changes in neurological status  - Monitor vital signs such as temperature, blood pressure, glucose, and any other labs ordered   - Initiate measures to prevent increased intracranial pressure  - Monitor for seizure activity and implement precautions if appropriate      Outcome: Progressing  Goal: Achieves maximal functionality and self care  Description: INTERVENTIONS  - Monitor swallowing and airway patency with patient fatigue and changes in neurological status  - Encourage and assist patient to increase activity and self care     - Encourage visually impaired, hearing impaired and aphasic patients to use assistive/communication devices  Outcome: Progressing     Problem: RESPIRATORY - ADULT  Goal: Achieves optimal ventilation and oxygenation  Description: INTERVENTIONS:  - Assess for changes in respiratory status  - Assess for changes in mentation and behavior  - Position to facilitate oxygenation and minimize respiratory effort  - Oxygen administered by appropriate delivery if ordered  - Initiate smoking cessation education as indicated  - Encourage broncho-pulmonary hygiene including cough, deep breathe, Incentive Spirometry  - Assess the need for suctioning and aspirate as needed  - Assess and instruct to report SOB or any respiratory difficulty  - Respiratory Therapy support as indicated  Outcome: Progressing     Problem: MUSCULOSKELETAL - ADULT  Goal: Maintain or return mobility to safest level of function  Description: INTERVENTIONS:  - Assess patient's ability to carry out ADLs; assess patient's baseline for ADL function and identify physical deficits which impact ability to perform ADLs (bathing, care of mouth/teeth, toileting, grooming, dressing, etc )  - Assess/evaluate cause of self-care deficits   - Assess range of motion  - Assess patient's mobility  - Assess patient's need for assistive devices and provide as appropriate  - Encourage maximum independence but intervene and supervise when necessary  - Involve family in performance of ADLs  - Assess for home care needs following discharge   - Consider OT consult to assist with ADL evaluation and planning for discharge  - Provide patient education as appropriate  Outcome: Progressing  Goal: Maintain proper alignment of affected body part  Description: INTERVENTIONS:  - Support, maintain and protect limb and body alignment  - Provide patient/ family with appropriate education  Outcome: Progressing

## 2022-05-27 NOTE — CASE MANAGEMENT
Case Management Assessment & Discharge Planning Note    Patient name Ronn Harm  [facility] [address] [facility] [id number]  : 1940 Date 2022       Current Admission Date: 2022  Current Admission Diagnosis:Closed fracture of right clavicle   Patient Active Problem List    Diagnosis Date Noted    Closed fracture of right clavicle 2022    [facility]    Fall down stairs 2022    Closed fracture of one rib of right side 2022    Acute pain due to trauma 2022    Chronic pain syndrome 2021    Sjogren's syndrome without extraglandular involvement [facility] 2021    Raynaud's disease without gangrene 2021    Spinal stenosis of lumbosacral region 2021    Left foot drop 2020    Neuropathy 2018    Lumbar radiculopathy 2018    History of DVT (deep vein thrombosis) 12/15/2016    Statin intolerance 12/15/2016    Solitary fibrous tumor 2016    S/P Lumbar laminectomy and decompressio L2-S1 2016    Hypertension 2016    Fibromyalgia     Hyperlipidemia     Migraine     Lumbar stenosis with neurogenic claudication 2016    [facility] osteomyelitis of left foot ([facility] ) 2015    PAF [facility]paroxysmal atrial fibrillation) ([facility] ) 2015    Lyme disease 11/10/2015    Peripheral neuropathy, hereditary/idiopathic 10/29/2015    Pleural nodules 10/06/2015    Varicose veins 2015    Lung mass 2015    Knee osteoarthritis 2015    Abnormal chest x-ray 2015    Back pain 2015    Generalized pain 2015    Tinnitus 2015    Headache 2012    Depression with anxiety 2012    Esophageal reflux 2012    Impaired fasting glucose 2012    Insomnia 2012    Memory loss 2012    Hypothyroidism 2012      LOS (days): 1  Geometric Mean LOS (GMLOS) (days): 2 90  Days to GMLOS:1 6     OBJECTIVE:    Risk of Unplanned Readmission Score: 7 48         Current admission status: Inpatient    Preferred Pharmacy:   [address] [facility] [address]  [address] [facility] [address]  Phone: [phone] Fax: [address]  [address], [address]  [address] [address]  Phone: [phone] Fax: [phone]    Primary Care Provider: Dariel Shaffer MD    Primary Insurance: MEDICARE  Secondary Insurance: [employer]    ASSESSMENT:  [address] [facility],  [address], [address] Representative - Son   Primary Phone: [phone] (Mobile)               Advance Directives  Does patient have a [address]?: Yes  Does patient have Advance Directives?: Yes  Advance Directives: Power of  for health care, Power of  for finance  Primary Contact: Pako Dumont (son)    Readmission Root Cause  30 Day Readmission: No    Patient Information  Admitted from[de-identified] Home  Mental Status: Alert  During Assessment patient was accompanied by: Not accompanied during assessment  Assessment information provided by[de-identified] Patient  Primary Caregiver: Self  Support Systems: Self, Son  Home entry access options   Select all that apply : Stairs  Number of steps to enter home : 2  Do the steps have railings?: No  Type of Current Residence: 2 story home  Upon entering residence, is there a bedroom on the main floor (no further steps)?: No  A bedroom is located on the following floor levels of residence (select all that apply):: 2nd Floor  Upon entering residence, is there a bathroom on the main floor (no further steps)?: Yes (half bath on first floor )  Number of steps to 2nd floor from main floor: One Flight  Living Arrangements: Lives Alone (Patient reports her son resides next door and checks in with her daily )    Activities of Daily Living Prior to Admission  Functional Status: Independent  Completes ADLs independently?: Yes  Ambulates independently?: Yes  Does patient use assisted devices?: Yes  Assisted Devices (DME) used: Rollator  Does patient currently own DME?: Yes  What DME does the patient currently own?: Rollator, Bedside Commode  Does patient have a history of Outpatient Therapy (PT/OT)?: No  Does the patient have a history of Short-Term Rehab?: Yes (pt reports prior [facility] hx)  Does patient have a history of HHC?: No  Does patient currently have [facility]?: No    Patient Information Continued  Does patient have prescription coverage?: Yes  Does patient have a history of substance abuse?: No  Does patient have a history of Mental Health Diagnosis?: No    Means of Transportation  Means of Transport to Appts[de-identified] Family transport (son provides transportation to and from appointments)        DISCHARGE DETAILS:    Discharge planning discussed with[de-identified] patient at bedside  Freedom of Choice: Yes  Comments - Freedom of Choice: Per rounds with therapy this AM, Pt evaluated and recommended IP STR  Discussed w/ patient at bedside  STR vs HHC discussed at length as requested  Patient adamantly refusing IP rehab due to pets residing in her home  Pt spoke intrest in [facility] vs IP STR at this time    Were Treatment Team discharge recommendations reviewed with patient/caregiver?: Yes  Did patient/caregiver verbalize understanding of patient care needs?: Yes  Were patient/caregiver advised of the risks associated with not following Treatment Team discharge recommendations?: Yes    [address]         Is the patient interested in [facility] at discharge?: Yes  [address] requested[de-identified] Occupational Therapy, Physical [address] Name[de-identified] Other  [address] [place] [address] Provider[de-identified] PCP  Home Health Services Needed[de-identified] Gait/ADL Training, Evaluate Functional Status and Safety, Strengthening/Theraputic Exercises to Improve Function  Homebound Criteria Met[de-identified] Requires the Assistance of Another Person for Safe Ambulation or to Leave the Home, Uses an Assist Device (i e  cane, walker, etc)  Supporting Clincal Findings[de-identified] Limited Endurance, Fatigues Easliy in [employer]    DME Referral Provided  Referral made for DME?: No (No DME rec's at this time, CM team will continue to follow )    Other Referral/Resources/Interventions Provided:  Interventions: [facility]  Referral Comments: Referral placed to local [place] [address] agencies as requested by patient for PT/OT services  CM team will follow for accepting agency      Treatment Team Recommendation: Short Term Rehab  Discharge Destination Plan[de-identified] Home with [facility] referral's placed and pending )

## 2022-05-27 NOTE — PROGRESS NOTES
[address] [facility]  Progress Note - Rina Anne 1940, 78 y o  female MRN: [id number]  Unit/Bed#: [facility] 607-01 Encounter: [id number]  Primary Care Provider: Nathalie Miller MD   Date and time admitted to hospital: 5/25/2022  7:53 PM    Acute pain due to trauma  Assessment & Plan  · Status fall down several steps  · Diagnosed with right clavicle, right scapula none right 1st rib fractures     · Continues to have moderate pain in right shoulder and neck  · States pain regimen at least moderately helpful  · Continue Tylenol 975 mg p o  q 8 hours scheduled  · Continue lidocaine patch, on for 12 hours and off for 12 hours  · Suggest discontinue oxycodone  · Start Dilaudid 2 mg p o  q 4 hours p r n  moderate to severe pain  · Continue Dilaudid 0 2 mg IV q 1 hour p r n  breakthrough pain  Attempt to discontinue over the weekend  · Continue bowel regimen to avoid opioid induced constipation while patient on opioid pain medication  · Ice to painful area for up to 20 minutes every hour as needed  Chronic pain syndrome  Assessment & Plan  · Follows with primary care provider  · No chronic opioid use  Depression with anxiety  Assessment & Plan   Patients with depression and/or anxiety have more perceived pain on average and often require higher doses of opioid pain medication   Treat depression and/or anxiety aggressively  Acute pain due to right clavicle, right scapula and right 1st rib fractures  APS will continue to follow  Please contact Acute Pain Service - [facility] via [employer] from [phone] with additional questions or concerns  See TigerText or Connie for additional contacts and after hours information  Pain History  Current pain location(s):  Right anterior shoulder over clavicle    Pain Scale:   8 to 9/10  Quality:  Sharp  24 hour history:  Patient states she is having significant pain over the right clavicle primarily, however has pain throughout the shoulder  Feels the oxycodone has been minimally effective, gets relief from IV Dilaudid  Opioid requirement previous 24 hours:  Oxycodone 10 mg p o , Dilaudid 0 2 mg IV  Meds/Allergies   all current active meds have been reviewed, current meds:   Current Facility-Administered Medications   Medication Dose Route Frequency    acetaminophen (TYLENOL) tablet 975 mg  975 mg Oral Q8H [address]    aspirin (ECOTRIN LOW STRENGTH) EC tablet 81 mg  81 mg Oral Daily    DULoxetine (CYMBALTA) delayed release capsule 60 mg  60 mg Oral Daily    enoxaparin (LOVENOX) subcutaneous injection 30 mg  30 mg Subcutaneous Q12H    ezetimibe (ZETIA) tablet 10 mg  10 mg Oral Daily    HYDROmorphone (DILAUDID) tablet 2 mg  2 mg Oral Q4H PRN    HYDROmorphone HCl (DILAUDID) injection 0 2 mg  0 2 mg Intravenous Q1H PRN    levothyroxine tablet 88 mcg  88 mcg Oral Daily    lisinopril (ZESTRIL) tablet 5 mg  5 mg Oral Q12H [address]    senna-docusate sodium (SENOKOT S) 8 6-50 mg per tablet 1 tablet  1 tablet Oral Daily    topiramate (TOPAMAX) tablet 50 mg  50 mg Oral Daily    and PTA meds:   Prior to Admission Medications   Prescriptions Last Dose Informant Patient Reported? Taking? Cholecalciferol (VITAMIN D3) 5000 units CAPS  Self Yes No   Sig: Take 1,000 Units by mouth daily    DULoxetine (CYMBALTA) 30 mg delayed release capsule  Self No No   Sig: Take 3 capsules (90 mg dose) daily  Patient not taking: Reported on 3/7/2022    DULoxetine (CYMBALTA) 60 mg delayed release capsule   No No   Sig: Take 1 capsule (60 mg total) by mouth daily   Menthol 5 4 MG LOZG  Self No No   Sig: Take 1 lozenge every 2 hours as needed     Multiple Vitamin (multivitamin) tablet  Self Yes No   Sig: Take 1 tablet by mouth daily   aspirin (ECOTRIN LOW STRENGTH) 81 mg EC tablet  Self Yes No   Sig: Take 81 mg by mouth daily   chlorhexidine (PERIDEX) 0 12 % solution  Self Yes No   Sig: RINSE MOUTH WITH 15 ML (1 CAPFUL) FOR 30 SECONDS IN MORNING AND E   (REFER TO PRESCRIPTION NOTES)  ezetimibe (ZETIA) 10 mg tablet  Self Yes No   Sig: Take 10 mg by mouth daily  gabapentin (NEURONTIN) 300 mg capsule  Self No No   Sig: TAKE 1 CAPSULE BY MOUTH 3  TIMES DAILY   Patient not taking: Reported on 3/2/2022    levothyroxine 88 mcg tablet  Self No No   Sig: TAKE 1 TABLET BY MOUTH  DAILY   lisinopril (ZESTRIL) 5 mg tablet  Self Yes No   Sig: Take 5 mg by mouth 2 (two) times a day   topiramate (TOPAMAX) 50 MG tablet  Self No No   Sig: TAKE 1 TABLET BY MOUTH  DAILY      Facility-Administered Medications: None       Allergies   Allergen Reactions    Betaine     Cranberry Extract - Food Allergy     Cranberry Juice Powder - Food Allergy     Latex      Other reaction(s): Rash and itching    Soy Isoflavones      Other reaction(s): Itching    Soybean-Containing Drug Products - Food Allergy     Voltaren [Diclofenac Sodium]        Objective     Temp:  [97 6 °F (36 4 °C)-97 9 °F (36 6 °C)] 97 8 °F (36 6 °C)  HR:  [57-86] 69  Resp:  [18] 18  BP: ()/(55-82) 128/66    Physical Exam  Gen: Awake and alert, NAD, Does not appear ill  Vital signs reviewed  Nursing notes reviewed  Eyes: PERRL, sclera/conjunctiva normal   ENT: No JVD, trachea midline, moist mucus membranes  CV: Heart normal rhythm and normal rate  No extra systoles  Skin: Warm, dry  Cap refill <2 seconds  No diaphoresis  Neuro: A&O x 3, GCS 15 (E4, V5, M6)  No obvious focal motor deficit  Psych: Normal speech, normal attention, normal behavior  Lab Results:   Results from last 7 days   Lab Units 05/26/22  0440   WBC Thousand/uL 8 98   HEMOGLOBIN g/dL 12 0   HEMATOCRIT % 37 3   PLATELETS Thousands/uL 202      Results from last 7 days   Lab Units 05/26/22  0440   POTASSIUM mmol/L 4 2   CHLORIDE mmol/L 108   CO2 mmol/L 26   BUN mg/dL 21   CREATININE mg/dL 0 70   CALCIUM mg/dL 9 0    Estimated Creatinine Clearance: 56 mL/min (by C-G formula based on SCr of 0 7 mg/dL)      Imaging Studies: I have personally reviewed pertinent reports  Counseling / Coordination of Care  Greater than 50% of total time was spent with the patient and / or family counseling and / or coordination of care  A description of the counseling / coordination of care:  Patient interview, physical examination, review of medical record, review of imaging and laboratory data, development of pain management plan, discussion of pain management plan with patient and primary service  Explanation of risks and benefits of suggested pain medications  Please note that the APS provides consultative services regarding pain management only  With the exception of ketamine and epidural infusions and except when indicated, final decisions regarding starting or changing doses of analgesic medications are at the discretion of the consulting service  Off hours consultation and/or medication management is generally not available  Portions of the record may have been created with voice recognition software  Occasional wrong-word or 'sound-a-like' substitutions may have occurred due to the inherent limitations of voice recognition software       Patrick Reid PA-C  Acute Pain Service

## 2022-05-27 NOTE — PROGRESS NOTES
Progress Note - Geriatric Medicine   Chao Cardenas 78 y o  female MRN: [id number]  [facility] Encounter: [id number]      Assessment/Plan:    Ambulatory dysfunction with fall  -injuries outlined below  -remains high risk future falls, continue precautions, assist with transfers  -maintain environment free of fall hazards  -recommend home fall risk assessment personal fall alert system on return home  -PT and OT following    Right clavicle and 1st rib fractures  -s/p fall as outlined above  -NWB RUE in sling  -neurovascular checks per protocol  -acute pain control    Right scapular fracture  -plan as above    Cervical spine fracture  -C1 osteophyte fracture, right C6 and C7 TP fractures  -Nsx evaluated - C-collar discontinued and soft collar PRN comfort  -conservative management  -continue acute pain control - APS on consult    Depression with anxiety  -recommend resumption of home duloxetine  -continue close outpatient follow-up with PCP for ongoing dose titration and medication management    Hypothyroidism  -continue home levothyroxine regimen    Migraine headaches   -continue home Topamax regimen  -outpatient follow-up with Neurology    Impaired vision  -encourage use of corrective lenses all appropriate times  -consider large font for printed materials provided to patient    Frailty syndrome in geriatric patient  -optimize chronic conditions, address acute derangements as arise encourage well-balanced nutrition    High risk developing delirium  -ensure pain control  -maintain normal circadian rhythm  -reorient frequently    Care coordination: rounded with German (Trauma Resident)    Subjective:     Patient seen and examined at bedside where she is lying resting, she reports ongoing severe pain in her right arm/shoulder and thoracic region   She does not feel that she gets relief with current oral regimen and states IV dilaudid has helped most but is understanding that she cannot be on IV pain medication long term although she does express frustration that she had PICC placed yesterday for IV access and feels that it was placed for no reason as she is not getting medications through it or having blood drawn through it  She also expressed frustration that she has not had her home medications resumed  Review of Systems   Constitutional: Negative  Negative for appetite change (hungry), chills and fever  HENT: Negative  Eyes: Negative  Respiratory: Negative  Negative for shortness of breath  Cardiovascular: Negative  Gastrointestinal: Positive for constipation  Genitourinary: Negative  Musculoskeletal: Positive for arthralgias, gait problem and myalgias  Severe right shoulder and thoracic region pain   Skin: Negative  Neurological: Negative for dizziness, weakness, light-headedness and headaches  Hematological: Negative  Psychiatric/Behavioral: Negative  Negative for sleep disturbance  All other systems reviewed and are negative  Objective:     Vitals: Blood pressure 97/55, pulse 57, temperature 97 6 °F (36 4 °C), resp  rate 18, height 5' 6" (1 676 m), weight 64 1 kg (141 lb 5 oz), SpO2 95 %  ,Body mass index is 22 81 kg/m²  Intake/Output Summary (Last 24 hours) at 5/27/2022 0719  Last data filed at 5/26/2022 2311  Gross per 24 hour   Intake 225 ml   Output 1500 ml   Net -1275 ml     Current Medications: Reviewed    Physical Exam:   Physical Exam  Vitals and nursing note reviewed  Constitutional:       General: She is not in acute distress  Appearance: Normal appearance  She is not ill-appearing  HENT:      Head: Normocephalic and atraumatic  Nose: Nose normal       Mouth/Throat:      Mouth: Mucous membranes are dry  Comments: Dentition in tact  Eyes:      General:         Right eye: No discharge  Left eye: No discharge        Conjunctiva/sclera: Conjunctivae normal       Comments: Pupils equal round   Neck:      Comments: Trachea midline, phonation normal  Cardiovascular:      Rate and Rhythm: Normal rate  Comments: Distant heart sounds  Pulmonary:      Effort: Pulmonary effort is normal  No respiratory distress  Breath sounds: No wheezing  Comments: Saturating well on room air  Abdominal:      General: There is no distension  Palpations: Abdomen is soft  Tenderness: There is no abdominal tenderness  Musculoskeletal:      Cervical back: Neck supple  Right lower leg: No edema  Left lower leg: No edema  Comments: RUE in sling   Skin:     General: Skin is warm and dry  Neurological:      Mental Status: She is alert  Comments: Awake and alert, answers questions appropriately, speech clear and fluent    Psychiatric:         Mood and Affect: Mood normal          Behavior: Behavior normal       Comments: Mood affect appropriate for current circumstances       Invasive Devices  Report    Peripherally Inserted Central Catheter Line  Duration           PICC Line 05/26/22 Left Brachial <1 day              Lab, Imaging and other studies: I have personally reviewed pertinent reports

## 2022-05-27 NOTE — ASSESSMENT & PLAN NOTE
· Status fall down several steps  · Diagnosed with right clavicle, right scapula none right 1st rib fractures     · Continues to have moderate pain in right shoulder and neck  · States pain regimen at least moderately helpful  · Continue Tylenol 975 mg p o  q 8 hours scheduled  · Continue lidocaine patch, on for 12 hours and off for 12 hours  · Suggest discontinue oxycodone  · Start Dilaudid 2 mg p o  q 4 hours p r n  moderate to severe pain  · Continue Dilaudid 0 2 mg IV q 1 hour p r n  breakthrough pain  Attempt to discontinue over the weekend  · Continue bowel regimen to avoid opioid induced constipation while patient on opioid pain medication  · Ice to painful area for up to 20 minutes every hour as needed

## 2022-05-27 NOTE — OCCUPATIONAL THERAPY NOTE
Occupational Therapy Evaluation     Patient Name: Abdiaziz Peacock  GBECA'Q Date: 5/27/2022  Problem List  Active Problems:    Depression with anxiety    Chronic pain syndrome    Closed fracture of right clavicle    Cervical spine fracture ([facility] [address] )    Fall down stairs    Closed fracture of one rib of right side    Acute pain due to trauma    Past Medical History  Past Medical History:   Diagnosis Date    CTS (carpal tunnel syndrome)     unspecified laterality    DVT (deep venous thrombosis) ([facility])     left leg, s/p IVC filter 11/2015    Endometriosis     Fibromyalgia     Fibromyalgia, primary     Foot drop, left foot     Hypercholesteremia     Hypertension     Hypothyroidism     Lyme disease     treated 8/2015    Migraine     Migraine     Neurogenic claudication due to lumbar spinal stenosis     Osteoarthritis     PAF (paroxysmal atrial fibrillation) ([facility] [address] )     s/p ablation at [facility] (se[facility] Dr Michael Uribe at [facility])    Peripheral neuropathy     Raynaud's syndrome without gangrene     Sjogren's syndrome ([facility] [address] )     Solitary pulmonary nodule on lung CT      Past Surgical History  Past Surgical History:   Procedure Laterality Date    ABDOMINAL HYSTERECTOMY      APPENDECTOMY      ATRIAL ABLATION SURGERY      cath    BREAST SURGERY Left     puncture aspiration of cyst onset 1972    CHOLECYSTECTOMY      onset 1981    COLONOSCOPY      fiberoptic    FOOT SURGERY Bilateral     onset 1993- removal of mortons neuroma    HAND SURGERY      gaglion cyst removal    HAND SURGERY      onset 1991 - carpal tunnel    IVC FILTER INSERTION      KNEE ARTHROSCOPY Left     therapeutic     LYMPH NODE BIOPSY      1996 onset    ID ARTHRODESIS POSTERIOR/POSTEROLATERAL LUMBAR N/A 3/29/2016    Procedure: L2-S1 LAMINECTOMY;  Surgeon: Kemi Aguilar DO;  [facility]: [facility];  Service: Orthopedics    TOTAL ABDOMINAL HYSTERECTOMY      onset 1989 05/27/22 0929   OT Last Visit   OT Visit Date 05/27/22   Note Type   Note type Evaluation Restrictions/Precautions   Weight Bearing Precautions Per Order Yes   RUE Weight Bearing Per Order (S)  NWB  (IN SLING)   Braces or Orthoses Sling  (SOFT COLLAR PRN  CHRONIC FOOT DROP, REPORTS NOT USING MAFO AT BASELINE)   Other Precautions Cognitive; Chair Alarm; Bed Alarm;WBS;Multiple lines; Fall Risk;Pain;Spinal precautions   Pain Assessment   Pain Assessment Tool 0-10   Pain Score 7   Pain Location/Orientation Orientation: Right;Location: Arm;Location: Shoulder   Hospital Pain Intervention(s) Repositioned; Ambulation/increased activity; Emotional support   Home Living   Type of [address] [place] [address] Two level;1/2 bath on main level;Stairs to enter with rails  (1-2 REGAN)   Bathroom Shower/Tub Walk-in shower   Bathroom Toilet Raised   Bathroom Equipment Shower chair; Toilet raiser   [address]   (ROLLATOR)   Additional Comments ROLLATOR FOR COMMUNITY USE  + BSC/SC   Prior Function   Level of [place] Independent with ADLs and functional mobility   Lives With (S)  Alone   Receives Help From Family;Friend(s)   ADL Assistance Independent   IADLs Independent   Falls in the last 6 months (S)  5 to 10   Vocational Retired   Lifestyle   Autonomy  [facility] ADLS/LT IADLS PTA  FAMILY ASSISTS WITH HEAVY IADLS/DRIVING   Reciprocal Relationships LIVES ALONE  LOCAL SON CHECKS IN AS NEEDED   Service to Others RETIRED   Intrinsic Gratification ENJOYS BEING HOME WITH HER CAT AND DOG   ADL   Eating Assistance 4  Minimal Assistance   Grooming Assistance 4  Minimal Assistance   UB Bathing Assistance 3  Moderate Assistance   LB Bathing Assistance 3  Moderate Assistance   UB Dressing Assistance 3  Moderate Assistance   LB Dressing Assistance 3  Moderate Assistance   Toileting Assistance  3  Moderate Assistance   Functional Assistance 3  Moderate Assistance   Bed Mobility   Supine to Sit 4  Minimal assistance   Additional items Assist x 1; Increased time required;Verbal cues;LE management   Sit to Supine Unable to assess  (PT LEFT OOB WITH ALL NEEDS IN REACH + ALARM ON)   Transfers   Sit to Stand 4  Minimal assistance   Additional items Assist x 1; Increased time required;Verbal cues   Stand to Sit 4  Minimal assistance   Additional items Assist x 1; Increased time required;Verbal cues   Functional Mobility   Functional Mobility 4  Minimal assistance   Additional Comments QC   Balance   Static Sitting Fair +   Static Standing Poor +   Ambulatory Poor +   Activity Tolerance   Activity Tolerance Patient limited by fatigue   Medical Staff Made Aware PT SEEN FOR CO-SESSION WITH SKILLED PHYSICAL THERAPIST 2' CLINICALLY UNSTABLE PRESENTATION, POLY-TRAUMATIC INJURIES, NEW PRECAUTIONS/LIMITATIONS, LIMITED ACTIVITY TOLERANCE AND PRESENT IMPAIRMENTS WHICH ARE A REGRESSION FROM THE PT'S BASELINE AND IMPACTING OVERALL OCCUPATIONAL PERFORMANCE  Nurse Made Aware APPROPRIATE TO SEE   RUE Assessment   RUE Assessment X   LUE Assessment   LUE Assessment WFL   Cognition   Overall Cognitive Status Impaired   Arousal/Participation Alert; Cooperative   Attention Attends with cues to redirect   Orientation Level Oriented X4   Memory Decreased recall of precautions;Decreased recall of recent events;Decreased short term memory   Following Commands Follows one step commands with increased time or repetition   Comments PT IS PLEASANT AND COOPERATIVE  EASILY DISTRACTED T/O SESSION, REQUIRING CUES TO ATTEND TO TASK  LIMITED INSIGHT/JUDGEMENT/ SAFETY AWARENESS  ALARM ON FOR SAFETY AT ALL TIMES   Assessment   Limitation Decreased ADL status; Decreased Safe judgement during ADL;Decreased cognition;Decreased endurance;Decreased self-care trans;Decreased high-level ADLs   Prognosis Good;Fair   Assessment 77 YO Female SEEN FOR INITIAL OCCUPATIONAL THERAPY EVALUATION FOLLOWING ADMISSION TO [facility] S/P FALL  PT CURRENTLY HAS THE FOLLOWING RESTRICTIONS;RUE NWB STATUS IN SLING AND SOFT COLLAR PRN   PROBLEMS LIST INCLUDES CTS (carpal tunnel syndrome), DVT (deep venous thrombosis) ([facility] [address] ), Endometriosis, Fibromyalgia, Fibromyalgia, primary, Foot drop, left foot, Hypercholesteremia, Hypertension, Hypothyroidism, Lyme disease, Migraine, Migraine, Neurogenic claudication due to lumbar spinal stenosis, Osteoarthritis, PAF (paroxysmal atrial fibrillation) ([facility] [address] ), Peripheral neuropathy, Raynaud's syndrome without gangrene, [facility] and Solitary pulmonary nodule on lung CT  PT IS FROM HOME ALONE WHERE SHE REPORTS BEING INDEPENDENT WITH ADLS/LT IADLS  PTA  PT CURRENTLY REQUIRES OVERALL MOD A WITH ADLS, AND MIN A WITH TRANSFERS /FUNCTIONAL MOBILITY WITH USE OF WBQC  PT IS LIMITED 2' PAIN, FATIGUE, IMPAIRED BALANCE, FALL RISK , LIMITED SAFETY AWARENESS/INSIGHT/JUDGEMENT, WB RESTRICTIONS, ORTHOPEDIC RESTRICTIONS, OVERALL WEAKNESS/DECONDITIONING , LIMITED FAMILY/FRIEND SUPPORT , INACCESSIBLE HOME ENVIRONMENT and OVERALL LIMITED ACTIVITY TOLERANCE  PT EDUCATED ON CARRY OVER OF WB STATUS, DEEP BREATHING TECHNIQUES T/O ACTIVITY, SLOWING OF PACE, ENERGY CONSERVATION TECHNIQUES FOR CARRY OVER UPON D/C, INCREASED FAMILY SUPPORT and CONTINUE PARTICIPATION IN SELF-CARE/MOBILITY WITH STAFF [address]   The patient's raw score on the AM-PAC Daily Activity inpatient short form is 14, standardized score is 33 39, less than 39 4  Patients at this level are likely to benefit from discharge to post-acute rehabilitation services  Please refer to the recommendation of the Occupational Therapist for safe discharge planning  FROM AN OCCUPATIONAL THERAPY PERSPECTIVE, PT WOULD BENEFIT FROM ADDITIONAL OT SERVICES IN AN INPT REHAB SETTING UPON D/C  PT MAY BENEFIT FROM FORMAL COG ASSESSMENT PRIOR TO RETURNING HOME ALONE  WILL CONT TO FOLLOW TO ADDRESS THE BELOW DESCRIBED GOALS  Goals   Patient Goals TO RETURN HOME   CURRENTL REFUSING REHAB, AGREEABLE TO HOME THERAPY ONLY IF THEY "DO IT ON THE DECK"   LTG Time Frame 10-14   Long Term Goal #1 SEE BELOW   Plan   Treatment Interventions ADL retraining;Functional transfer training; Endurance training;Cognitive reorientation;Patient/family training;Equipment evaluation/education; Compensatory technique education; Energy conservation; Activityengagement   Goal Expiration Date 06/10/22   OT Frequency 3-5x/wk   Recommendation   Recommendation Geriatric Consult   OT Discharge Recommendation Post acute rehabilitation services   OT - OK to Discharge Yes   AM-PAC Daily Activity Inpatient   Lower Body Dressing 2   Bathing 2   Toileting 2   Upper Body Dressing 2   Grooming 3   Eating 3   Daily Activity Raw Score 14   Daily Activity Standardized Score (Calc for Raw Score >=11) 33 39   AM-PAC Applied Cognition Inpatient   Following a Speech/Presentation 3   Understanding Ordinary Conversation 4   Taking Medications 3   Remembering Where Things Are Placed or Put Away 3   Remembering List of 4-5 Errands 3   Taking Care of Complicated Tasks 3   Applied Cognition Raw Score 19   Applied Cognition Standardized Score 39 77   Modified Prosperity Scale   Modified Prosperity Scale 4       OCCUPATIONAL THERAPY GOALS TO BE MET WITHIN 10-14 DAYS:    -Pt will complete additional cognitive assessment (ACLS) with 100% attention to task in order to assist with safe d/c plan  -Pt will follow 100% simple 2-step commands and be A&O x4 consistently with environmental cues to increase participation in functional activities  -Pt will increase bed mobility to MOD I to participate in functional activities with G tolerance and balance  -Pt will improve functional mobility and transfers to MOD I on/off all surfaces w/ G balance/safety including toileting   -Pt will participate in lt grooming task with MOD I after set-up standing at sink ~3-5 minutes with G safety and balance     -Pt will increase independence in all ADLS to MOD I with G balance sitting upright in chair   -Pt will improve activity tolerance to G for 30 min txment sessions w/ G carry over of learned energy conservation techniques   -Pt will improve independence in lt homemaking activities to MOD I without requiring cues for safety   -Pt will demonstrate G carryover of learned safety techniques and proper body mechanics in functional and leisure activities with use of DME        Documentation completed by Shirley Rondon, [address] [facility], OTR/L  [facility] OF THE [facility] Certified ID# [id number]

## 2022-05-27 NOTE — PLAN OF CARE
Problem: OCCUPATIONAL THERAPY ADULT  Goal: Performs self-care activities at highest level of function for planned discharge setting  See evaluation for individualized goals  Description: Treatment Interventions: ADL retraining, Functional transfer training, Endurance training, Cognitive reorientation, Patient/family training, Equipment evaluation/education, Compensatory technique education, Energy conservation, Activityengagement          See flowsheet documentation for full assessment, interventions and recommendations  Note: Limitation: Decreased ADL status, Decreased Safe judgement during ADL, Decreased cognition, Decreased endurance, Decreased self-care trans, Decreased high-level ADLs  Prognosis: Good, Fair  Assessment: 79 YO Female SEEN FOR INITIAL OCCUPATIONAL THERAPY EVALUATION FOLLOWING ADMISSION TO [facility] S/P FALL  PT CURRENTLY HAS THE FOLLOWING RESTRICTIONS;RUE NWB STATUS IN SLING AND SOFT COLLAR PRN  PROBLEMS LIST INCLUDES CTS (carpal tunnel syndrome), DVT (deep venous thrombosis) ([facility] [address] ), Endometriosis, Fibromyalgia, Fibromyalgia, primary, Foot drop, left foot, Hypercholesteremia, Hypertension, Hypothyroidism, Lyme disease, Migraine, Migraine, Neurogenic claudication due to lumbar spinal stenosis, Osteoarthritis, PAF (paroxysmal atrial fibrillation) ([facility] [address] ), Peripheral neuropathy, Raynaud's syndrome without gangrene, Sjogren's syndrome ([facility] [address] ), and Solitary pulmonary nodule on lung CT  PT IS FROM HOME ALONE WHERE SHE REPORTS BEING INDEPENDENT WITH ADLS/LT IADLS  PTA  PT CURRENTLY REQUIRES OVERALL MOD A WITH ADLS, AND MIN A WITH TRANSFERS /FUNCTIONAL MOBILITY WITH USE OF WBQC  PT IS LIMITED 2' PAIN, FATIGUE, IMPAIRED BALANCE, FALL RISK , LIMITED SAFETY AWARENESS/INSIGHT/JUDGEMENT, WB RESTRICTIONS, ORTHOPEDIC RESTRICTIONS, OVERALL WEAKNESS/DECONDITIONING , LIMITED FAMILY/FRIEND SUPPORT , INACCESSIBLE HOME ENVIRONMENT and OVERALL LIMITED ACTIVITY TOLERANCE   PT EDUCATED ON CARRY OVER OF WB STATUS, DEEP BREATHING TECHNIQUES T/O ACTIVITY, SLOWING OF PACE, ENERGY CONSERVATION TECHNIQUES FOR CARRY OVER UPON D/C, INCREASED FAMILY SUPPORT and CONTINUE PARTICIPATION IN SELF-CARE/MOBILITY WITH STAFF [address]   The patient's raw score on the AM-PAC Daily Activity inpatient short form is 14, standardized score is 33 39, less than 39 4  Patients at this level are likely to benefit from discharge to post-acute rehabilitation services  Please refer to the recommendation of the Occupational Therapist for safe discharge planning  FROM AN OCCUPATIONAL THERAPY PERSPECTIVE, PT WOULD BENEFIT FROM ADDITIONAL OT SERVICES IN AN INPT REHAB SETTING UPON D/C  PT MAY BENEFIT FROM FORMAL COG ASSESSMENT PRIOR TO RETURNING HOME ALONE  WILL CONT TO FOLLOW TO ADDRESS THE BELOW DESCRIBED GOALS  Recommendation: Geriatric Consult  OT Discharge Recommendation: Post acute rehabilitation services  OT - OK to Discharge:  Yes

## 2022-05-27 NOTE — PROGRESS NOTES
Progress Note - Orthopedics   Dumont Oas 78 y o  female MRN: [id number]  Unit[facility] 607-01      Subjective:    78 y  o female  No acute events, no complaints  Pt doing well  Pain controlled  Denies fevers chills, CP, SOB    Labs:  0   Lab Value Date/Time    HCT 37 3 05/26/2022 0440    HCT 38 7 05/25/2022 2341    HCT 40 2 04/15/2021 1222    HCT 39 2 01/03/2019 1142    HCT 38 9 06/02/2017 0951    HCT 26 1 (L) 04/11/2016 0636    HCT 37 1 12/18/2015 1935    HCT 38 4 10/27/2015 1550    HGB 12 0 05/26/2022 0440    HGB 12 2 05/25/2022 2341    HGB 13 0 04/15/2021 1222    HGB 12 9 01/03/2019 1142    HGB 12 9 06/02/2017 0951    HGB 8 4 (L) 04/11/2016 0636    HGB 12 5 12/18/2015 1935    HGB 12 1 10/27/2015 1550    INR 1 07 05/25/2022 2341    INR 1 10 10/27/2015 1550    WBC 8 98 05/26/2022 0440    WBC 11 08 (H) 05/25/2022 2341    WBC 5 5 04/15/2021 1222    WBC 5 1 01/03/2019 1142    WBC 4 4 06/02/2017 0951    WBC 5 70 04/11/2016 0636    WBC 6 01 12/18/2015 1935    WBC 5 55 10/27/2015 1550    ESR 5 12/18/2015 1935       Meds:    Current Facility-Administered Medications:     acetaminophen (TYLENOL) tablet 975 mg, 975 mg, Oral, Q8H [address], Amanda Sanchez MD, 975 mg at 05/27/22 0506    enoxaparin (LOVENOX) subcutaneous injection 30 mg, 30 mg, Subcutaneous, Q12H, Amanda Sanchez MD, 30 mg at 05/27/22 0506    HYDROmorphone HCl (DILAUDID) injection 0 2 mg, 0 2 mg, Intravenous, Q1H PRN, Amanda Sanchez MD, 0 2 mg at 05/26/22 1153    oxyCODONE (ROXICODONE) IR tablet 2 5 mg, 2 5 mg, Oral, Q4H PRN, Amanda Sanchez MD, 2 5 mg at 05/26/22 0446    oxyCODONE (ROXICODONE) IR tablet 5 mg, 5 mg, Oral, Q4H PRN, Amanda Sanchez MD, 5 mg at 05/26/22 2309    senna-docusate sodium (SENOKOT S) 8 6-50 mg per tablet 1 tablet, 1 tablet, Oral, Daily, Jean Pierre Phillips PA-C, 1 tablet at 05/26/22 1440    Blood Culture:   No results found for: BLOODCX    Wound Culture:   No results found for: WOUNDCULT    Ins and Outs:  I/O last 24 hours:   In: 225 [P O :225]  Out: 1500 [Urine:1500]          Physical:  Vitals:    05/27/22 0216   BP: 97/55   Pulse: 57   Resp: 18   Temp: 97 6 °F (36 4 °C)   SpO2: 95%     Musculoskeletal: right Upper Extremity  · Skin warm dry no swelling mild ecchymosis overlying right clavicle  · Sling intact  · TTP overlying right clavicle fracture  · SILT ax/m/r/u  Motor intact ain/pin/m/r/u, 2+ radial pulse      Assessment:    78 y  o female right midshaft clavicle fracture plan for nonop management in sling followup in 4 weeks for repeat xrays      Plan:  · NWB RUE in sling  · No diagnosis of acute blood loss anemia, will monitor and administer IVF/prbc as indicated   · PT/OT  · Pain control  · DVT ppx  · Dispo: OK for discharge from ortho perspective    Kristen Solis MD

## 2022-05-27 NOTE — PROGRESS NOTES
[address] [facility]  Progress Note - Janae Andre 1940, 78 y o  female MRN: [id number]  Unit/Bed#: [facility] 607-01 Encounter: [id number]  Primary Care Provider: Michelle Faith MD   Date and time admitted to hospital: 5/25/2022  7:53 PM    Closed fracture of one rib of right side  Assessment & Plan  Patient reports fall down 5 stairs proximally, mechanical fall  CT chest abdomen pelvis- "   Mildly displaced fracture of the anterior right 1st rib   "  No carotid bruits bilaterally  Multi modal pain medication engaged  Right clavicle also fractured; currently in right arm sling  Appreciate APS recommendations    Fall down stairs  Assessment & Plan  Patient reports fall down 5 stairs approximately  Head CT negative  Cervical spine CT demonstrates small fracture of the osteophyte of from the left anterior articular in process of the C1 vertebral body, and C6 and C7 right transverse process fractures; right clavicle fracture  Patient self ambulatory with no difficulty; lower extremities with no pain  APS with evaluation treatment; appreciate recommendations    Cervical spine fracture [facility])  Assessment & Plan  Patient reported fall down 5 stairs at home; mechanical fall  CT cervical noncontrast impression-"There is a small fracture of an osteophyte from the left inferior articulating process of the C1 vertebral body  Right C6 transverse process fracture  Right C7 transverse process fracture   "  Neurosurgery with evaluation and treatment appreciate recommendations  Speech evaluation-regular diet and thin liquid with no significant oral dysphagia  Multi modal pain medication in place along with supportive pharmacological bowel regimen  Patient denies numbness tingling; currently neurovascularly intact  Appreciate APS recommendations  Per neurosurgery, can d/c collar; can defer CTA as foramina intact      Depression with anxiety  Assessment & Plan  Continue home meds        Disposition: med surg    SUBJECTIVE:    Subjective: Still has pain in the right upper chest and shoulder area  Otherwise doing ok  OBJECTIVE:   Vitals:   Temp:  [97 6 °F (36 4 °C)-97 9 °F (36 6 °C)] 97 8 °F (36 6 °C)  HR:  [54-86] 69  Resp:  [18-20] 18  BP: ()/(55-82) 128/66    Intake/Output:  I/O       05/25 0701 05/26 0700 05/26 0701  05/27 0700 05/27 0701  05/28 0700    P  O   225     Total Intake(mL/kg)  225 (3 5)     Urine (mL/kg/hr)  1500 (1)     Total Output  1500     Net  -1275                 Nutrition: Diet Regular; Regular House  GI Proph/Bowel Reg: senakot s  VTE Prophylaxis:Enoxaparin (Lovenox)     Physical Exam:   Const: alert, no acute distress, non-toxic appearing, no diaphoresis  Appears stated age, thin  In chair having breakfast   Eyes: no conjunctival injection, discharge or icterus  Head: normocephalic  Ears: auricles normal   Nose: normal  Mouth/throat: clear, moist   Neck: normal ROM, supple and without rigidity   CV: normal rate  Extremities warm and well-perfused   Resp: breathing unlabored, non-stridulous   Abdomen: soft, non-tender, non-distended  MSK: RUE sling  Skin: warm and dry  Neuro: CNs II-XII grossly intact  Oriented x 4   Sensation and motor function of extremities grossly intact  Psych: pleasant, cooperative     Invasive Devices  Report    Peripherally Inserted Central Catheter Line  Duration           PICC Line 05/26/22 Left Brachial <1 day                 [facility] Score  PIC Pain Score: 1 (5/27/2022  9:59 AM)  PIC Incentive Spirometry Score: Below alert volume (5/27/2022  8:00 AM)  PIC Cough Description: Weak (5/27/2022  8:00 AM)  PIC Total Score: 7 (5/27/2022  8:00 AM)       If the Total PIC Score </=5, did you consult APS and evaluate patient for further intervention?: na       Pain:    Incentive Spirometry  Cough  3 = Controlled  4 = Above goal volume 3 = Strong  2 = Moderate  3 = Goal to alert volume 2 = Weak  1 = Severe  2 = Below alert volume 1 = Absent     1 = Unable to perform IS         Lab Results: Results: I have personally reviewed all pertinent laboratory/tests results  Imaging/EKG Studies: I have personally reviewed pertinent reports

## 2022-05-27 NOTE — CONSULTS
Consultation - Vascular Surgery   Rina Anne 78 y o  female MRN: [id number]  Unit/Bed#: [facility] 607-01 Encounter: [id number]      Assessment/Plan   1  Right subclavian thrombus, mild   Continue  mg daily   No intervention required    2  Acute pain associated to trauma    Currently on IV dilaudid, APS following    3  Multiple fractures s/p fall including C1, C6-C7 transverse process, right clavicle, 1 st rib and scapula   Neurovascular checks per protocol   RUE in sling   Pain managment   PT/OT    History of Present Illness   Physician Requesting Consult: [place],*  Reason for Consult / Principal Problem: Injury of Right Subclavian Artery    HPI: Rina Anne is a 78y o  year old female who presented on 05/25/2022 s/p fall down 5 stairs at home  She denies losing consciousness Work up identified multiple fracture sites including the vertebral body of C1, C6-C7 tranverse process fractures, as well a, right sided fractures to her clavicle, 1st rib and scapula  Earlier today a CTA of the neck was performed identified a focal irregularity and mild thrombus of the mid right subclavian artery in the region of the displaced right clavicular fracture  Findings were suspicious for mild vascular injury versus possibly atherosclerotic disease  Inpatient consult to Vascular Surgery  Consult performed by: Yesy Montes PA-C  Consult ordered by: Trenton Harvey MD        Review of Systems   Constitutional: Negative for activity change  HENT: Negative  Eyes: Negative  Respiratory: Negative  Cardiovascular: Negative  Gastrointestinal: Negative  Endocrine: Negative  Genitourinary: Negative  Musculoskeletal: Positive for arthralgias and neck pain  Right arm in sling   Skin: Negative  Neurological: Negative  Hematological: Negative  Psychiatric/Behavioral: Negative          Historical Information   Past Medical History:   Diagnosis Date    CTS (carpal tunnel syndrome)     unspecified laterality    DVT (deep venous thrombosis) ([facility])     left leg, s/p IVC filter 11/2015    Endometriosis     Fibromyalgia     Fibromyalgia, primary     Foot drop, left foot     Hypercholesteremia     Hypertension     Hypothyroidism     Lyme disease     treated 8/2015    Migraine     Migraine     Neurogenic claudication due to lumbar spinal stenosis     Osteoarthritis     PAF (paroxysmal atrial fibrillation) ([facility])     s/p ablation at [facility] (sees Dr Jey Ramesh at [facility])   Callum Flower Peripheral neuropathy     Raynaud's syndrome without gangrene     Sjogren's syndrome ([facility] [address] )     Solitary pulmonary nodule on lung CT      Past Surgical History:   Procedure Laterality Date    ABDOMINAL HYSTERECTOMY      APPENDECTOMY      ATRIAL ABLATION SURGERY      cath    BREAST SURGERY Left     puncture aspiration of cyst onset 1972    CHOLECYSTECTOMY      onset 1981    COLONOSCOPY      fiberoptic    FOOT SURGERY Bilateral     onset 1993- removal of mortons neuroma    HAND SURGERY      gaglion cyst removal    HAND SURGERY      onset 1991 - carpal tunnel    IVC FILTER INSERTION      KNEE ARTHROSCOPY Left     therapeutic     LYMPH NODE BIOPSY      1996 onset    [place] ARTHRODESIS POSTERIOR/POSTEROLATERAL LUMBAR N/A 3/29/2016    Procedure: L2-S1 LAMINECTOMY;  Surgeon: Nicky Mart DO;  Location: [facility];  Service: Orthopedics    TOTAL ABDOMINAL HYSTERECTOMY      onset 44     Social History   Social History     Substance and Sexual Activity   Alcohol Use Yes    Comment: per pt occassional GLASS OF WINE     Social History     Substance and Sexual Activity   Drug Use No     E-Cigarette/Vaping     E-Cigarette/Vaping Substances     Social History     Tobacco Use   Smoking Status Never Smoker   Smokeless Tobacco Never Used     Family History:   Family History   Problem Relation Age of Onset    Heart disease Father     ADD / ADHD Father     Stroke Father     Cancer Brother         prostate    Heart attack Maternal Grandmother         acute MI    Cancer Family    }    Meds/Allergies   PTA meds:   Prior to Admission Medications   Prescriptions Last Dose Informant Patient Reported? Taking? Cholecalciferol (VITAMIN D3) 5000 units CAPS  Self Yes No   Sig: Take 1,000 Units by mouth daily    DULoxetine (CYMBALTA) 30 mg delayed release capsule  Self No No   Sig: Take 3 capsules (90 mg dose) daily  Patient not taking: Reported on 3/7/2022    DULoxetine (CYMBALTA) 60 mg delayed release capsule   No No   Sig: Take 1 capsule (60 mg total) by mouth daily   Menthol 5 4 MG LOZG  Self No No   Sig: Take 1 lozenge every 2 hours as needed  Multiple Vitamin (multivitamin) tablet  Self Yes No   Sig: Take 1 tablet by mouth daily   aspirin (ECOTRIN LOW STRENGTH) 81 mg EC tablet  Self Yes No   Sig: Take 81 mg by mouth daily   chlorhexidine (PERIDEX) 0 12 % solution  Self Yes No   Sig: RINSE MOUTH WITH 15 ML (1 CAPFUL) FOR 30 SECONDS IN MORNING AND E   (REFER TO PRESCRIPTION NOTES)  ezetimibe (ZETIA) 10 mg tablet  Self Yes No   Sig: Take 10 mg by mouth daily  gabapentin (NEURONTIN) 300 mg capsule  Self No No   Sig: TAKE 1 CAPSULE BY MOUTH 3  TIMES DAILY   Patient not taking: Reported on 3/2/2022    levothyroxine 88 mcg tablet  Self No No   Sig: TAKE 1 TABLET BY MOUTH  DAILY   lisinopril (ZESTRIL) 5 mg tablet  Self Yes No   Sig: Take 5 mg by mouth 2 (two) times a day   topiramate (TOPAMAX) 50 MG tablet  Self No No   Sig: TAKE 1 TABLET BY MOUTH  DAILY      Facility-Administered Medications: None     Allergies   Allergen Reactions    Betaine     Cranberry Extract - Food Allergy     Cranberry Juice Powder - Food Allergy     Latex      Other reaction(s): Rash and itching    Soy Isoflavones      Other reaction(s): Itching    Soybean-Containing Drug Products - Food Allergy     Voltaren [Diclofenac Sodium]        Objective   Vitals: Blood pressure 144/73, pulse 65, temperature 98 2 °F (36 8 °C), resp   rate 18, height 5' 6" (1 676 m), weight 64 1 kg (141 lb 5 oz), SpO2 97 %  ,Body mass index is 22 81 kg/m²  Intake/Output Summary (Last 24 hours) at 5/27/2022 1952  Last data filed at 5/26/2022 2311  Gross per 24 hour   Intake --   Output 400 ml   Net -400 ml     Invasive Devices  Report    Peripherally Inserted Central Catheter Line  Duration           PICC Line 05/26/22 Left Brachial 1 day                Physical Exam  Constitutional:       Appearance: Normal appearance  HENT:      Head: Normocephalic  Nose: Nose normal       Mouth/Throat:      Mouth: Mucous membranes are moist    Eyes:      Extraocular Movements: Extraocular movements intact  Conjunctiva/sclera: Conjunctivae normal    Cardiovascular:      Rate and Rhythm: Normal rate and regular rhythm  Pulses: Normal pulses  Pulmonary:      Effort: Pulmonary effort is normal  No respiratory distress  Abdominal:      General: Abdomen is flat  There is no distension  Palpations: Abdomen is soft  Musculoskeletal:         General: No swelling  Skin:     General: Skin is warm  Capillary Refill: Capillary refill takes less than 2 seconds  Neurological:      Mental Status: She is alert and oriented to person, place, and time  Psychiatric:         Mood and Affect: Mood normal          Behavior: Behavior normal          Judgment: Judgment normal          Lab Results: CBC with diff: No results found for: WBC, HGB, HCT, MCV, PLT, ADJUSTEDWBC, MCH, MCHC, RDW, MPV, NRBC, BMP/CMP: No results found for: SODIUM, K, CL, CO2, ANIONGAP, BUN, CREATININE, GLUCOSE, CALCIUM, AST, ALT, ALKPHOS, PROT, BILITOT, EGFR, Coags: No results found for: PT, PTT, INR  Imaging Studies: I have personally reviewed pertinent reports  EKG, Pathology, and Other Studies: I have personally reviewed pertinent reports  VTE Prophylaxis: Enoxaparin (Lovenox)     Code Status: Level 1 - Full Code    Counseling / Coordination of Care  Counseling/Coordination of Care:  Total floor / unit time spent today 50 minutes  Greater than 50% of total time was spent with the patient and / or family counseling and / or coordination of care  A description of the counseling / coordination of care: Denny Marroquin [id number]

## 2022-05-27 NOTE — PLAN OF CARE
Problem: PHYSICAL THERAPY ADULT  Goal: Performs mobility at highest level of function for planned discharge setting  See evaluation for individualized goals  Description: Treatment/Interventions: Functional transfer training, LE strengthening/ROM, Elevations, Therapeutic exercise, Endurance training, Patient/family training, Equipment eval/education, Bed mobility, Gait training, Compensatory technique education, Spoke to nursing, OT  Equipment Recommended:  (quad cane)       See flowsheet documentation for full assessment, interventions and recommendations  Note: Prognosis: Good  Problem List: Decreased strength, Decreased range of motion, Decreased endurance, Impaired balance, Decreased mobility, Decreased safety awareness, Orthopedic restrictions, Pain  Assessment: Pt is 78 y o  female seen for a high complexity PT evaluation s/p admit to [facility] on 5/25/2022  Pt presenting s/p fall w/ closed fx of R clavicle, and fx of osteophyte of C1, R transverse process fx C6 + C7  Pt is NWB RUE in sling, soft collar available for comfort only  Please see above for other active problem list / PMH  PT now consulted to assess functional mobility and needs for safe d/c planning  Prior to admission, pt was ambulatory via furniture holding in her house, used rollator for community distances, admits to several falls due to [address], lives alone in a house w/ stairs  Currently pt requires MinAx1 for bed skills; MinAx1 for functional transfers; MinAx1 for limited ambulation w/ QC  Pt presents functioning below baseline and w/ overall mobility deficits 2* to: decreased LE strength; generalized weakness/deconditioning; decreased endurance; impaired balance; gait deviations; pain; fatigue; impaired safety and judgement; limited insight into current deficits; bed/chair alarms  Pt currently at risk for falls   (Please find additional objective findings from PT assessment regarding body systems outlined above ) Pt will continue to benefit from skilled PT interventions to address stated impairments; to maximize functional potential; for ongoing pt/ family training; and DME needs  PT is currently recommending rehab  Barriers to Discharge: Inaccessible home environment, Decreased caregiver support        PT Discharge Recommendation: Post acute rehabilitation services          See flowsheet documentation for full assessment

## 2022-05-27 NOTE — PHYSICAL THERAPY NOTE
Physical Therapy Evaluation    Patient's Name: Abdiaziz Peacock    Admitting Diagnosis  Clavicle fracture [S42 009A]  Frailty [R54]  Closed fracture of one rib of right side, initial encounter [S22 31XA]  Closed nondisplaced fracture of first cervical vertebra, unspecified fracture morphology, initial encounter ([facility] [address] ) [S12 001A]  Closed fracture of right scapula, unspecified part of scapula, initial encounter [S42 101A]  Unspecified multiple injuries, initial encounter [T07  XXXA]    Problem List  Patient Active Problem List   Diagnosis    Lumbar stenosis with neurogenic claudication    Hypertension    Fibromyalgia    Hyperlipidemia    Migraine    S/P Lumbar laminectomy and decompressio L2-S1    Neuropathy    Lumbar radiculopathy    Abnormal chest x-ray    Back pain    Chronic osteomyelitis of left foot (HCC)    Depression with anxiety    Esophageal reflux    Generalized pain    Headache    History of DVT (deep vein thrombosis)    Hypothyroidism    Impaired fasting glucose    Insomnia    Knee osteoarthritis    Lung mass    Lyme disease    Memory loss    PAF (paroxysmal atrial fibrillation) ([facility])    Peripheral neuropathy, hereditary/idiopathic    Pleural nodules    Solitary fibrous tumor    Statin intolerance    Tinnitus    Varicose veins    Left foot drop    Chronic pain syndrome    Sjogren's syndrome without extraglandular involvement ([facility] [address] )    Raynaud's disease without gangrene    Spinal stenosis of lumbosacral region    Closed fracture of right clavicle    Cervical spine fracture ([facility] [address] )    Fall down stairs    Closed fracture of one rib of right side    Acute pain due to trauma       Past Medical History  Past Medical History:   Diagnosis Date    CTS (carpal tunnel syndrome)     unspecified laterality    DVT (deep venous thrombosis) ([facility] [address] )     left leg, s/p IVC filter 11/2015    Endometriosis     Fibromyalgia     Fibromyalgia, primary     Foot drop, left foot     Hypercholesteremia     Hypertension Hypothyroidism     Lyme disease     treated 8/2015    Migraine     Migraine     Neurogenic claudication due to lumbar spinal stenosis     Osteoarthritis     PAF (paroxysmal atrial fibrillation) ([facility])     s/p ablation at [facility] (sees Dr Sharyle Arbour at [facility])    Peripheral neuropathy     Raynaud's syndrome without gangrene     Sjogren's syndrome ([address] )     Solitary pulmonary nodule on lung CT        Past Surgical History  Past Surgical History:   Procedure Laterality Date    ABDOMINAL HYSTERECTOMY      APPENDECTOMY      ATRIAL ABLATION SURGERY      cath    BREAST SURGERY Left     puncture aspiration of cyst onset 1972    CHOLECYSTECTOMY      onset 1981    COLONOSCOPY      fiberoptic    FOOT SURGERY Bilateral     onset 1993- removal of mortons neuroma    HAND SURGERY      gaglion cyst removal    HAND SURGERY      onset 1991 - carpal tunnel    IVC FILTER INSERTION      KNEE ARTHROSCOPY Left     therapeutic     LYMPH NODE BIOPSY      1996 onset    DC ARTHRODESIS POSTERIOR/POSTEROLATERAL LUMBAR N/A 3/29/2016    Procedure: L2-S1 LAMINECTOMY;  Surgeon: Juan Winter DO;  Location: [facility];  Service: Orthopedics    TOTAL ABDOMINAL HYSTERECTOMY      onset 1989 05/27/22 0930   PT Last Visit   PT Visit Date 05/27/22   Note Type   Note type Evaluation   Pain Assessment   Pain Assessment Tool 0-10   Pain Score 7   Pain Location/Orientation Orientation: Right;Location: Shoulder   Hospital Pain Intervention(s) Repositioned   Restrictions/Precautions   Weight Bearing Precautions Per Order Yes   RUE Weight Bearing Per Order NWB   Braces or Orthoses Sling  (sling RUE, soft collar for comfort per NS)   Other Precautions Chair Alarm; Bed Alarm;WBS;Fall Risk;Pain   Home Living   Type of [address] [place] [address] Two level;1/2 bath on main level  (1-2 REGAN; FF up to bedroom + full bathroom)   Bathroom Shower/Tub Walk-in shower   Bathroom Toilet Raised   Bathroom Equipment Shower chair; Toilet raiser   Home Equipment   (rollator for community)   Prior Function   Level of [place] Independent with ADLs and functional mobility; Needs assistance with IADLs   Lives With (S)  Alone   Receives Help From Family;Friend(s)   ADL Assistance Independent   IADLs Needs assistance  (pt's son + friend help w/ groceries, pt does own laundry + meal prep)   Falls in the last 6 months 5 to 10  (estimated, admits to losing balance often)   Comments PTA pt reports being I w/ ambulation via furniture grabbing for household distances, uses rollator for community distances  General   Family/Caregiver Present No   Cognition   Arousal/Participation Alert   Orientation Level Oriented X4   Following Commands Follows one step commands with increased time or repetition   Comments pleasant + cooperative, verbose, cues to redirect, pt motivated to perform as much as she can by herself however does demonstrate some decreased insight into her current functional abilities   Subjective   Subjective Pt agreeable to mobilize  RLE Assessment   RLE Assessment   (grossly at least 3+/5)   LLE Assessment   LLE Assessment   (hx of L foot drop from Lyme's disease (reports never using brace), hip + knee grossly at least 3-/5)   Coordination   Movements are Fluid and Coordinated 1   Bed Mobility   Supine to Sit 4  Minimal assistance   Additional items Assist x 1;HOB elevated; Increased time required;Verbal cues   Sit to Supine Unable to assess   Additional Comments Pt greeted in supine  Transfers   Sit to Stand 4  Minimal assistance   Additional items Assist x 1; Increased time required;Verbal cues   Stand to Sit 4  Minimal assistance   Additional items Assist x 1; Increased time required;Verbal cues   Stand pivot 4  Minimal assistance   Additional items Assist x 1;Verbal cues; Increased time required   Additional Comments QC   Ambulation/Elevation   Gait pattern Excessively slow; Short stride;Decreased heel strike;Decreased foot clearance; Improper Weight shift   Gait Assistance 4 Minimal assist   Additional items Assist x 1;Verbal cues; Tactile cues   Assistive Device Large base quad cane   Distance 3' bed>chair   Balance   Static Sitting Fair +   Dynamic Sitting Fair   Static Standing Poor +   Dynamic Standing Poor +   Ambulatory   (QC)   Endurance Deficit   Endurance Deficit Yes   Endurance Deficit Description weakness, fatigue   Activity Tolerance   Activity Tolerance Patient limited by fatigue   Medical Staff Made Aware OT Chise   Nurse Made Aware yes - cleared pt for PT   Assessment   Prognosis Good   Problem List Decreased strength;Decreased range of motion;Decreased endurance; Impaired balance;Decreased mobility; Decreased safety awareness;Orthopedic restrictions;Pain   Assessment Pt is 78 y o  female seen for a high complexity PT evaluation s/p admit to [facility] on 5/25/2022  Pt presenting s/p fall w/ closed fx of R clavicle, and fx of osteophyte of C1, R transverse process fx C6 + C7  Pt is NWB RUE in sling, soft collar available for comfort only  Please see above for other active problem list / PMH  PT now consulted to assess functional mobility and needs for safe d/c planning  Prior to admission, pt was ambulatory via furniture holding in her house, used rollator for community distances, admits to several falls due to [address], lives alone in a house w/ stairs  Currently pt requires MinAx1 for bed skills; MinAx1 for functional transfers; MinAx1 for limited ambulation w/ QC  Pt presents functioning below baseline and w/ overall mobility deficits 2* to: decreased LE strength; generalized weakness/deconditioning; decreased endurance; impaired balance; gait deviations; pain; fatigue; impaired safety and judgement; limited insight into current deficits; bed/chair alarms  Pt currently at risk for falls   (Please find additional objective findings from PT assessment regarding body systems outlined above )     Pt will continue to benefit from skilled PT interventions to address stated impairments; to maximize functional potential; for ongoing pt/ family training; and DME needs  PT is currently recommending rehab  Barriers to Discharge Inaccessible home environment;Decreased caregiver support   Goals   Patient Goals go home   STG Expiration Date 06/10/22   Short Term Goal #1 In 14 days pt will complete: 1) Bed mobility skills with Bill to facilitate safe return to previous living environment  2) Functional transfers with Bill to facilitate safe return to previous living environment  3) Ambulation with least restrictive ' w/ Bill without LOB for safe ambulation in home/community environment  4) Improve balance scores by 1 grade to decrease fall risk  5) Improve LE strength grades by 1 to increase independence w/ all functional mobility, transfers and gait  6) PT for ongoing pt and family education; DME needs and D/C planning to promote highest level of function in least restrictive environment  7) Stair training up/ down 12 steps with 1 rail and Bill for safe access to previous living environment and to increase community access  PT Treatment Day 0   Plan   Treatment/Interventions Functional transfer training;LE strengthening/ROM; Elevations; Therapeutic exercise; Endurance training;Patient/family training;Equipment eval/education; Bed mobility;Gait training; Compensatory technique education;Spoke to nursing;OT   PT Frequency   (3-6x/wk)   Recommendation   PT Discharge Recommendation Post acute rehabilitation services   Equipment Recommended   (quad cane)   AM-PAC Basic Mobility Inpatient   Turning in Bed Without Bedrails 3   Lying on Back to Sitting on Edge of Flat Bed 3   Moving Bed to Chair 3   Standing Up From Chair 3   Walk in Room 3   Climb 3-5 Stairs 1   Basic Mobility Inpatient Raw Score 16   Basic Mobility Standardized Score 38 32   Highest Level Of Mobility   JH-HLM Goal 5: Stand one or more mins   JH-HLM Achieved 4: Move to chair/commode   End of Consult   Patient Position at End of Consult Bedside chair;Bed/Chair alarm activated; All needs within reach  (BLE elevated)     Romina Rubio, PT, DPT

## 2022-05-28 PROCEDURE — 99232 SBSQ HOSP IP/OBS MODERATE 35: CPT | Performed by: SURGERY

## 2022-05-28 RX ORDER — POLYETHYLENE GLYCOL 3350 17 G/17G
17 POWDER, FOR SOLUTION ORAL DAILY PRN
Status: DISCONTINUED | OUTPATIENT
Start: 2022-05-28 | End: 2022-05-29 | Stop reason: HOSPADM

## 2022-05-28 RX ADMIN — ACETAMINOPHEN 975 MG: 325 TABLET, FILM COATED ORAL at 05:28

## 2022-05-28 RX ADMIN — HYDROMORPHONE HYDROCHLORIDE 2 MG: 2 TABLET ORAL at 01:29

## 2022-05-28 RX ADMIN — ACETAMINOPHEN 975 MG: 325 TABLET, FILM COATED ORAL at 21:37

## 2022-05-28 RX ADMIN — LEVOTHYROXINE SODIUM 88 MCG: 88 TABLET ORAL at 08:43

## 2022-05-28 RX ADMIN — HYDROMORPHONE HYDROCHLORIDE 2 MG: 2 TABLET ORAL at 10:25

## 2022-05-28 RX ADMIN — DULOXETINE HYDROCHLORIDE 60 MG: 60 CAPSULE, DELAYED RELEASE ORAL at 08:42

## 2022-05-28 RX ADMIN — LISINOPRIL 5 MG: 5 TABLET ORAL at 08:43

## 2022-05-28 RX ADMIN — SENNOSIDES AND DOCUSATE SODIUM 1 TABLET: 50; 8.6 TABLET ORAL at 08:43

## 2022-05-28 RX ADMIN — ACETAMINOPHEN 975 MG: 325 TABLET, FILM COATED ORAL at 14:06

## 2022-05-28 RX ADMIN — ENOXAPARIN SODIUM 30 MG: 30 INJECTION SUBCUTANEOUS at 18:30

## 2022-05-28 RX ADMIN — EZETIMIBE 10 MG: 10 TABLET ORAL at 08:43

## 2022-05-28 RX ADMIN — POLYETHYLENE GLYCOL 3350 17 G: 17 POWDER, FOR SOLUTION ORAL at 18:29

## 2022-05-28 RX ADMIN — TOPIRAMATE 50 MG: 25 TABLET, FILM COATED ORAL at 08:43

## 2022-05-28 RX ADMIN — LISINOPRIL 5 MG: 5 TABLET ORAL at 21:37

## 2022-05-28 RX ADMIN — ENOXAPARIN SODIUM 30 MG: 30 INJECTION SUBCUTANEOUS at 05:28

## 2022-05-28 RX ADMIN — ASPIRIN 325 MG ORAL TABLET 325 MG: 325 PILL ORAL at 08:42

## 2022-05-28 NOTE — PLAN OF CARE
Problem: Potential for Falls  Goal: Patient will remain free of falls  Description: INTERVENTIONS:  - Educate patient/family on patient safety including physical limitations  - Instruct patient to call for assistance with activity   - Consult OT/PT to assist with strengthening/mobility   - Keep Call bell within reach  - Keep bed low and locked with side rails adjusted as appropriate  - Keep care items and personal belongings within reach  - Initiate and maintain comfort rounds  - Make Fall Risk Sign visible to staff  - Offer Toileting every **2* Hours, in advance of need  - Initiate/Maintain *bed/chair**alarm  - Obtain necessary fall risk management equipment:   - Apply yellow socks and bracelet for high fall risk patients  - Consider moving patient to room near nurses station  Outcome: Progressing     Problem: MOBILITY - ADULT  Goal: Maintain or return to baseline ADL function  Description: INTERVENTIONS:  -  Assess patient's ability to carry out ADLs; assess patient's baseline for ADL function and identify physical deficits which impact ability to perform ADLs (bathing, care of mouth/teeth, toileting, grooming, dressing, etc )  - Assess/evaluate cause of self-care deficits   - Assess range of motion  - Assess patient's mobility; develop plan if impaired  - Assess patient's need for assistive devices and provide as appropriate  - Encourage maximum independence but intervene and supervise when necessary  - Involve family in performance of ADLs  - Assess for home care needs following discharge   - Consider OT consult to assist with ADL evaluation and planning for discharge  - Provide patient education as appropriate  Outcome: Progressing  Goal: Maintains/Returns to pre admission functional level  Description: INTERVENTIONS:  - Perform BMAT or MOVE assessment daily    - Set and communicate daily mobility goal to care team and patient/family/caregiver     - Collaborate with rehabilitation services on mobility goals if consulted  - Perform Range of Motion *3* times a day  - Reposition patient every *2** hours  - Dangle patient *3** times a day  - Stand patient **3* times a day  - Ambulate patient *3** times a day  - Out of bed to chair *3** times a day   - Out of bed for meals **3 times a day  - Out of bed for toileting  - Record patient progress and toleration of activity level   Outcome: Progressing     Problem: NEUROSENSORY - ADULT  Goal: Achieves stable or improved neurological status  Description: INTERVENTIONS  - Monitor and report changes in neurological status  - Monitor vital signs such as temperature, blood pressure, glucose, and any other labs ordered   - Initiate measures to prevent increased intracranial pressure  - Monitor for seizure activity and implement precautions if appropriate      Outcome: Progressing  Goal: Achieves maximal functionality and self care  Description: INTERVENTIONS  - Monitor swallowing and airway patency with patient fatigue and changes in neurological status  - Encourage and assist patient to increase activity and self care     - Encourage visually impaired, hearing impaired and aphasic patients to use assistive/communication devices  Outcome: Progressing     Problem: RESPIRATORY - ADULT  Goal: Achieves optimal ventilation and oxygenation  Description: INTERVENTIONS:  - Assess for changes in respiratory status  - Assess for changes in mentation and behavior  - Position to facilitate oxygenation and minimize respiratory effort  - Oxygen administered by appropriate delivery if ordered  - Initiate smoking cessation education as indicated  - Encourage broncho-pulmonary hygiene including cough, deep breathe, Incentive Spirometry  - Assess the need for suctioning and aspirate as needed  - Assess and instruct to report SOB or any respiratory difficulty  - Respiratory Therapy support as indicated  Outcome: Progressing     Problem: MUSCULOSKELETAL - ADULT  Goal: Maintain or return mobility to safest level of function  Description: INTERVENTIONS:  - Assess patient's ability to carry out ADLs; assess patient's baseline for ADL function and identify physical deficits which impact ability to perform ADLs (bathing, care of mouth/teeth, toileting, grooming, dressing, etc )  - Assess/evaluate cause of self-care deficits   - Assess range of motion  - Assess patient's mobility  - Assess patient's need for assistive devices and provide as appropriate  - Encourage maximum independence but intervene and supervise when necessary  - Involve family in performance of ADLs  - Assess for home care needs following discharge   - Consider OT consult to assist with ADL evaluation and planning for discharge  - Provide patient education as appropriate  Outcome: Progressing  Goal: Maintain proper alignment of affected body part  Description: INTERVENTIONS:  - Support, maintain and protect limb and body alignment  - Provide patient/ family with appropriate education  Outcome: Progressing     Problem: SKIN/TISSUE INTEGRITY - ADULT  Goal: Skin Integrity remains intact(Skin Breakdown Prevention)  Description: Assess:  -Perform Jaya assessment -Clean and moisturize skin   -Inspect skin when repositioning, toileting, and assisting with ADLS  -Assess under medical devices-Assess extremities for adequate circulation and sensation     Bed Management:  -Have minimal linens on bed & keep smooth, unwrinkled  -Change linens as needed when moist or perspiring  -Avoid sitting or lying in one position for more than *2** hours while in bed  -Keep HOB at **30*degrees     Toileting:  -Offer bedside commode  -Assess for incontinence-Use incontinent care products after each incontinent episode   Activity:  -Mobilize patient *3* times a day  -Encourage activity and walks on unit  -Encourage or provide ROM exercises   -Turn and reposition patient every **2* Hours  -Use appropriate equipment to lift or move patient in bed  -Instruct/ Assist with weight shifting every *20mins** when out of bed in chair  -Consider limitation of chair time **2* hour intervals    Skin Care:  -Avoid use of baby powder, tape, friction and shearing, hot water or constrictive clothing  -Relieve pressure over bony prominences -Do not massage red bony areas    Next Steps:  -Teach patient strategies to minimize risks-Consider consults to  interdisciplinary teams Outcome: Progressing  Goal: Incision(s), wounds(s) or drain site(s) healing without S/S of infection  Description: INTERVENTIONS  - Assess and document dressing, incision, wound bed, drain sites and surrounding tissue  - Provide patient and family education  - Perform skin care/dressing changes   Outcome: Progressing  Goal: Pressure injury heals and does not worsen  Description: Interventions:  - Implement low air loss mattress or specialty surface (Criteria met)  - Apply silicone foam dressing  - Instruct/assist with weight shifting every *20** minutes when in chair   - Limit chair time to *2* hour intervals  - Use special pressure reducing interventions  when in chair   - Apply fecal or urinary incontinence containment device   - Perform passive or active ROM   - Turn and reposition patient & offload bony prominences every **2* hours   - Utilize friction reducing device or surface for transfers   - Consider consults to  interdisciplinary teams - Use incontinent care products after each incontinent episode - Consider nutrition services referral as needed  Outcome: Progressing     Problem: PAIN - ADULT  Goal: Verbalizes/displays adequate comfort level or baseline comfort level  Description: Interventions:  - Encourage patient to monitor pain and request assistance  - Assess pain using appropriate pain scale  - Administer analgesics based on type and severity of pain and evaluate response  - Implement non-pharmacological measures as appropriate and evaluate response  - Consider cultural and social influences on pain and pain management  - Notify physician/advanced practitioner if interventions unsuccessful or patient reports new pain  Outcome: Progressing     Problem: Prexisting or High Potential for Compromised Skin Integrity  Goal: Skin integrity is maintained or improved  Description: INTERVENTIONS:  - Identify patients at risk for skin breakdown  - Assess and monitor skin integrity  - Assess and monitor nutrition and hydration status  - Monitor labs   - Assess for incontinence   - Turn and reposition patient  - Assist with mobility/ambulation  - Relieve pressure over bony prominences  - Avoid friction and shearing  - Provide appropriate hygiene as needed including keeping skin clean and dry  - Evaluate need for skin moisturizer/barrier cream  - Collaborate with interdisciplinary team   - Patient/family teaching  - Consider wound care consult   Outcome: Progressing     Problem: Nutrition/Hydration-ADULT  Goal: Nutrient/Hydration intake appropriate for improving, restoring or maintaining nutritional needs  Description: Monitor and assess patient's nutrition/hydration status for malnutrition  Collaborate with interdisciplinary team and initiate plan and interventions as ordered  Monitor patient's weight and dietary intake as ordered or per policy  Utilize nutrition screening tool and intervene as necessary  Determine patient's food preferences and provide high-protein, high-caloric foods as appropriate       INTERVENTIONS:  - Monitor oral intake, urinary output, labs, and treatment plans  - Assess nutrition and hydration status and recommend course of action  - Evaluate amount of meals eaten  - Assist patient with eating if necessary   - Allow adequate time for meals  - Recommend/ encourage appropriate diets, oral nutritional supplements, and vitamin/mineral supplements  - Order, calculate, and assess calorie counts as needed  - Recommend, monitor, and adjust tube feedings and TPN/PPN based on assessed needs  - Assess need for intravenous fluids  - Provide specific nutrition/hydration education as appropriate  - Include patient/family/caregiver in decisions related to nutrition  Outcome: Progressing

## 2022-05-28 NOTE — PROGRESS NOTES
[facility]  Progress Note - Aaron Hernandez 1940, 78 y o  female MRN: [id number]  Unit/Bed#: [facility] 607-01 Encounter: [id number]  Primary Care Provider: Juliana Mujica MD   Date and time admitted to hospital: 5/25/2022  7:53 PM    Subclavian artery injury  Assessment & Plan  -vascular surgery consulted, appreciate recs  - continue asa 325 mg daily, no intervention required    Closed fracture of one rib of right side  Assessment & Plan  Patient reports fall down 5 stairs proximally, mechanical fall  CT chest abdomen pelvis- "   Mildly displaced fracture of the anterior right 1st rib   "  No carotid bruits bilaterally  Multi modal pain medication engaged  Right clavicle also fractured; currently in right arm sling  Appreciate APS recommendations    Fall down stairs  Assessment & Plan  Patient reports fall down 5 stairs approximately  Head CT negative  Cervical spine CT demonstrates small fracture of the osteophyte of from the left anterior articular in process of the C1 vertebral body, and C6 and C7 right transverse process fractures; right clavicle fracture  Patient self ambulatory with no difficulty; lower extremities with no pain  APS with evaluation treatment; appreciate recommendations    Cervical spine fracture [facility])  Assessment & Plan  Patient reported fall down 5 stairs at home; mechanical fall  CT cervical noncontrast impression-"There is a small fracture of an osteophyte from the left inferior articulating process of the C1 vertebral body  Right C6 transverse process fracture  Right C7 transverse process fracture   "  Neurosurgery with evaluation and treatment appreciate recommendations  Speech evaluation-regular diet and thin liquid with no significant oral dysphagia  Multi modal pain medication in place along with supportive pharmacological bowel regimen  Patient denies numbness tingling; currently neurovascularly intact  Appreciate APS recommendations  Per neurosurgery, can d/c collar; can defer CTA as foramina intact      Closed fracture of right clavicle  Assessment & Plan  Patient with reported fall down 5 stairs at home; mechanical fall  Cervical ct non contrast- impression-   "Right clavicle fracture "  Inpatient consult to acute pain service  PICC line placed; consent form completed and filed  Multi modal pain medication regimen engaged  Right arm sling in place  Orthopedics evaluation and treatment; with non operative management, pain control, right arm sling  Appreciate APS recommendations  Depression with anxiety  Assessment & Plan  Continue home meds        Disposition: Continue current level of care, pending [facility] referrals     SUBJECTIVE:  Chief Complaint: Patient asking about when she can go home    Subjective: Patient states pain is controlled this morning, she is doing well with IS pulling 1100  She states she did have nausea yesterday  Denies nausea or vomiting today  She has pain in the right shoulder  OBJECTIVE:   Vitals:   Temp:  [97 6 °F (36 4 °C)-98 2 °F (36 8 °C)] 97 6 °F (36 4 °C)  HR:  [56-86] 56  Resp:  [16-36] 16  BP: (128-163)/(61-82) 133/66    Intake/Output:  I/O       05/26 0701  05/27 0700 05/27 0701  05/28 0700 05/28 0701  05/29 0700    P  O  225      Total Intake(mL/kg) 225 (3 5)      Urine (mL/kg/hr) 1500 (1)      Total Output 1500      Net -1275             Unmeasured Urine Occurrence  4 x          Nutrition: Diet Regular; Regular House  GI Proph/Bowel Reg: Senna  VTE Prophylaxis:Enoxaparin (Lovenox)     Physical Exam:   GENERAL APPEARANCE: no acute distress  NEURO: Awake and alert, no focal deficits  HEENT: Normocephalic, atraumatic  CV: Regular rate and rhythm, no murmurs, rubs or gallops  LUNGS: clear to auscultation bilaterally  GI: abd soft, non-distended, non-tender  : deferred  MSK: no edema, RUE in sling  SKIN: Warm and dry    Invasive Devices  Report    Peripherally Inserted Central Catheter Line  Duration PICC Line 05/26/22 Left Brachial 1 day                 PIC Score  PIC Pain Score: 3 (5/28/2022  7:46 AM)  PIC Incentive Spirometry Score: Below alert volume (5/28/2022  7:46 AM)  PIC Cough Description: Weak (5/28/2022  7:46 AM)  PIC Total Score: 7 (5/28/2022  7:46 AM)       If the Total PIC Score </=5, did you consult APS and evaluate patient for further intervention?: APS following      Pain:    Incentive Spirometry  Cough  3 = Controlled  4 = Above goal volume 3 = Strong  2 = Moderate  3 = Goal to alert volume 2 = Weak  1 = Severe  2 = Below alert volume 1 = Absent     1 = Unable to perform IS         Lab Results: BMP/CMP: No results found for: SODIUM, K, CL, CO2, ANIONGAP, BUN, CREATININE, GLUCOSE, CALCIUM, AST, ALT, ALKPHOS, PROT, BILITOT, EGFR and CBC: No results found for: WBC, HGB, HCT, MCV, PLT, ADJUSTEDWBC, MCH, MCHC, RDW, MPV, NRBC  Imaging/EKG Studies: 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

## 2022-05-29 VITALS
TEMPERATURE: 98.3 F | HEIGHT: 66 IN | SYSTOLIC BLOOD PRESSURE: 150 MMHG | BODY MASS INDEX: 22.71 KG/M2 | DIASTOLIC BLOOD PRESSURE: 64 MMHG | HEART RATE: 77 BPM | OXYGEN SATURATION: 95 % | RESPIRATION RATE: 18 BRPM | WEIGHT: 141.31 LBS

## 2022-05-29 LAB
ATRIAL RATE: 60 BPM
P AXIS: 64 DEGREES
PR INTERVAL: 186 MS
QRS AXIS: 82 DEGREES
QRSD INTERVAL: 92 MS
QT INTERVAL: 402 MS
QTC INTERVAL: 402 MS
T WAVE AXIS: 80 DEGREES
VENTRICULAR RATE: 60 BPM

## 2022-05-29 PROCEDURE — 99238 HOSP IP/OBS DSCHRG MGMT 30/<: CPT | Performed by: SURGERY

## 2022-05-29 PROCEDURE — NC001 PR NO CHARGE: Performed by: SURGERY

## 2022-05-29 PROCEDURE — 93010 ELECTROCARDIOGRAM REPORT: CPT | Performed by: INTERNAL MEDICINE

## 2022-05-29 RX ORDER — ASPIRIN 325 MG
325 TABLET ORAL DAILY
Qty: 30 TABLET | Refills: 0 | Status: SHIPPED | OUTPATIENT
Start: 2022-05-29

## 2022-05-29 RX ORDER — HYDROMORPHONE HYDROCHLORIDE 2 MG/1
2 TABLET ORAL EVERY 4 HOURS PRN
Qty: 15 TABLET | Refills: 0 | Status: SHIPPED | OUTPATIENT
Start: 2022-05-29 | End: 2022-06-08

## 2022-05-29 RX ADMIN — DULOXETINE HYDROCHLORIDE 60 MG: 60 CAPSULE, DELAYED RELEASE ORAL at 10:21

## 2022-05-29 RX ADMIN — HYDROMORPHONE HYDROCHLORIDE 2 MG: 2 TABLET ORAL at 04:18

## 2022-05-29 RX ADMIN — SENNOSIDES AND DOCUSATE SODIUM 1 TABLET: 50; 8.6 TABLET ORAL at 10:21

## 2022-05-29 RX ADMIN — ASPIRIN 325 MG ORAL TABLET 325 MG: 325 PILL ORAL at 10:21

## 2022-05-29 RX ADMIN — ACETAMINOPHEN 975 MG: 325 TABLET, FILM COATED ORAL at 10:21

## 2022-05-29 RX ADMIN — LEVOTHYROXINE SODIUM 88 MCG: 88 TABLET ORAL at 10:23

## 2022-05-29 RX ADMIN — EZETIMIBE 10 MG: 10 TABLET ORAL at 10:21

## 2022-05-29 RX ADMIN — ENOXAPARIN SODIUM 30 MG: 30 INJECTION SUBCUTANEOUS at 04:18

## 2022-05-29 RX ADMIN — TOPIRAMATE 50 MG: 25 TABLET, FILM COATED ORAL at 10:22

## 2022-05-29 NOTE — CASE MANAGEMENT
Case Management Discharge Planning Note    Patient name Ronn Harm  Location [address] [facility] [address] [facility] [address] 607/PPHP 207-26 MRN [id number]  : 1940 Date 2022       Current Admission Date: 2022  Current Admission Diagnosis:Fall down stairs   Patient Active Problem List    Diagnosis Date Noted    Subclavian artery injury 2022    Closed fracture of right clavicle 2022    C[facility] spine fracture ([facility] ) 2022    Fall down stairs 2022    Closed fracture of one rib of right side 2022    Acute pain due to trauma 2022    Chronic pain syndrome 2021    Sjogren's syndrome without extraglandular involvement ([facility] ) 2021    Raynaud's disease without gangrene 2021    Spinal stenosis of lumbosacral region 2021    Left foot drop 2020    Neuropathy 2018    Lumbar radiculopathy 2018    History of DVT (deep vein thrombosis) 12/15/2016    Statin intolerance 12/15/2016    Solitary fibrous tumor 2016    S/P Lumbar laminectomy and decompressio L2-S1 2016    Hypertension 2016    Fibromyalgia     Hyperlipidemia     Migraine     Lumbar stenosis with neurogenic claudication 2016    Chronic osteomyelitis of left foot ([facility] ) 2015    PAF (paroxysmal atrial fibrillation) ([place] [address] ) 2015    Lyme disease 11/10/2015    Peripheral neuropathy, hereditary/idiopathic 10/29/2015    Pleural nodules 10/06/2015    Varicose veins 2015    Lung mass 2015    Knee osteoarthritis 2015    Abnormal chest x-ray 2015    Back pain 2015    Generalized pain 2015    Tinnitus 2015    Headache 2012    Depression with anxiety 2012    Esophageal reflux 2012    Impaired fasting glucose 2012    Insomnia 2012    Memory loss 2012    Hypothyroidism 2012      LOS (days): 3  Geometric Mean LOS (GMLOS) (days): 2 90  Days to GMLOS:-0 4     OBJECTIVE:  Risk of Unplanned Readmission Score: 8 27         Current admission status: Inpatient   Preferred Pharmacy:   [facility]  [address] [place] [address]  Phone: [phone] Fax: [address]  [address], [address]  [address] [place] [address]  Phone: [phone] Fax: [phone]    Primary Care Provider: Pauline Hudson MD    Primary Insurance: MEDICARE  Secondary Insurance: Sharon Lynch    DISCHARGE DETAILS:    Contacts  Patient Contacts: Marga Evans (Son)  Relationship to Patient[de-identified] Family  Contact Method: Phone  Phone Number: [phone] Alana Sportsman)  Reason/Outcome: Discharge Planning, Referral    Requested [facility] requested[de-identified] Occupational Therapy, Physical [address] Name[de-identified] [facility] Provider[de-identified] PCP  Home Health Services Needed[de-identified] Evaluate Functional Status and Safety, Gait/ADL Training, Strengthening/Theraputic Exercises to Improve Function  Homebound Criteria Met[de-identified] Requires the Assistance of Another Person for Safe Ambulation or to Leave the Home, Uses an Assist Device (i e  cane, walker, etc)  Supporting Clincal Findings[de-identified] Limited Endurance, Fatigues Easliy in [employer]    Other Referral/Resources/Interventions Provided:  Interventions: [facility]  Referral Comments: Patient accepted by [facility] for after care services: PT/OT  CM informed patient of same who request [facility] with -[facility] added to AVS  Per communication with trauma CRNP patient is medically ready for d/c home today  CM informed patients son of same who informed he will provide transportation home upon d/c  Son also reports his stepdaughter will be staying with patient upon d/c for additional support in the home  Patient son also resides next door and checks on patient daily  No further CM needs identified at this time   CM team will continue to follow as needed       Patient recommended [facility] for d/c, information provided on where to purchase cane and tentative pricing     Treatment Team Recommendation: Home with [address] [facility]  Discharge Destination Plan[de-identified] Home with [address] [facility]: PT/OT )

## 2022-05-29 NOTE — CASE MANAGEMENT
Case Management Discharge Planning Note    Patient name Janae Andre  Location [address] 607/PPHP 094-40 MRN [id number]  : 1940 Date 2022       Current Admission Date: 2022  Current Admission Diagnosis:Fall down stairs   Patient Active Problem List    Diagnosis Date Noted    Subclavian artery injury 2022    Closed fracture of right clavicle 2022    Cervical spine fracture ([facility] ) 2022    Fall down stairs 2022    Closed fracture of one rib of right side 2022    Acute pain due to trauma 2022    Chronic pain syndrome 2021    Sjogren's syndrome without extraglandular involvement ([facility] ) 2021    Raynaud's disease without gangrene 2021    Spinal stenosis of lumbosacral region 2021    Left foot drop 2020    Neuropathy 2018    Lumbar radiculopathy 2018    History of DVT (deep vein thrombosis) 12/15/2016    Statin intolerance 12/15/2016    Solitary fibrous tumor 2016    S/P Lumbar laminectomy and decompressio L2-S1 2016    Hypertension 2016    Fibromyalgia     Hyperlipidemia     Migraine     Lumbar stenosis with neurogenic claudication 2016    Chronic osteomyelitis of left foot ([facility] ) 2015    PAF (paroxysmal atrial fibrillation) ([facility] ) 2015    Lyme disease 11/10/2015    Peripheral neuropathy, hereditary/idiopathic 10/29/2015    Pleural nodules 10/06/2015    Varicose veins 2015    Lung mass 2015    Knee osteoarthritis 2015    Abnormal chest x-ray 2015    Back pain 2015    Generalized pain 2015    Tinnitus 2015    Headache 2012    Depression with anxiety 2012    Esophageal reflux 2012    Impaired fasting glucose 2012    Insomnia 2012    Memory loss 2012    Hypothyroidism 2012      LOS (days): 3  Geometric Mean LOS (GMLOS) (days): 2 90  Days to GMLOS:-0 4     OBJECTIVE:  Risk of Unplanned Readmission Score: 9 91         Current admission status: Inpatient   Preferred Pharmacy:   [address] [place] [address] 133[address]  [address] [place] [id number]  Phone: [phone] Fax: [phone] [address], [address]  [address] [place] [address]  Phone: [phone] Fax: [phone]    Primary Care Provider: Nadia Squires MD    Primary Insurance: MEDICARE  Secondary Insurance: [employer]    DISCHARGE DETAILS:    Contacts  Patient Contacts: Gabi Stern  Relationship to Patient[de-identified] Family  Contact Method: Phone  Phone Number: [phone]  Reason/Outcome: Discharge Planning, Referral     Other Referral/Resources/Interventions Provided:  Referral Comments: Spoke with son and patient regarding [facility] recommendation  Options for purchasing Quad cane discussed  Son informed he will purshase quad cane through [employer] and declined the need for referral through [facility] at this time  Pt provided single point cane for d/c home untill quad cane can be purchased          Treatment Team Recommendation: Home with [address] [facility]  Discharge Destination Plan[de-identified] Home with [address] [facility]

## 2022-05-29 NOTE — PLAN OF CARE
Problem: Potential for Falls  Goal: Patient will remain free of falls  Description: INTERVENTIONS:  - Educate patient/family on patient safety including physical limitations  - Instruct patient to call for assistance with activity   - Consult OT/PT to assist with strengthening/mobility   - Keep Call bell within reach  - Keep bed low and locked with side rails adjusted as appropriate  - Keep care items and personal belongings within reach  - Initiate and maintain comfort rounds  - Make Fall Risk Sign visible to staff  - Offer Toileting every *2** Hours, in advance of need  - Initiate/Maintain **bed/chair*alarm  - Obtain necessary fall risk management equipment:   - Apply yellow socks and bracelet for high fall risk patients  - Consider moving patient to room near nurses station  5/29/2022 1423 by Leisa Nino RN  Outcome: Adequate for Discharge  5/29/2022 1357 by Leisa Nino RN  Outcome: Progressing     Problem: MOBILITY - ADULT  Goal: Maintain or return to baseline ADL function  Description: INTERVENTIONS:  -  Assess patient's ability to carry out ADLs; assess patient's baseline for ADL function and identify physical deficits which impact ability to perform ADLs (bathing, care of mouth/teeth, toileting, grooming, dressing, etc )  - Assess/evaluate cause of self-care deficits   - Assess range of motion  - Assess patient's mobility; develop plan if impaired  - Assess patient's need for assistive devices and provide as appropriate  - Encourage maximum independence but intervene and supervise when necessary  - Involve family in performance of ADLs  - Assess for home care needs following discharge   - Consider OT consult to assist with ADL evaluation and planning for discharge  - Provide patient education as appropriate  5/29/2022 1423 by Leisa Nino RN  Outcome: Adequate for Discharge  5/29/2022 1357 by Leisa Nino RN  Outcome: Progressing  Goal: Maintains/Returns to pre admission functional level  Description: INTERVENTIONS:  - Perform BMAT or MOVE assessment daily    - Set and communicate daily mobility goal to care team and patient/family/caregiver  - Collaborate with rehabilitation services on mobility goals if consulted  - Perform Range of Motion *3** times a day  - Reposition patient every *2** hours  - Dangle patient **3* times a day  - Stand patient **3* times a day  - Ambulate patient **3* times a day  - Out of bed to chair *3** times a day   - Out of bed for meals *3** times a day  - Out of bed for toileting  - Record patient progress and toleration of activity level   5/29/2022 1423 by Sheyla Duque RN  Outcome: Adequate for Discharge  5/29/2022 1357 by Sheyla Duque RN  Outcome: Progressing     Problem: NEUROSENSORY - ADULT  Goal: Achieves stable or improved neurological status  Description: INTERVENTIONS  - Monitor and report changes in neurological status  - Monitor vital signs such as temperature, blood pressure, glucose, and any other labs ordered   - Initiate measures to prevent increased intracranial pressure  - Monitor for seizure activity and implement precautions if appropriate      5/29/2022 1423 by Sheyla Duque RN  Outcome: Adequate for Discharge  5/29/2022 1357 by Sheyla Duque RN  Outcome: Progressing  Goal: Achieves maximal functionality and self care  Description: INTERVENTIONS  - Monitor swallowing and airway patency with patient fatigue and changes in neurological status  - Encourage and assist patient to increase activity and self care     - Encourage visually impaired, hearing impaired and aphasic patients to use assistive/communication devices  5/29/2022 1423 by Sheyla Duque RN  Outcome: Adequate for Discharge  5/29/2022 1357 by Sheyla Duque RN  Outcome: Progressing     Problem: RESPIRATORY - ADULT  Goal: Achieves optimal ventilation and oxygenation  Description: INTERVENTIONS:  - Assess for changes in respiratory status  - Assess for changes in mentation and behavior  - Position to facilitate oxygenation and minimize respiratory effort  - Oxygen administered by appropriate delivery if ordered  - Initiate smoking cessation education as indicated  - Encourage broncho-pulmonary hygiene including cough, deep breathe, Incentive Spirometry  - Assess the need for suctioning and aspirate as needed  - Assess and instruct to report SOB or any respiratory difficulty  - Respiratory Therapy support as indicated  5/29/2022 1423 by Romina Padilla RN  Outcome: Adequate for Discharge  5/29/2022 1357 by Romina Padilla RN  Outcome: Progressing     Problem: MUSCULOSKELETAL - ADULT  Goal: Maintain or return mobility to safest level of function  Description: INTERVENTIONS:  - Assess patient's ability to carry out ADLs; assess patient's baseline for ADL function and identify physical deficits which impact ability to perform ADLs (bathing, care of mouth/teeth, toileting, grooming, dressing, etc )  - Assess/evaluate cause of self-care deficits   - Assess range of motion  - Assess patient's mobility  - Assess patient's need for assistive devices and provide as appropriate  - Encourage maximum independence but intervene and supervise when necessary  - Involve family in performance of ADLs  - Assess for home care needs following discharge   - Consider OT consult to assist with ADL evaluation and planning for discharge  - Provide patient education as appropriate  5/29/2022 1423 by Romina Padilla RN  Outcome: Adequate for Discharge  5/29/2022 1357 by Romina Padilla RN  Outcome: Progressing  Goal: Maintain proper alignment of affected body part  Description: INTERVENTIONS:  - Support, maintain and protect limb and body alignment  - Provide patient/ family with appropriate education  5/29/2022 1423 by Romina Padilla RN  Outcome: Adequate for Discharge  5/29/2022 1357 by Romina Padilla RN  Outcome: Progressing     Problem: SKIN/TISSUE INTEGRITY - ADULT  Goal: Skin Integrity remains intact(Skin Breakdown Prevention)  Description: Assess:  -Perform Jaya assessment -Clean and moisturize skin-Inspect skin when repositioning, toileting, and assisting with ADLS  -Assess under medical devices such -Assess extremities for adequate circulation and sensation     Bed Management:  -Have minimal linens on bed & keep smooth, unwrinkled  -Change linens as needed when moist or perspiring  -Avoid sitting or lying in one position for more than 2 hours while in bed  -Keep HOB at *30**degrees     Toileting:  -Offer bedside commode  -Assess for incontinence -Use incontinent care products after each incontinent episode   Activity:  -Mobilize patient *3** times a day  -Encourage activity and walks on unit  -Encourage or provide ROM exercises   -Turn and reposition patient every *2** Hours  -Use appropriate equipment to lift or move patient in bed  -Instruct/ Assist with weight shifting every *20** when out of bed in chair  -Consider limitation of chair time *2** hour intervals    Skin Care:  -Avoid use of baby powder, tape, friction and shearing, hot water or constrictive clothing  -Relieve pressure over bony prominences Do not massage red bony areas    Next Steps:  -Teach patient strategies to minimize risks-Consider consults to  interdisciplinary teams 5/29/2022 1423 by Kelsea Echevarria RN  Outcome: Adequate for Discharge  5/29/2022 1357 by Kelsea Echevarria RN  Outcome: Progressing  Goal: Incision(s), wounds(s) or drain site(s) healing without S/S of infection  Description: INTERVENTIONS  - Assess and document dressing, incision, wound bed, drain sites and surrounding tissue  - Provide patient and family education  - Perform skin care/dressing changes 5/29/2022 1423 by Kelsea Echevarria RN  Outcome: Adequate for Discharge  5/29/2022 1357 by Kelsea Echevarria RN  Outcome: Progressing  Goal: Pressure injury heals and does not worsen  Description: Interventions:  - Implement low air loss mattress or specialty surface (Criteria met)  - Apply silicone foam dressing  - Instruct/assist with weight shifting every *20** minutes when in chair   - Limit chair time to **2* hour intervals  - Use special pressure reducing interventions when in chair   - Apply fecal or urinary incontinence containment device   - Perform passive or active ROM- Turn and reposition patient & offload bony prominences- Utilize friction reducing device or surface for transfers   - Consider consults to  interdisciplinary teams - Use incontinent care products after each incontinent episode   - Consider nutrition services referral as needed  5/29/2022 1423 by New Avina RN  Outcome: Adequate for Discharge  5/29/2022 1357 by New Avina RN  Outcome: Progressing     Problem: PAIN - ADULT  Goal: Verbalizes/displays adequate comfort level or baseline comfort level  Description: Interventions:  - Encourage patient to monitor pain and request assistance  - Assess pain using appropriate pain scale  - Administer analgesics based on type and severity of pain and evaluate response  - Implement non-pharmacological measures as appropriate and evaluate response  - Consider cultural and social influences on pain and pain management  - Notify physician/advanced practitioner if interventions unsuccessful or patient reports new pain  5/29/2022 1423 by New Avina RN  Outcome: Adequate for Discharge  5/29/2022 1357 by New Avina RN  Outcome: Progressing     Problem: Prexisting or High Potential for Compromised Skin Integrity  Goal: Skin integrity is maintained or improved  Description: INTERVENTIONS:  - Identify patients at risk for skin breakdown  - Assess and monitor skin integrity  - Assess and monitor nutrition and hydration status  - Monitor labs   - Assess for incontinence   - Turn and reposition patient  - Assist with mobility/ambulation  - Relieve pressure over bony prominences  - Avoid friction and shearing  - Provide appropriate hygiene as needed including keeping skin clean and dry  - Evaluate need for skin moisturizer/barrier cream  - Collaborate with interdisciplinary team   - Patient/family teaching  - Consider wound care consult   5/29/2022 1423 by Apple Garcias RN  Outcome: Adequate for Discharge  5/29/2022 1357 by Apple Garcias RN  Outcome: Progressing     Problem: Nutrition/Hydration-ADULT  Goal: Nutrient/Hydration intake appropriate for improving, restoring or maintaining nutritional needs  Description: Monitor and assess patient's nutrition/hydration status for malnutrition  Collaborate with interdisciplinary team and initiate plan and interventions as ordered  Monitor patient's weight and dietary intake as ordered or per policy  Utilize nutrition screening tool and intervene as necessary  Determine patient's food preferences and provide high-protein, high-caloric foods as appropriate       INTERVENTIONS:  - Monitor oral intake, urinary output, labs, and treatment plans  - Assess nutrition and hydration status and recommend course of action  - Evaluate amount of meals eaten  - Assist patient with eating if necessary   - Allow adequate time for meals  - Recommend/ encourage appropriate diets, oral nutritional supplements, and vitamin/mineral supplements  - Order, calculate, and assess calorie counts as needed  - Recommend, monitor, and adjust tube feedings and TPN/PPN based on assessed needs  - Assess need for intravenous fluids  - Provide specific nutrition/hydration education as appropriate  - Include patient/family/caregiver in decisions related to nutrition  5/29/2022 1423 by Apple Garcias RN  Outcome: Adequate for Discharge  5/29/2022 1357 by Apple Garcias RN  Outcome: Progressing

## 2022-05-29 NOTE — DISCHARGE SUMMARY
Discharge Summary - Shine Blair 78 y o  female MRN: [id number]    Unit/Bed#: [facility] 607-01 Encounter: [id number]    Admission Date:   Admission Orders (From admission, onward)     Ordered        05/26/22 0408  Inpatient Admission  Once                        Admitting Diagnosis: Clavicle fracture [S42 009A]  Frailty [R54]  Closed fracture of one rib of right side, initial encounter [S22 31XA]  Closed nondisplaced fracture of first cervical vertebra, unspecified fracture morphology, [facility] ([facility] ) [S12 001A]  Closed fracture of right scapula, unspecified part of scapula, initial encounter [S42 101A]  Unspecified multiple injuries, initial encounter [T07  XXXA]    HPI: Shine Blair is a 78 y o  female who presents to the ED after mechanical fall down 5 stairs at home  She was able to right herself without prolonged down time  CT C-spine showed C1 vertebral body fracture, C6 and C7 transverse process fractures  Deformity to right clavicle noted, fracture confirmed by x-ray  Trauma team was consulted followed by CT chest abdomen pelvis without contrast demonstrating right first rib and right scapular fractures  CTA neck scan was not obtained secondary to IV extravasation to the left upper extremity  Another IV was placed under US guidance but CTA deferred for time being  Procedures Performed:   Orders Placed This Encounter   Procedures    ED ECG Documentation Only    POC Cardiac US       Summary of Hospital Course: Patient was admitted to the trauma service after a fall with C1 vertebral body fracture, C6 and C7 transverse process fractures, right first rib fracture, right clavicle fracture and right scapular fracture  Neurosurgery was consulted for the vertebral fractures and recommended non-operative management with no c-collar required   Orthopedic surgery was consulted for the right clavicle and scapular fractures and recommended non-operative management, non-weightbearing status in right upper extremity and plan for outpatient follow up in 2 weeks with repeat XR  A CTA of the head and neck was obtained given the cervical spine fractures and the right first rib fracture  This showed a possible mild right subclavian artery injury with possible mural thrombus so Vascular surgery was consulted  They recommended non-operative management and 325mg ASA daily  APS was consulted for assistance with pain control and geriatrics was consulted for assistance with med review and management of comorbidities and frailty  She was evaluated by PT/OT who recommended rehab but patient was refusing rehab so she was set up for home PT  On HD4, pain was controlled on oral medications and she was deemed appropriate for discharge by all consulting services  Significant Findings, Care, Treatment and Services Provided:  No operative interventions    Complications: None    Discharge Diagnosis: Right clavicle fracture, Right scapular fracture, C1 fracture, C6-7 transverse process fractures, Subclavian artery injury    Medical Problems             Resolved Problems  Date Reviewed: 5/27/2022   None                 Condition at Discharge: good         Discharge instructions/Information to patient and family:   See after visit summary for information provided to patient and family  Provisions for Follow-Up Care:  See after visit summary for information related to follow-up care and any pertinent home health orders  PCP: Kaleigh Felder MD    Disposition: Home    Planned Readmission: No      Discharge Statement   I spent 30 minutes discharging the patient  This time was spent on the day of discharge  I had direct contact with the patient on the day of discharge  Additional documentation is required if more than 30 minutes were spent on discharge  Discharge Medications:  See after visit summary for reconciled discharge medications provided to patient and family

## 2022-05-29 NOTE — ASSESSMENT & PLAN NOTE
Patient with reported fall down 5 stairs at home; mechanical fall  Cervical ct non contrast- impression-   "Right clavicle fracture "  Inpatient consult to acute pain service  Multi modal pain medication regimen engaged  Right arm sling in place  Orthopedics evaluation and treatment; with non operative management, pain control, right arm sling  Appreciate APS recommendations  PT/OT recommending rehab, patient currently refusing, amenable to [facility], referrals pending

## 2022-05-29 NOTE — PLAN OF CARE
Problem: Potential for Falls  Goal: Patient will remain free of falls  Description: INTERVENTIONS:  - Educate patient/family on patient safety including physical limitations  - Instruct patient to call for assistance with activity   - Consult OT/PT to assist with strengthening/mobility   - Keep Call bell within reach  - Keep bed low and locked with side rails adjusted as appropriate  - Keep care items and personal belongings within reach  - Initiate and maintain comfort rounds  - Make Fall Risk Sign visible to staff  - Offer Toileting every **2* Hours, in advance of need  - Initiate/Maintain **bed/chair*alarm  - Obtain necessary fall risk management equipment:   - Apply yellow socks and bracelet for high fall risk patients  - Consider moving patient to room near nurses station  Outcome: Progressing     Problem: MOBILITY - ADULT  Goal: Maintain or return to baseline ADL function  Description: INTERVENTIONS:  -  Assess patient's ability to carry out ADLs; assess patient's baseline for ADL function and identify physical deficits which impact ability to perform ADLs (bathing, care of mouth/teeth, toileting, grooming, dressing, etc )  - Assess/evaluate cause of self-care deficits   - Assess range of motion  - Assess patient's mobility; develop plan if impaired  - Assess patient's need for assistive devices and provide as appropriate  - Encourage maximum independence but intervene and supervise when necessary  - Involve family in performance of ADLs  - Assess for home care needs following discharge   - Consider OT consult to assist with ADL evaluation and planning for discharge  - Provide patient education as appropriate  Outcome: Progressing  Goal: Maintains/Returns to pre admission functional level  Description: INTERVENTIONS:  - Perform BMAT or MOVE assessment daily    - Set and communicate daily mobility goal to care team and patient/family/caregiver     - Collaborate with rehabilitation services on mobility goals if consulted  - Perform Range of Motion *3** times a day  - Reposition patient every *2** hours  - Dangle patient *3** times a day  - Stand patient **3* times a day  - Ambulate patient *3** times a day  - Out of bed to chair **3* times a day   - Out of bed for meals *3** times a day  - Out of bed for toileting  - Record patient progress and toleration of activity level   Outcome: Progressing     Problem: NEUROSENSORY - ADULT  Goal: Achieves stable or improved neurological status  Description: INTERVENTIONS  - Monitor and report changes in neurological status  - Monitor vital signs such as temperature, blood pressure, glucose, and any other labs ordered   - Initiate measures to prevent increased intracranial pressure  - Monitor for seizure activity and implement precautions if appropriate      Outcome: Progressing  Goal: Achieves maximal functionality and self care  Description: INTERVENTIONS  - Monitor swallowing and airway patency with patient fatigue and changes in neurological status  - Encourage and assist patient to increase activity and self care     - Encourage visually impaired, hearing impaired and aphasic patients to use assistive/communication devices  Outcome: Progressing     Problem: RESPIRATORY - ADULT  Goal: Achieves optimal ventilation and oxygenation  Description: INTERVENTIONS:  - Assess for changes in respiratory status  - Assess for changes in mentation and behavior  - Position to facilitate oxygenation and minimize respiratory effort  - Oxygen administered by appropriate delivery if ordered  - Initiate smoking cessation education as indicated  - Encourage broncho-pulmonary hygiene including cough, deep breathe, Incentive Spirometry  - Assess the need for suctioning and aspirate as needed  - Assess and instruct to report SOB or any respiratory difficulty  - Respiratory Therapy support as indicated  Outcome: Progressing     Problem: SKIN/TISSUE INTEGRITY - ADULT  Goal: Skin Integrity remains intact(Skin Breakdown Prevention)  Description: Assess:  -Perform Jaya assessment -Clean and moisturize skin -Inspect skin when repositioning, toileting, and assisting with ADLS  -Assess under medical devices  -Assess extremities for adequate circulation and sensation     Bed Management:  -Have minimal linens on bed & keep smooth, unwrinkled  -Change linens as needed when moist or perspiring  -Avoid sitting or lying in one position for more than **2* hours while in bed  -Keep HOB at **30*degrees     Toileting:  -Offer bedside commode   every 2hrs  -Use incontinent care products after each incontinent episode  Activity:  -Mobilize patient **3* times a day  -Encourage activity and walks on unit  -Encourage or provide ROM exercises   -Turn and reposition patient every **2* Hours  -Use appropriate equipment to lift or move patient in bed  -Instruct/ Assist with weight shifting every **20mins* when out of bed in chair  -Consider limitation of chair time **2* hour intervals    Skin Care:  -Avoid use of baby powder, tape, friction and shearing, hot water or constrictive clothing  -Relieve pressure over bony prominences -Do not massage red bony areas    Next Steps:  -Teach patient strategies to minimize risks -Consider consults to  interdisciplinary teams Outcome: Progressing  Goal: Incision(s), wounds(s) or drain site(s) healing without S/S of infection  Description: INTERVENTIONS  - Assess and document dressing, incision, wound bed, drain sites and surrounding tissue  - Provide patient and family education  - Perform skin care/dressing changesOutcome: Progressing  Goal: Pressure injury heals and does not worsen  Description: Interventions:  - Implement low air loss mattress or specialty surface (Criteria met)  - Apply silicone foam dressing  - Instruct/assist with weight shifting every **20* minutes when in chair   - Limit chair time to *2* hour intervals  - Use special pressure reducing interventions when in chair   - Apply fecal or urinary incontinence containment device   - Perform passive or active ROM - Turn and reposition patient & offload bony prominences - Utilize friction reducing device or surface for transfers   - Consider consults to  interdisciplinary teams- Use incontinent care products after each incontinent episode   - Consider nutrition services referral as needed  Outcome: Progressing     Problem: PAIN - ADULT  Goal: Verbalizes/displays adequate comfort level or baseline comfort level  Description: Interventions:  - Encourage patient to monitor pain and request assistance  - Assess pain using appropriate pain scale  - Administer analgesics based on type and severity of pain and evaluate response  - Implement non-pharmacological measures as appropriate and evaluate response  - Consider cultural and social influences on pain and pain management  - Notify physician/advanced practitioner if interventions unsuccessful or patient reports new pain  Outcome: Progressing     Problem: Prexisting or High Potential for Compromised Skin Integrity  Goal: Skin integrity is maintained or improved  Description: INTERVENTIONS:  - Identify patients at risk for skin breakdown  - Assess and monitor skin integrity  - Assess and monitor nutrition and hydration status  - Monitor labs   - Assess for incontinence   - Turn and reposition patient  - Assist with mobility/ambulation  - Relieve pressure over bony prominences  - Avoid friction and shearing  - Provide appropriate hygiene as needed including keeping skin clean and dry  - Evaluate need for skin moisturizer/barrier cream  - Collaborate with interdisciplinary team   - Patient/family teaching  - Consider wound care consult   Outcome: Progressing     Problem: Nutrition/Hydration-ADULT  Goal: Nutrient/Hydration intake appropriate for improving, restoring or maintaining nutritional needs  Description: Monitor and assess patient's nutrition/hydration status for malnutrition   Collaborate with interdisciplinary team and initiate plan and interventions as ordered  Monitor patient's weight and dietary intake as ordered or per policy  Utilize nutrition screening tool and intervene as necessary  Determine patient's food preferences and provide high-protein, high-caloric foods as appropriate       INTERVENTIONS:  - Monitor oral intake, urinary output, labs, and treatment plans  - Assess nutrition and hydration status and recommend course of action  - Evaluate amount of meals eaten  - Assist patient with eating if necessary   - Allow adequate time for meals  - Recommend/ encourage appropriate diets, oral nutritional supplements, and vitamin/mineral supplements  - Order, calculate, and assess calorie counts as needed  - Recommend, monitor, and adjust tube feedings and TPN/PPN based on assessed needs  - Assess need for intravenous fluids  - Provide specific nutrition/hydration education as appropriate  - Include patient/family/caregiver in decisions related to nutrition  Outcome: Progressing

## 2022-05-29 NOTE — ASSESSMENT & PLAN NOTE
Patient reported fall down 5 stairs at home; mechanical fall  CT cervical noncontrast impression-"There is a small fracture of an osteophyte from the left inferior articulating process of the C1 vertebral body  Right C6 transverse process fracture  Right C7 transverse process fracture   "  Neurosurgery with evaluation and treatment appreciate recommendations  Speech evaluation-regular diet and thin liquid with no significant oral dysphagia  Multi modal pain medication in place along with supportive pharmacological bowel regimen  Patient denies numbness tingling; currently neurovascularly intact  Appreciate APS recommendations  Per neurosurgery, can d/c collar; can defer CTA as foramina intact, signed off

## 2022-05-29 NOTE — ASSESSMENT & PLAN NOTE
Patient reports fall down 5 stairs approximately  Head CT negative  Cervical spine CT demonstrates small fracture of the osteophyte of from the left anterior articular in process of the C1 vertebral body, and C6 and C7 right transverse process fractures; right clavicle fracture  Patient self ambulatory with no difficulty; lower extremities with no pain  APS with evaluation treatment; appreciate recommendations  PT/OT recommending rehab, currently refusing, amenable to [facility], referrals pending

## 2022-05-29 NOTE — PROGRESS NOTES
[facility]  Progress Note - Dumont Oas 1940, 78 y o  female MRN: [id number]  Unit/Bed#: [facility] 607-01 Encounter: [id number]  Primary Care Provider: Isidro Langston MD   Date and time admitted to hospital: 5/25/2022  7:53 PM    Subclavian artery injury  Assessment & Plan  -vascular surgery consulted, appreciate recs  - continue asa 325 mg daily, no intervention required    Acute pain due to trauma  Assessment & Plan  APS consulted, appreciate recommendations  Multimodal analgesia    Closed fracture of one rib of right side  Assessment & Plan  Patient reports fall down 5 stairs proximally, mechanical fall  CT chest abdomen pelvis- "   Mildly displaced fracture of the anterior right 1st rib   "  CTA showed right subclavian intimal tear vs thrombus  Vascular surgery consulted, no intervention, continue aspirin 325mg daily  No carotid bruits bilaterally  Multi modal pain medication engaged  Right clavicle also fractured; currently in right arm sling  Appreciate APS recommendations    Fall down stairs  Assessment & Plan  Patient reports fall down 5 stairs approximately  Head CT negative  Cervical spine CT demonstrates small fracture of the osteophyte of from the left anterior articular in process of the C1 vertebral body, and C6 and C7 right transverse process fractures; right clavicle fracture  Patient self ambulatory with no difficulty; lower extremities with no pain  APS with evaluation treatment; appreciate recommendations  PT/OT recommending rehab, currently refusing, amenable to [facility], referrals pending    Cervical spine fracture [facility])  Assessment & Plan  Patient reported fall down 5 stairs at home; mechanical fall  CT cervical noncontrast impression-"There is a small fracture of an osteophyte from the left inferior articulating process of the C1 vertebral body  Right C6 transverse process fracture  Right C7 transverse process fracture   "  Neurosurgery with evaluation and treatment appreciate recommendations  Speech evaluation-regular diet and thin liquid with no significant oral dysphagia  Multi modal pain medication in place along with supportive pharmacological bowel regimen  Patient denies numbness tingling; currently neurovascularly intact  Appreciate APS recommendations  Per neurosurgery, can d/c collar; can defer CTA as foramina intact, signed off      Closed fracture of right clavicle  Assessment & Plan  Patient with reported fall down 5 stairs at home; mechanical fall  Cervical ct non contrast- impression-   "Right clavicle fracture "  Inpatient consult to acute pain service  Multi modal pain medication regimen engaged  Right arm sling in place  Orthopedics evaluation and treatment; with non operative management, pain control, right arm sling  Appreciate APS recommendations  PT/OT recommending rehab, patient currently refusing, amenable to [address], referrals pending    Depression with anxiety  Assessment & Plan  Continue home meds          Dis[facility]    SUBJECTIVE:  Chief Complaint: C1 fracture, C6-7 transverse process fracture, Right clavicle fracture, right scapular fracture, Right subclavian artery injury    Subjective: No acute events overnight, no new complaints, pain controlled, specifically denies any new numbness or weakness    OBJECTIVE:   Vitals:   Temp:  [97 2 °F (36 2 °C)-98 3 °F (36 8 °C)] 98 3 °F (36 8 °C)  HR:  [57-65] 65  Resp:  [14-18] 16  BP: (104-133)/(53-98) 133/73    Intake/Output:  I/O       05/27 0701  05/28 0700 05/28 0701  05/29 0700 05/29 0701  05/30 0700    P  O   420     Total Intake(mL/kg)  420 (6 6)     Urine (mL/kg/hr)       Total Output       Net  +420            Unmeasured Urine Occurrence 4 x 4 x     Unmeasured Stool Occurrence  1 x          Nutrition: Diet Regular; Regular House  GI Proph/Bowel Reg: Senokot  VTE Prophylaxis:Enoxaparin (Lovenox)     Physical Exam:   Gen:    NAD  CV:      warm, well-perfused  Lungs: No respiratory distress  Abd:     soft, NT/ND  Ext:      no CCE  Neuro: A&Ox3, left foot drop which is chronic, otherwise motor and sensory grossly intact      Invasive Devices  Report    Peripherally Inserted Central Catheter Line  Duration           PICC Line 05/26/22 Left Brachial 2 days                 PIC Score  PIC Pain Score: 2 (5/29/2022  4:18 AM)  PIC Incentive Spirometry Score: Goal to alert volume (5/28/2022 10:00 PM)  PIC Cough Description: Weak (5/28/2022 10:00 PM)  PIC Total Score: 8 (5/28/2022 10:00 PM)       If the Total PIC Score </=5, did you consult APS and evaluate patient for further intervention?: yes      Pain:    Incentive Spirometry  Cough  3 = Controlled  4 = Above goal volume 3 = Strong  2 = Moderate  3 = Goal to alert volume 2 = Weak  1 = Severe  2 = Below alert volume 1 = Absent     1 = Unable to perform IS         Lab Results: Results: I have personally reviewed all pertinent laboratory/tests results  Imaging/EKG Studies: I have personally reviewed pertinent reports       Other Studies: None

## 2022-05-29 NOTE — ASSESSMENT & PLAN NOTE
Patient reports fall down 5 stairs proximally, mechanical fall  CT chest abdomen pelvis- "   Mildly displaced fracture of the anterior right 1st rib   "  CTA showed right subclavian intimal tear vs thrombus  Vascular surgery consulted, no intervention, continue aspirin 325mg daily  No carotid bruits bilaterally  Multi modal pain medication engaged  Right clavicle also fractured; currently in right arm sling  Appreciate APS recommendations

## 2022-05-31 ENCOUNTER — TRANSITIONAL CARE MANAGEMENT (OUTPATIENT)
Dept: FAMILY MEDICINE CLINIC | Facility: CLINIC | Age: 82
End: 2022-05-31

## 2022-05-31 ENCOUNTER — HOME CARE VISIT (OUTPATIENT)
Dept: HOME HEALTH SERVICES | Facility: HOME HEALTHCARE | Age: 82
End: 2022-05-31

## 2022-05-31 NOTE — CASE COMMUNICATION
Numerous attempts to contact patient to schedule [facility] visit  Unable to leave VM on cell phone as message received saying mailbox is full  Text message sent with visit date and time and reason for visit  Went to patients home no answer at door or by phone  [facility] visit delayed until either patient reaches out to this therapist or therapist able to contact patient

## 2022-06-01 ENCOUNTER — OFFICE VISIT (OUTPATIENT)
Dept: FAMILY MEDICINE CLINIC | Facility: CLINIC | Age: 82
End: 2022-06-01
Payer: MEDICARE

## 2022-06-01 VITALS
SYSTOLIC BLOOD PRESSURE: 146 MMHG | RESPIRATION RATE: 20 BRPM | OXYGEN SATURATION: 97 % | DIASTOLIC BLOOD PRESSURE: 76 MMHG | HEIGHT: 66 IN | WEIGHT: 131.6 LBS | TEMPERATURE: 98.9 F | BODY MASS INDEX: 21.15 KG/M2 | HEART RATE: 72 BPM

## 2022-06-01 DIAGNOSIS — Z09 HOSPITAL DISCHARGE FOLLOW-UP: Primary | ICD-10-CM

## 2022-06-01 DIAGNOSIS — S12.9XXD CLOSED FRACTURE OF CERVICAL VERTEBRA, UNSPECIFIED CERVICAL VERTEBRAL LEVEL, SUBSEQUENT ENCOUNTER: ICD-10-CM

## 2022-06-01 DIAGNOSIS — S22.31XD CLOSED FRACTURE OF ONE RIB OF RIGHT SIDE WITH ROUTINE HEALING, SUBSEQUENT ENCOUNTER: ICD-10-CM

## 2022-06-01 DIAGNOSIS — S25.101D: ICD-10-CM

## 2022-06-01 DIAGNOSIS — S42.021D CLOSED DISPLACED FRACTURE OF SHAFT OF RIGHT CLAVICLE WITH ROUTINE HEALING, SUBSEQUENT ENCOUNTER: ICD-10-CM

## 2022-06-01 PROCEDURE — 99496 TRANSJ CARE MGMT HIGH F2F 7D: CPT | Performed by: FAMILY MEDICINE

## 2022-06-01 PROCEDURE — 1111F DSCHRG MED/CURRENT MED MERGE: CPT | Performed by: FAMILY MEDICINE

## 2022-06-01 NOTE — PROGRESS NOTES
Chief Complaint   Patient presents with    Transition of Care Management     Discharged 05/29/22  Health Maintenance   Topic Date Due    DTaP,Tdap,and Td Vaccines (1 - Tdap) Never done    Breast Cancer Screening: Mammogram  Never done    COVID-19 Vaccine (3 - Booster for Ayala Peter series) 08/17/2021    Fall Risk  09/02/2022    Medicare Annual Wellness Visit (AWV)  09/02/2022    Falls: Plan of Care  09/02/2022    BMI: Adult  06/01/2023    Osteoporosis Screening  Completed    Pneumococcal Vaccine: 65+ Years  Completed    Influenza Vaccine  Completed    HIB Vaccine  Aged Out    Hepatitis B Vaccine  Aged Out    IPV Vaccine  Aged Out    Hepatitis A Vaccine  Aged Out    Meningococcal ACWY Vaccine  Aged Out    HPV Vaccine  Aged Out           Assessment/Plan:     Closed fracture of right clavicle  Advised pt to make an appt with orthopedics  Sent Home PT message  Continue dilaudid for pain prn  SE educated pt  Closed fracture of one rib of right side  Continue dilaudid for pain prn  SE educated pt  Cervical spine fracture (HCC)  Continue dilaudid for pain prn  Subclavian artery injury  Continue ASA 325mg daily  Diagnoses and all orders for this visit:    Hospital discharge follow-up  Comments:  Reviewed hospital records  Closed displaced fracture of shaft of right clavicle with routine healing, subsequent encounter    Closed fracture of one rib of right side with routine healing, subsequent encounter    Closed fracture of cervical vertebra, unspecified cervical vertebral level, subsequent encounter    Injury of right subclavian artery, subsequent encounter         Subjective:     Patient ID: Marquis Barrett is a 78 y o  female  HPI    Pt is here with her foster daughter Charmayne Hermes  Was in hospital 5/25-5/29/2022 for clavicle fracture after fall at home  Xray showed right clavicle fracture     CT head normal   Cervical CT showed "There is a small fracture of an osteophyte from the left inferior articulating process of the C1 vertebral body  Right C6 transverse process fracture  Right C7 transverse process fracture       Subcutaneous emphysema along the right subclavian vein; has the patient undergone attempted intravenous line placement in this location      Right clavicle fracture "  CT chest abdomen showed "Comminuted fracture of the right clavicular midshaft  Mildly displaced fracture of the anterior right 1st rib  Nondisplaced fracture of the right scapula (axial image 4, series 2)  Nondisplaced fracture of the right C7 transverse process  Associated  soft tissue swelling in the right chest and supraclavicular region  Small right pleural effusion noted dependently    Large amount of contrast extravasation in the left upper extremity    Pleural-based lesion in the periphery of the left lower lung field measures approximately 2 5 x 1 3 cm in size, increased from the prior  Follow-up PET/CT and/or tissue sampling may be considered at the discretion of the referring caregiver    Subcentimeter nodules in the lungs, as described  Based on current Fleischner Society 2017 Guidelines on incidental pulmonary nodule, no routine follow-up is needed if the patient is low risk  If the patient is high risk, optional follow-up chest CT at 12 months can be considered      Coronary atherosclerosis, left renal cyst, colonic diverticulosis without evidence of acute diverticulitis, and other findings as above "  CTA neck showed "Irregularity and mural thrombus along the mid right subclavian artery is new since the prior examination and in the region of the right clavicular fracture  Findings are suspicious for mild vascular injury versus possibly atherosclerotic disease  No evidence for cervical vascular dissection in the region of the previously described fracture sites "  Neurosurgeon consulted  Recommend non-operative management and no c-collar required  Orthopedics consulted   Recommend non-operative management  Follow up 2 weeks outpatient  Vascular surgeon consulted  Non-operative management and ASA 325mg daily  APS and geriatrics was consulted  Pt refused rehab  Discharged home  Home PT visited her yesterday but she did not answer  Pt states she is afraid of spam calls, so she does not answer the phones  Pt does not take tylenol during the day  Before bedtime pt uses dilaudid 2mg which helped  Review of Systems   Constitutional: Negative for appetite change, chills and fever  HENT: Negative for congestion, ear pain, sinus pain and sore throat  Eyes: Negative for discharge and itching  Respiratory: Negative for apnea, cough, chest tightness, shortness of breath and wheezing  Cardiovascular: Negative for chest pain, palpitations and leg swelling  Gastrointestinal: Negative for abdominal pain, anal bleeding, constipation, diarrhea, nausea and vomiting  Endocrine: Negative for cold intolerance, heat intolerance and polyuria  Genitourinary: Negative for difficulty urinating and dysuria  Musculoskeletal: Positive for back pain, gait problem and neck pain  Negative for arthralgias and myalgias  Skin: Negative for rash  Neurological: Negative for dizziness and headaches  Psychiatric/Behavioral: Negative for agitation  Objective:     Physical Exam  Constitutional:       General: She is not in acute distress  Appearance: She is well-developed  HENT:      Head: Normocephalic  Eyes:      General:         Right eye: No discharge  Left eye: No discharge  Conjunctiva/sclera: Conjunctivae normal    Neck:      Thyroid: No thyromegaly  Cardiovascular:      Rate and Rhythm: Normal rate and regular rhythm  Heart sounds: Normal heart sounds  No murmur heard  No friction rub  No gallop  Pulmonary:      Effort: Pulmonary effort is normal  No respiratory distress  Breath sounds: Normal breath sounds  No wheezing or rales     Chest: Chest wall: No tenderness  Abdominal:      General: Bowel sounds are normal  There is no distension  Palpations: Abdomen is soft  There is no mass  Tenderness: There is no abdominal tenderness  There is no guarding or rebound  Musculoskeletal:         General: Tenderness present  No deformity  Cervical back: Normal range of motion  No tenderness  Comments: Use cane  Right clavicle bruise   Lymphadenopathy:      Cervical: No cervical adenopathy  Neurological:      Mental Status: She is alert  Vitals:    06/01/22 1554   BP: 146/76   BP Location: Left arm   Patient Position: Sitting   Cuff Size: Adult   Pulse: 72   Resp: 20   Temp: 98 9 °F (37 2 °C)   TempSrc: Tympanic   SpO2: 97%   Weight: 59 7 kg (131 lb 9 6 oz)   Height: 5' 6" (1 676 m)       Transitional Care Management Review:  Chao Cardenas is a 78 y o  female here for TCM follow up  During the TCM phone call patient stated:    TCM Call (since 5/1/2022)     Date and time call was made  5/31/2022  3:08 PM    Patient was hospitialized at  [facility]    Date of Admission  05/25/22    Date of discharge  05/29/22    Diagnosis  Fall down stairs    Disposition  Home    Were the patients medications reviewed and updated  No    Current Symptoms  None      TCM Call (since 5/1/2022)     Post hospital issues  None    Should patient be enrolled in anticoag monitoring? No    Scheduled for follow up?   Yes    Did you obtain your prescribed medications  Yes    Do you need help managing your prescriptions or medications  No    Is transportation to your appointment needed  No    I have advised the patient to call PCP with any new or worsening symptoms  [address] members    Support System  Family    The type of support provided  Emotional; Physical    Do you have social support  Yes, some    Are you recieving any outpatient services  No    Are you recieving home care services  No    Are you using any community resources  No    Current waiver services  No    Have you fallen in the last 12 months  No    Interperter language line needed  No    Counseling  Patient          Nat Kwan MD

## 2022-06-01 NOTE — ASSESSMENT & PLAN NOTE
Advised pt to make an appt with orthopedics  Sent Home PT message  Continue dilaudid for pain prn  SE educated pt

## 2022-06-02 ENCOUNTER — HOME CARE VISIT (OUTPATIENT)
Dept: HOME HEALTH SERVICES | Facility: HOME HEALTHCARE | Age: 82
End: 2022-06-02

## 2022-06-02 ENCOUNTER — TELEPHONE (OUTPATIENT)
Dept: FAMILY MEDICINE CLINIC | Facility: CLINIC | Age: 82
End: 2022-06-02

## 2022-06-02 NOTE — TELEPHONE ENCOUNTER
Tried to call Celine Klein, busy  Please tell Celine Klein it is ok to call her son [phone] to set up appt  Pt lives very close to her son

## 2022-06-02 NOTE — TELEPHONE ENCOUNTER
Malaika Brown with [address] [place] [address]  ([phone]) called in regards to scheduling home care for patient  States instructions she received listed a contact named Chelsea Ortega, patient's foster daughter  States that phone # provided contained too many digits  No contact listed in patient's chart  Malaika Brown asks if provider could call or resend contact number for Chelsea Ortega or advise how to best reach patient

## 2022-06-03 ENCOUNTER — HOME CARE VISIT (OUTPATIENT)
Dept: HOME HEALTH SERVICES | Facility: HOME HEALTHCARE | Age: 82
End: 2022-06-03
Payer: MEDICARE

## 2022-06-03 VITALS
DIASTOLIC BLOOD PRESSURE: 64 MMHG | RESPIRATION RATE: 20 BRPM | BODY MASS INDEX: 21.05 KG/M2 | SYSTOLIC BLOOD PRESSURE: 110 MMHG | HEIGHT: 66 IN | HEART RATE: 88 BPM | OXYGEN SATURATION: 96 % | WEIGHT: 131 LBS

## 2022-06-03 PROCEDURE — 400013 VN SOC

## 2022-06-03 PROCEDURE — G0151 HHCP-SERV OF PT,EA 15 MIN: HCPCS

## 2022-06-03 PROCEDURE — 10330081 VN NO-PAY CLAIM PROCEDURE

## 2022-06-04 NOTE — CASE COMMUNICATION
[facility] has admitted your patient to [address] [place] [address] service with the following disciplines:      PT  and OT and MSW  This report is informational only, no responses is needed  Primary focus of home health care home safety  Eusebio Yang gait and balance activities Adls  Patient stated goals of care to return to her independence in home  Anticipated visit pattern and next visit date 1x1wk 2 x 3wk 1 x2wks or till goals achieved  See medication list  -   Significant clinical findings none  Potential barriers to goal achievement extremely cluttered home   lack of caregiver support  Other pertinent information Will be adding  MSW intervention for community resources and transportation alternatives    Thank you for allowing us to participate in the care of your patient        Sissy Larose PT

## 2022-06-04 NOTE — CASE COMMUNICATION
MSW referral for community resources  Eugene Calhoun alternative transportation services      Patient request staff call or text son Luis Altamirano with phone number so patient can call you   she doesnt answer the phone due to too many spam calls

## 2022-06-06 ENCOUNTER — HOME CARE VISIT (OUTPATIENT)
Dept: HOME HEALTH SERVICES | Facility: HOME HEALTHCARE | Age: 82
End: 2022-06-06
Payer: MEDICARE

## 2022-06-07 ENCOUNTER — HOME CARE VISIT (OUTPATIENT)
Dept: HOME HEALTH SERVICES | Facility: HOME HEALTHCARE | Age: 82
End: 2022-06-07
Payer: MEDICARE

## 2022-06-07 PROCEDURE — G0151 HHCP-SERV OF PT,EA 15 MIN: HCPCS

## 2022-06-08 ENCOUNTER — HOME CARE VISIT (OUTPATIENT)
Dept: HOME HEALTH SERVICES | Facility: HOME HEALTHCARE | Age: 82
End: 2022-06-08
Payer: MEDICARE

## 2022-06-08 PROCEDURE — G0155 HHCP-SVS OF CSW,EA 15 MIN: HCPCS

## 2022-06-09 ENCOUNTER — HOME CARE VISIT (OUTPATIENT)
Dept: HOME HEALTH SERVICES | Facility: HOME HEALTHCARE | Age: 82
End: 2022-06-09
Payer: MEDICARE

## 2022-06-09 VITALS — SYSTOLIC BLOOD PRESSURE: 111 MMHG | DIASTOLIC BLOOD PRESSURE: 52 MMHG | HEART RATE: 63 BPM | OXYGEN SATURATION: 98 %

## 2022-06-09 PROCEDURE — G0152 HHCP-SERV OF OT,EA 15 MIN: HCPCS

## 2022-06-09 NOTE — CASE COMMUNICATION
OT evaluation completed 6 9 22  OT requesting sign off of OT POC order of 1 x week 1 and 2 x a week for 2 weeks to address functional I in her home and progression of ortho protocol for R UE

## 2022-06-10 ENCOUNTER — HOME CARE VISIT (OUTPATIENT)
Dept: HOME HEALTH SERVICES | Facility: HOME HEALTHCARE | Age: 82
End: 2022-06-10
Payer: MEDICARE

## 2022-06-10 PROCEDURE — G0151 HHCP-SERV OF PT,EA 15 MIN: HCPCS

## 2022-06-12 PROBLEM — M81.0 SENILE OSTEOPOROSIS: Status: ACTIVE | Noted: 2022-06-12

## 2022-06-13 ENCOUNTER — HOME CARE VISIT (OUTPATIENT)
Dept: HOME HEALTH SERVICES | Facility: HOME HEALTHCARE | Age: 82
End: 2022-06-13
Payer: MEDICARE

## 2022-06-13 VITALS — DIASTOLIC BLOOD PRESSURE: 77 MMHG | OXYGEN SATURATION: 99 % | SYSTOLIC BLOOD PRESSURE: 144 MMHG | HEART RATE: 71 BPM

## 2022-06-13 PROCEDURE — G0152 HHCP-SERV OF OT,EA 15 MIN: HCPCS

## 2022-06-14 ENCOUNTER — HOME CARE VISIT (OUTPATIENT)
Dept: HOME HEALTH SERVICES | Facility: HOME HEALTHCARE | Age: 82
End: 2022-06-14
Payer: MEDICARE

## 2022-06-14 VITALS
SYSTOLIC BLOOD PRESSURE: 138 MMHG | DIASTOLIC BLOOD PRESSURE: 76 MMHG | RESPIRATION RATE: 20 BRPM | HEART RATE: 72 BPM | OXYGEN SATURATION: 97 %

## 2022-06-14 PROCEDURE — G0151 HHCP-SERV OF PT,EA 15 MIN: HCPCS

## 2022-06-15 ENCOUNTER — HOME CARE VISIT (OUTPATIENT)
Dept: HOME HEALTH SERVICES | Facility: HOME HEALTHCARE | Age: 82
End: 2022-06-15
Payer: MEDICARE

## 2022-06-15 VITALS — HEART RATE: 69 BPM | OXYGEN SATURATION: 99 % | SYSTOLIC BLOOD PRESSURE: 148 MMHG | DIASTOLIC BLOOD PRESSURE: 76 MMHG

## 2022-06-15 PROCEDURE — G0152 HHCP-SERV OF OT,EA 15 MIN: HCPCS

## 2022-06-16 ENCOUNTER — HOSPITAL ENCOUNTER (OUTPATIENT)
Dept: RADIOLOGY | Facility: HOSPITAL | Age: 82
Discharge: HOME | End: 2022-06-16
Attending: ORTHOPAEDIC SURGERY
Payer: MEDICARE

## 2022-06-16 ENCOUNTER — OFFICE VISIT (OUTPATIENT)
Dept: OBGYN CLINIC | Facility: HOSPITAL | Age: 82
End: 2022-06-16

## 2022-06-16 VITALS — BODY MASS INDEX: 20.34 KG/M2 | WEIGHT: 126 LBS

## 2022-06-16 DIAGNOSIS — S42.009A CLAVICLE FRACTURE: ICD-10-CM

## 2022-06-16 PROCEDURE — 73000 X-RAY EXAM OF COLLAR BONE: CPT

## 2022-06-16 PROCEDURE — 99024 POSTOP FOLLOW-UP VISIT: CPT | Performed by: ORTHOPAEDIC SURGERY

## 2022-06-16 NOTE — PROGRESS NOTES
ASSESSMENT/PLAN:    Assessment:   78 y o  female DOI 5/25/22 Right midshaft clavicle fracture, Right nondisplaced scapular fracture    Plan: Today I had a long discussion with the caregiver regarding the diagnosis and plan moving forward  Well healing right clavicle and scapula fracture  Ok to wean out of sling as tolerated  She is receiving home PT and she may being doing some gentle shoulder ROM exercises  Follow up: 6 weeks with XRay    The above diagnosis and plan has been dicussed with the patient and caregiver  They verbalized an understanding and will follow up accordingly  _____________________________________________________  CHIEF COMPLAINT:  No chief complaint on file  SUBJECTIVE:  Carlos Joseph is a 78 y o  female who presents today for follow up of her right clavicle and scapula fracture  She was seen in the ED S/P fall on 5/25/22 and Ortho was consulted for the above injuries  She has been in her arm sling as directed and is doing well  Most of her discomfort is localized to the shoulder blade vs the collar bone  She is receiving home PT    Pain is improved by rest   Radiation of pain Negative  Numbness/tingling Negative    PAST MEDICAL HISTORY:  Past Medical History:   Diagnosis Date    CTS (carpal tunnel syndrome)     unspecified laterality    DVT (deep venous thrombosis) ([facility])     left leg, s/p IVC filter 11/2015    Endometriosis     Fibromyalgia     Fibromyalgia, primary     Foot drop, left foot     Hypercholesteremia     Hypertension     Hypothyroidism     Lyme disease     treated 8/2015    Migraine     Migraine     Neurogenic claudication due to lumbar spinal stenosis     Osteoarthritis     PAF (paroxysmal atrial fibrillation) ([facility])     s/p [facility] (sees Dr Mel Leggett at [facility])   [place] Drop Peripheral neuropathy     Raynaud's syndrome without gangrene     Sjogren's syndrome ([facility] [address] )     Solitary pulmonary nodule on lung CT        PAST SURGICAL HISTORY:  Past Surgical History:   Procedure Laterality Date    ABDOMINAL HYSTERECTOMY      APPENDECTOMY      ATRIAL ABLATION SURGERY      cath    BREAST SURGERY Left     puncture aspiration of cyst onset 1972    CHOLECYSTECTOMY      onset 1981    COLONOSCOPY      fiberoptic    FOOT SURGERY Bilateral     onset 1993- removal of mortons neuroma    HAND SURGERY      gaglion cyst removal    HAND SURGERY      onset 1991 - carpal tunnel    IVC FILTER INSERTION      KNEE ARTHROSCOPY Left     therapeutic     LYMPH NODE BIOPSY      1996 onset    VA ARTHRODESIS POSTERIOR/POSTEROLATERAL LUMBAR N/A 3/29/2016    Procedure: L2-S1 LAMINECTOMY;  Surgeon: Andreina Elaine DO;  [facility]: [facility];  Service: Orthopedics    TOTAL ABDOMINAL HYSTERECTOMY      onset 1989       FAMILY HISTORY:  Family History   Problem Relation Age of Onset    Heart disease Father     ADD / ADHD Father     Stroke Father     Cancer Brother         prostate    Heart attack Maternal Grandmother         acute MI    Cancer Family        SOCIAL HISTORY:  Social History     Tobacco Use    Smoking status: Never Smoker    Smokeless tobacco: Never Used   Substance Use Topics    Alcohol use: Yes     Comment: per pt occassional GLASS OF WINE    Drug use: No       MEDICATIONS:    Current Outpatient Medications:     aspirin 325 mg tablet, Take 1 tablet (325 mg total) by mouth daily, Disp: 30 tablet, Rfl: 0    chlorhexidine (PERIDEX) 0 12 % solution, RINSE MOUTH WITH 15 ML (1 CAPFUL) FOR 30 SECONDS IN MORNING AND E   (REFER TO PRESCRIPTION NOTES)  (Patient not taking: Reported on 6/6/2022), Disp: , Rfl: 0    Cholecalciferol (VITAMIN D3) 5000 units CAPS, Take 1,000 Units by mouth daily , Disp: , Rfl:     DULoxetine (CYMBALTA) 60 mg delayed release capsule, Take 1 capsule (60 mg total) by mouth daily, Disp: 90 capsule, Rfl: 1    ezetimibe (ZETIA) 10 mg tablet, Take 10 mg by mouth daily  , Disp: , Rfl:     levothyroxine 88 mcg tablet, TAKE 1 TABLET BY MOUTH  DAILY, Disp: 90 tablet, Rfl: 3    lisinopril (ZESTRIL) 5 mg tablet, Take 5 mg by mouth 2 (two) times a day, Disp: , Rfl: 0    Menthol 5 4 MG LOZG, Take 1 lozenge every 2 hours as needed  , Disp: , Rfl: 0    Multiple Vitamin (multivitamin) tablet, Take 1 tablet by mouth daily, Disp: , Rfl:     topiramate (TOPAMAX) 50 MG tablet, TAKE 1 TABLET BY MOUTH  DAILY, Disp: 90 tablet, Rfl: 3    ALLERGIES:  Allergies   Allergen Reactions    Betaine     Cranberry Extract - Food Allergy     Cranberry Juice Powder - Food Allergy     Latex      Other reaction(s): Rash and itching    Soy Isoflavones      Other reaction(s): Itching    Soybean-Containing Drug Products - Food Allergy     Voltaren [Diclofenac Sodium]        REVIEW OF SYSTEMS:  ROS is negative other than that noted in the HPI  Constitutional: Negative for fatigue and fever  HENT: Negative for sore throat  Respiratory: Negative for shortness of breath  Cardiovascular: Negative for chest pain  Gastrointestinal: Negative for abdominal pain  Endocrine: Negative for cold intolerance and heat intolerance  Genitourinary: Negative for flank pain  Musculoskeletal: Negative for back pain  Skin: Negative for rash  Allergic/Immunologic: Negative for immunocompromised state  Neurological: Negative for dizziness  Psychiatric/Behavioral: Negative for agitation  _____________________________________________________  PHYSICAL EXAMINATION:  There were no vitals filed for this visit  General/Constitutional: NAD, well developed, well nourished  HENT: Normocephalic, atraumatic  CV: Intact distal pulses, regular rate  Resp: No respiratory distress or labored breathing  Abd: Soft and NT  Lymphatic: No lymphadenopathy palpated  Neuro: Alert,no focal deficits  Psych: Normal mood  Skin: Warm, dry, no rashes, no erythema      MUSCULOSKELETAL EXAMINATION:    Right Shoulder:   No pain over the right clavicle fracture    Skin intact with palpable bony stepoff that is nontender  UE NV Exam: +2 Radial pulses bilaterally  Sensation intact to light touch C5-T1 bilaterally, Radial/median/ulnar nerve motor intact      Bilateral elbow, wrist, and and forearm ROM full, painless with passive ROM, no ttp or crepitance throughout extremities below shoulder joint    Cervical ROM is full without pain, numbness or tingling        _____________________________________________________  STUDIES REVIEWED:  Imaging studies reviewed by Dr Janie Short and demonstrate well healing minimally displaced midshaft fracture of the right clavicle         PROCEDURES PERFORMED:    No Procedures performed today

## 2022-06-17 ENCOUNTER — HOME CARE VISIT (OUTPATIENT)
Dept: HOME HEALTH SERVICES | Facility: HOME HEALTHCARE | Age: 82
End: 2022-06-17
Payer: MEDICARE

## 2022-06-17 PROCEDURE — G0151 HHCP-SERV OF PT,EA 15 MIN: HCPCS

## 2022-06-20 ENCOUNTER — HOME CARE VISIT (OUTPATIENT)
Dept: HOME HEALTH SERVICES | Facility: HOME HEALTHCARE | Age: 82
End: 2022-06-20
Payer: MEDICARE

## 2022-06-20 ENCOUNTER — TELEPHONE (OUTPATIENT)
Dept: OBGYN CLINIC | Facility: HOSPITAL | Age: 82
End: 2022-06-20

## 2022-06-20 VITALS — OXYGEN SATURATION: 97 % | SYSTOLIC BLOOD PRESSURE: 103 MMHG | HEART RATE: 63 BPM | DIASTOLIC BLOOD PRESSURE: 57 MMHG

## 2022-06-20 PROCEDURE — G0152 HHCP-SERV OF OT,EA 15 MIN: HCPCS

## 2022-06-20 NOTE — Clinical Note
Hi Dr Janae Garduno,     Thank you for confirming no ROM limitations  Is the pt able to weight bear as tolerated through the R UE now or continue non-weight bearing? Do you recommend continuing with the sling? If so, when can she doff it? Thank you,  Adriel Martin OT with [facility]      ----- Message -----  From: Milli Pimentel DO  Sent: 6/21/2022   7:59 AM EDT  To: Trino Barnes, no range-of-motion limitations   ----- Message -----  From: Sinda Alert  Sent: 6/20/2022   1:06 PM EDT  To: DO Jorge Beltre Dr, I wanted to confirm any recommendations for increased weight bearing, sling wearing schedule prn, any ROM limitations at this time for progression with her R UE home therapy

## 2022-06-20 NOTE — TELEPHONE ENCOUNTER
Dr Carol Farrell from [facility] is calling to obtain any protocols for her right shoulder  Please advise  # [phone]    Please place order into Epic

## 2022-06-21 ENCOUNTER — HOME CARE VISIT (OUTPATIENT)
Dept: HOME HEALTH SERVICES | Facility: HOME HEALTHCARE | Age: 82
End: 2022-06-21
Payer: MEDICARE

## 2022-06-21 VITALS — DIASTOLIC BLOOD PRESSURE: 76 MMHG | HEART RATE: 60 BPM | SYSTOLIC BLOOD PRESSURE: 138 MMHG | OXYGEN SATURATION: 98 %

## 2022-06-21 PROCEDURE — G0151 HHCP-SERV OF PT,EA 15 MIN: HCPCS

## 2022-06-22 ENCOUNTER — HOME CARE VISIT (OUTPATIENT)
Dept: HOME HEALTH SERVICES | Facility: HOME HEALTHCARE | Age: 82
End: 2022-06-22
Payer: MEDICARE

## 2022-06-22 VITALS — DIASTOLIC BLOOD PRESSURE: 82 MMHG | HEART RATE: 60 BPM | SYSTOLIC BLOOD PRESSURE: 158 MMHG | OXYGEN SATURATION: 98 %

## 2022-06-22 PROCEDURE — G0152 HHCP-SERV OF OT,EA 15 MIN: HCPCS

## 2022-06-22 NOTE — CASE COMMUNICATION
Pt discharged from home OT this date secondary to meeting max potential  OT recommending CGA for bathing secondary to requiring installation of gb in shower and nonskid strips to shower floor  Otherwise would be cs  Pt continues with environmental hazards and would benefit from HHA in her home to assist with bathing, and IADLs like cleaning, cooking, laundry  Pt reporting she plans to reach out to [facility] [place] for phone numbers for agencies, radha t despite the cost, she realizes it is necessary  Pt also reporting fall earlier this date in the kitchen near the sink from unknown cause other than losing her balance while keeping her cane across the room  Pt noted with soreness noted in R ribs after fall, as she feel on her R side, but dissipating by afternoon OT session  Pt denies hit to head or LOC  OT recommended medical evaluation   Pt refusing at this time, reporting, "If i  feel like I need it I will get checked "

## 2022-06-22 NOTE — TELEPHONE ENCOUNTER
Victoriano Tidwell is calling to find if patient is allowed to weight bear as tolerated or non weightbearing   Also does she need to be in or out of sling      She goes to see patient at 2:30 today    C/b # [phone]

## 2022-06-24 ENCOUNTER — HOME CARE VISIT (OUTPATIENT)
Dept: HOME HEALTH SERVICES | Facility: HOME HEALTHCARE | Age: 82
End: 2022-06-24
Payer: MEDICARE

## 2022-06-24 PROCEDURE — G0151 HHCP-SERV OF PT,EA 15 MIN: HCPCS

## 2022-06-28 ENCOUNTER — HOME CARE VISIT (OUTPATIENT)
Dept: HOME HEALTH SERVICES | Facility: HOME HEALTHCARE | Age: 82
End: 2022-06-28
Payer: MEDICARE

## 2022-06-28 PROCEDURE — G0151 HHCP-SERV OF PT,EA 15 MIN: HCPCS

## 2022-07-01 ENCOUNTER — HOME CARE VISIT (OUTPATIENT)
Dept: HOME HEALTH SERVICES | Facility: HOME HEALTHCARE | Age: 82
End: 2022-07-01
Payer: MEDICARE

## 2022-07-01 PROCEDURE — G0151 HHCP-SERV OF PT,EA 15 MIN: HCPCS

## 2022-07-01 NOTE — CASE COMMUNICATION
TC to the patient for med review  No answer and unable to leave message  Please remind the patient to call in to [phone] for med review at next therapy session  frenting

## 2022-07-02 NOTE — CASE COMMUNICATION
Patient dc from [address] [place] [address] Rk Guerrero PT intervention on this date  Patient has achieved her prior level of function  She demonstrates safe mobility within the environmental barriers that her home allows with use of QC   she is indep with her adls as her right shoulder strength and rom improved

## 2022-07-12 ENCOUNTER — OFFICE VISIT (OUTPATIENT)
Dept: OBGYN CLINIC | Facility: HOSPITAL | Age: 82
End: 2022-07-12

## 2022-07-12 ENCOUNTER — HOSPITAL ENCOUNTER (OUTPATIENT)
Dept: RADIOLOGY | Facility: HOSPITAL | Age: 82
Discharge: HOME | End: 2022-07-12
Attending: ORTHOPAEDIC SURGERY
Payer: MEDICARE

## 2022-07-12 ENCOUNTER — HOSPITAL ENCOUNTER (OUTPATIENT)
Dept: RADIOLOGY | Facility: HOSPITAL | Age: 82
Discharge: HOME | End: 2022-07-12
Payer: MEDICARE

## 2022-07-12 VITALS — BODY MASS INDEX: 20.34 KG/M2 | WEIGHT: 126 LBS

## 2022-07-12 DIAGNOSIS — S42.001D CLOSED DISPLACED FRACTURE OF RIGHT CLAVICLE WITH ROUTINE HEALING, UNSPECIFIED PART OF CLAVICLE, SUBSEQUENT ENCOUNTER: Primary | ICD-10-CM

## 2022-07-12 DIAGNOSIS — R52 PAIN: ICD-10-CM

## 2022-07-12 PROCEDURE — 73000 X-RAY EXAM OF COLLAR BONE: CPT

## 2022-07-12 PROCEDURE — 99024 POSTOP FOLLOW-UP VISIT: CPT | Performed by: ORTHOPAEDIC SURGERY

## 2022-07-12 NOTE — PROGRESS NOTES
ASSESSMENT/PLAN:    Assessment:   78 y o  female  DOI 5/25/22 Right midshaft clavicle fracture, Right nondisplaced scapular fracture    Plan: Today I had a long discussion with the caregiver regarding the diagnosis and plan moving forward  Clarissa's fractures have both healed nicely  She has no limitation outside of some discomfort with overhead activities  She will return to her activities as tolerated  I did offer her some physical therapy for her right trapezius muscle and shoulder but she wishes to continue with home stretches and will let us know if she has any difficulties  She will otherwise follow up on as-needed basis      The above diagnosis and plan has been dicussed with the patient and caregiver  They verbalized an understanding and will follow up accordingly  _____________________________________________________    SUBJECTIVE:  Esha Pierce is a 78 y o  female who presents for follow-up of her right clavicle and scapular fractures  She is now 7 weeks out from injury date  She continues to improve  She is able to do her hair with minimal pain  She does have some discomfort in the neck extending down the arm but this is intermittent    She denies numbness or tingling    PAST MEDICAL HISTORY:  Past Medical History:   Diagnosis Date    CTS (carpal tunnel syndrome)     unspecified laterality    DVT [facility]deep venous thrombosis) ([facility])     left leg, s/p IVC filter 11/2015    Endometriosis     Fibromyalgia     Fibromyalgia, primary     Foot drop, left foot     Hypercholesteremia     Hypertension     Hypothyroidism     Lyme disease     treated 8/2015    Migraine     Migraine     Neurogenic claudication due to lumbar spinal stenosis     Osteoarthritis     PAF (paroxysmal atrial fibrillation) ([facility])     s/p ablation at [facility] (sees Dr Maikel Flores at [facility])   Aida Reardon Peripheral neuropathy     Raynaud's syndrome without gangrene     Sjogren's syndrome ([facility] [address]     Solitary pulmonary nodule on lung CT        PAST SURGICAL HISTORY:  Past Surgical History:   Procedure Laterality Date    ABDOMINAL HYSTERECTOMY      APPENDECTOMY      ATRIAL ABLATION SURGERY      cath    BREAST SURGERY Left     puncture aspiration of cyst onset 1972    CHOLECYSTECTOMY      onset 1981    COLONOSCOPY      fiberoptic    FOOT SURGERY Bilateral     onset 1993- removal of mortons neuroma    HAND SURGERY      gaglion cyst removal    HAND SURGERY      onset 1991 - carpal tunnel    IVC FILTER INSERTION      KNEE ARTHROSCOPY Left     therapeutic     LYMPH NODE BIOPSY      1996 onset    [place] ARTHRODESIS POSTERIOR/POSTEROLATERAL LUMBAR N/A 3/29/2016    Procedure: L2-S1 LAMINECTOMY;  Surgeon: Ariela Mcguire DO;  [facility];  Service: Orthopedics    TOTAL ABDOMINAL HYSTERECTOMY      onset 1989       FAMILY HISTORY:  Family History   Problem Relation Age of Onset    Heart disease Father     ADD / ADHD Father     Stroke Father     Cancer Brother         prostate    Heart attack Maternal Grandmother         acute MI    Cancer Family        SOCIAL HISTORY:  Social History     Tobacco Use    Smoking status: Never Smoker    Smokeless tobacco: Never Used   Substance Use Topics    Alcohol use: Yes     Comment: per pt occassional GLASS OF WINE    Drug use: No       MEDICATIONS:    Current Outpatient Medications:     aspirin 325 mg tablet, Take 1 tablet (325 mg total) by mouth daily, Disp: 30 tablet, Rfl: 0    chlorhexidine (PERIDEX) 0 12 % solution, RINSE MOUTH WITH 15 ML (1 CAPFUL) FOR 30 SECONDS IN MORNING AND E   (REFER TO PRESCRIPTION NOTES)  (Patient not taking: Reported on 6/6/2022), Disp: , Rfl: 0    Cholecalciferol (VITAMIN D3) 5000 units CAPS, Take 1,000 Units by mouth daily , Disp: , Rfl:     DULoxetine (CYMBALTA) 60 mg delayed release capsule, Take 1 capsule (60 mg total) by mouth daily, Disp: 90 capsule, Rfl: 1    ezetimibe (ZETIA) 10 mg tablet, Take 10 mg by mouth daily  , Disp: , Rfl:    levothyroxine 88 mcg tablet, TAKE 1 TABLET BY MOUTH  DAILY, Disp: 90 tablet, Rfl: 3    lisinopril (ZESTRIL) 5 mg tablet, Take 5 mg by mouth 2 (two) times a day, Disp: , Rfl: 0    Menthol 5 4 MG LOZG, Take 1 lozenge every 2 hours as needed  , Disp: , Rfl: 0    Multiple Vitamin (multivitamin) tablet, Take 1 tablet by mouth daily, Disp: , Rfl:     topiramate (TOPAMAX) 50 MG tablet, TAKE 1 TABLET BY MOUTH  DAILY, Disp: 90 tablet, Rfl: 3    ALLERGIES:  Allergies   Allergen Reactions    Betaine     Cranberry Extract - Food Allergy     Cranberry Juice Powder - Food Allergy     Latex      Other reaction(s): Rash and itching    Soy Isoflavones      Other reaction(s): Itching    Soybean-Containing Drug Products - Food Allergy     Voltaren [Diclofenac Sodium]        REVIEW OF SYSTEMS:  ROS is negative other than that noted in the HPI  Constitutional: Negative for fatigue and fever  HENT: Negative for sore throat  Respiratory: Negative for shortness of breath  Cardiovascular: Negative for chest pain  Gastrointestinal: Negative for abdominal pain  Endocrine: Negative for cold intolerance and heat intolerance  Genitourinary: Negative for flank pain  Musculoskeletal: Negative for back pain  Skin: Negative for rash  Allergic/Immunologic: Negative for immunocompromised state  Neurological: Negative for dizziness  Psychiatric/Behavioral: Negative for agitation  _____________________________________________________  PHYSICAL EXAMINATION:  General/Constitutional: NAD, well developed, well nourished  HENT: Normocephalic, atraumatic  CV: Intact distal pulses, regular rate  Resp: No respiratory distress or labored breathing  Lymphatic: No lymphadenopathy palpated  Neuro: Alert and  awake  Psych: Normal mood  Skin: Warm, dry, no rashes, no erythema      MUSCULOSKELETAL EXAMINATION:  Right shoulder active motion is well maintained    She has palpable bony protuberance over the healed midshaft clavicle region without tenderness    _____________________________________________________  STUDIES REVIEWED:  Imaging studies reviewed by Dr Fang Sebastian and demonstrate Excellent callus formation surrounding a healed midshaft clavicle fracture      PROCEDURES PERFORMED:    No Procedures performed today

## 2022-09-19 ENCOUNTER — OFFICE VISIT (OUTPATIENT)
Dept: DENTISTRY | Facility: CLINIC | Age: 82
End: 2022-09-19

## 2022-09-19 VITALS — HEART RATE: 64 BPM | TEMPERATURE: 97.7 F | SYSTOLIC BLOOD PRESSURE: 137 MMHG | DIASTOLIC BLOOD PRESSURE: 67 MMHG

## 2022-09-19 DIAGNOSIS — K02.9 DENTAL DECAY: Primary | ICD-10-CM

## 2022-09-19 PROCEDURE — D0140 LIMITED ORAL EVALUATION - PROBLEM FOCUSED: HCPCS | Performed by: DENTIST

## 2022-09-19 PROCEDURE — D0220 INTRAORAL - PERIAPICAL FIRST RADIOGRAPHIC IMAGE: HCPCS | Performed by: DENTIST

## 2022-09-19 NOTE — PROGRESS NOTES
Limited Oral Evaluation    Clarissarafal Paulson presented to [facility] dental clinic for a limited oral evaluation for "a hole in my tooth on the UL, I think I have a cavity " PMH reviewed, no changes  ASA: 3  Pain: 0/10 at the moment, but UL can be an annoyance sometimes    PA radiograph taken of #13/14 area  No PAP noted  No radiographic caries, but both teeth have patches of composite and amalgam restorations  #13 and #14 were both palpation and percussion negative  Clinical exam revealed decay on mesial of #13  No swelling or fistula noted  Both teeth were worn down and advised pt that crowns may be needed for these teeth  Pt had two implants placed in 2018/2019, but were never uncovered or restored  Pt is unsure if she has paid fully for them  Explained to pt we will complete a restoration on #13 MO  Explained there is always a risk for RCT treatment  Explained to pt since she hasn't been to the clinic in a while, we will take diagnostic impression and complete an exam after decay is removed  Pt understood  Pt left ambulatory and satisfied       NV: #13 MO restoration  NV2: diagnostic models/comp exam

## 2022-10-05 ENCOUNTER — TELEPHONE (OUTPATIENT)
Dept: NEUROLOGY | Facility: CLINIC | Age: 82
End: 2022-10-05

## 2022-10-05 NOTE — TELEPHONE ENCOUNTER
Attempted to call pt for reminder of appt with Justin Huizar on 10/11/22, no answer  Unable to leave a vm inbox was full

## 2022-10-11 ENCOUNTER — OFFICE VISIT (OUTPATIENT)
Dept: FAMILY MEDICINE CLINIC | Facility: CLINIC | Age: 82
End: 2022-10-11
Payer: MEDICARE

## 2022-10-11 VITALS
BODY MASS INDEX: 22.61 KG/M2 | RESPIRATION RATE: 20 BRPM | DIASTOLIC BLOOD PRESSURE: 58 MMHG | OXYGEN SATURATION: 98 % | SYSTOLIC BLOOD PRESSURE: 110 MMHG | HEART RATE: 64 BPM | TEMPERATURE: 97.7 F | HEIGHT: 64 IN | WEIGHT: 132.4 LBS

## 2022-10-11 DIAGNOSIS — R73.01 IMPAIRED FASTING GLUCOSE: ICD-10-CM

## 2022-10-11 DIAGNOSIS — E78.5 HYPERLIPIDEMIA, UNSPECIFIED HYPERLIPIDEMIA TYPE: ICD-10-CM

## 2022-10-11 DIAGNOSIS — Z23 ENCOUNTER FOR IMMUNIZATION: ICD-10-CM

## 2022-10-11 DIAGNOSIS — M79.7 FIBROMYALGIA: ICD-10-CM

## 2022-10-11 DIAGNOSIS — M81.0 SENILE OSTEOPOROSIS: ICD-10-CM

## 2022-10-11 DIAGNOSIS — Z00.00 MEDICARE ANNUAL WELLNESS VISIT, SUBSEQUENT: ICD-10-CM

## 2022-10-11 DIAGNOSIS — G43.009 MIGRAINE WITHOUT AURA AND WITHOUT STATUS MIGRAINOSUS, NOT INTRACTABLE: ICD-10-CM

## 2022-10-11 DIAGNOSIS — I10 PRIMARY HYPERTENSION: Chronic | ICD-10-CM

## 2022-10-11 DIAGNOSIS — E03.9 HYPOTHYROIDISM, UNSPECIFIED TYPE: Primary | ICD-10-CM

## 2022-10-11 PROBLEM — G89.11 ACUTE PAIN DUE TO TRAUMA: Status: RESOLVED | Noted: 2022-05-26 | Resolved: 2022-10-11

## 2022-10-11 PROCEDURE — 99214 OFFICE O/P EST MOD 30 MIN: CPT | Performed by: FAMILY MEDICINE

## 2022-10-11 PROCEDURE — G0439 PPPS, SUBSEQ VISIT: HCPCS | Performed by: FAMILY MEDICINE

## 2022-10-11 PROCEDURE — G0008 ADMIN INFLUENZA VIRUS VAC: HCPCS

## 2022-10-11 PROCEDURE — 90662 IIV NO PRSV INCREASED AG IM: CPT

## 2022-10-11 RX ORDER — LEVOTHYROXINE SODIUM 88 UG/1
88 TABLET ORAL DAILY
Qty: 90 TABLET | Refills: 3 | Status: SHIPPED | OUTPATIENT
Start: 2022-10-11

## 2022-10-11 NOTE — PROGRESS NOTES
Chief Complaint   Patient presents with   • Medicare Wellness Visit     Subsequent  • Follow-up     6 months  Health Maintenance   Topic Date Due   • COVID-19 Vaccine (3 - Booster for Pfizer series) 08/17/2021   • Medicare Annual Wellness Visit (AWV)  09/02/2022   • Fall Risk  10/11/2023   • Urinary Incontinence Screening  10/11/2023   • BMI: Adult  10/11/2023   • Osteoporosis Screening  Completed   • Pneumococcal Vaccine: 65+ Years  Completed   • Influenza Vaccine  Completed   • HIB Vaccine  Aged Out   • Hepatitis B Vaccine  Aged Out   • IPV Vaccine  Aged Out   • Hepatitis A Vaccine  Aged Out   • Meningococcal ACWY Vaccine  Aged Out   • HPV Vaccine  Aged Out      Assessment and Plan:     Problem List Items Addressed This Visit        Endocrine    Hypothyroidism - Primary     Check TSH  Continue levothyroxine 88mcg daily  Relevant Medications    levothyroxine 88 mcg tablet    Impaired fasting glucose     Low carb diet  Cardiovascular and Mediastinum    Hypertension (Chronic)     DASH diet  Continue lisinopril 5mg QD per cardiology  Migraine     Continue topamax per neurology  Musculoskeletal and Integument    Senile osteoporosis     [facility] rheumatology  Advised pt to do labs  Other    Fibromyalgia     Continue duloxetine 60mg Qd per rheumatology  Hyperlipidemia     Continue zetia  Other Visit Diagnoses     Encounter for immunization        Relevant Orders    influenza vaccine, high-dose, PF 0 7 mL (FLUZONE HIGH-DOSE) (Completed)    Medicare annual wellness visit, subsequent              Falls Plan of Care: balance, strength, and gait training instructions were provided  Urinary Incontinence Plan of Care: counseling topics discussed: practice Kegel (pelvic floor strengthening) exercises  Give flu shot today  Got Covid19 shots  Did not get booster yet  Got Prevnar 10/2015   Got Pneumovax 2017     Will check pharmacy for shingrix  Fall precautions  Pt refused PT  Dexa 5/2022 T -3 6  [facility] rheumatology  RTO in 6 months with labs        Preventive health issues were discussed with patient, and age appropriate screening tests were ordered as noted in patient's After Visit Summary  Personalized health advice and appropriate referrals for health education or preventive services given if needed, as noted in patient's After Visit Summary  History of Present Illness:     Patient presents for a Medicare Wellness Visit    HPI     Pt is here with her daughter        Hypothyroidism---She is on levothyroxine 88 mcg daily       IFG---Tried to eat healthy       HTN---She is on lisinopril 5mg bid per cardiology  Pt denies headache, vision change, SOB or CP  [facility] cardiology Dr Keaton Salas for Afib s/p ablation 2006 yearly  Denies chest pain, palpitation etc  She is on ASA 81mg daily       Hyperlipidemia---She refused to take statin because of fibromyalgia  She is on zetia 10mg daily per cardiology  Follows low fat diet       Fibromyalgia/lyme disease----Feels tired always  Always shoulders/back pain  [facility] rheumatology Dr Umang Flowers for fibromyalgia/lyme disease  She is on duloxetine 60mg pm       [facility] neurology for headache/neuropathy  She is on topamax 50mg daily which helped a lot        [facility] orthopedics for lumbar radiculopathy and lumbar spinal stenosis s/p L2-S2 laminectomy in 2016  Has balance problems  She uses cane/walker        passed away in 7/2021  Son Dimitri Felder lives close to her     Does all ADL's  Does not drive any more  Myles Mann frequently  Denies depression  Patient Care Team:  Karel Zambrano MD as PCP - Larrie Mortimer, MD Carlyon Graves, MD Joanne Milks, MD Harl Amsler, MD (Inactive)  Venetta Maw, MD Maile Babinski, DO Armenta Quale Ward Kell, MD     Review of Systems:     Review of Systems   Constitutional: Negative for appetite change, chills and fever     HENT: Negative for congestion, ear pain, sinus pain and sore throat  Eyes: Negative for discharge and itching  Respiratory: Negative for apnea, cough, chest tightness, shortness of breath and wheezing  Cardiovascular: Negative for chest pain, palpitations and leg swelling  Gastrointestinal: Negative for abdominal pain, anal bleeding, constipation, diarrhea, nausea and vomiting  Endocrine: Negative for cold intolerance, heat intolerance and polyuria  Genitourinary: Negative for difficulty urinating and dysuria  Musculoskeletal: Positive for gait problem  Negative for arthralgias, back pain and myalgias  Skin: Negative for rash  Neurological: Negative for dizziness and headaches  Psychiatric/Behavioral: Negative for agitation          Problem List:     Patient Active Problem List   Diagnosis   • Lumbar stenosis with neurogenic claudication   • Hypertension   • Fibromyalgia   • Hyperlipidemia   • Migraine   • S/P Lumbar laminectomy and decompressio L2-S1   • Neuropathy   • Lumbar radiculopathy   • Abnormal chest x-ray   • Back pain   • Chronic osteomyelitis of left foot ([facility])   • Depression with anxiety   • Esophageal reflux   • Generalized pain   • Headache   • History of DVT (deep vein thrombosis)   • Hypothyroidism   • Impaired fasting glucose   • Insomnia   • Knee osteoarthritis   • Lung mass   • Lyme disease   • Memory loss   • PAF (paroxysmal atrial fibrillation) ([facility])   • Peripheral neuropathy, hereditary/idiopathic   • Pleural nodules   • Solitary fibrous tumor   • Statin intolerance   • Tinnitus   • Varicose veins   • Left foot drop   • Chronic pain syndrome   • Sjogren's syndrome without extraglandular involvement ([facility])   • Raynaud's disease without gangrene   • Spinal stenosis of lumbosacral region   • Closed fracture of right clavicle   • Cervical spine fracture ([facility])   • Fall down stairs   • Closed fracture of one rib of right side   • Subclavian artery injury   • Senile osteoporosis      Past Medical and Surgical History:     Past Medical History:   Diagnosis Date   • CTS (carpal tunnel syndrome)     unspecified laterality   • DVT (deep venous thrombosis) ([facility])     left leg, s/p IVC filter 11/2015   • Endometriosis    • Fibromyalgia    • Fibromyalgia, primary    • Foot drop, left foot    • Hypercholesteremia    • Hypertension    • Hypothyroidism    • Lyme disease     treated 8/2015   • Migraine    • Migraine    • Neurogenic claudication due to lumbar spinal stenosis    • Osteoarthritis    • PAF (paroxysmal atrial fibrillation) ([facility] [address]     s/p ablation at [facility] (sees Dr Cleveland Kussmaul at [facility])   • Peripheral neuropathy    • Raynaud's syndrome without gangrene    • Sjogren's syndrome ([facility] [address]    • Solitary pulmonary nodule on lung CT      Past Surgical History:   Procedure Laterality Date   • ABDOMINAL HYSTERECTOMY     • APPENDECTOMY     • ATRIAL ABLATION SURGERY      cath   • BREAST SURGERY Left     puncture aspiration of cyst onset 1972   • CHOLECYSTECTOMY      onset 1981   • COLONOSCOPY      fiberoptic   • FOOT SURGERY Bilateral     onset 1993- removal of mortons neuroma   • HAND SURGERY      gaglion cyst removal   • HAND SURGERY      onset 1991 - carpal tunnel   • IVC FILTER INSERTION     • KNEE ARTHROSCOPY Left     therapeutic    • LYMPH NODE BIOPSY      1996 onset   • [place] ARTHRODESIS POSTERIOR/POSTEROLATERAL LUMBAR N/A 3/29/2016    Procedure: L2-S1 LAMINECTOMY;  Surgeon: Finn Hill DO;  [facility];  Service: Orthopedics   • TOTAL ABDOMINAL HYSTERECTOMY      onset 1989      Family History:     Family History   Problem Relation Age of Onset   • Heart disease Father    • ADD / ADHD Father    • Stroke Father    • Cancer Brother         prostate   • Heart attack Maternal Grandmother         acute MI   • Cancer Family       Social History:     Social History     Socioeconomic History   • Marital status: /Civil Union     Spouse name: None   • Number of children: None   • Years of education: None   • Highest education level: None Occupational History   • None   Tobacco Use   • Smoking status: Never Smoker   • Smokeless tobacco: Never Used   Substance and Sexual Activity   • Alcohol use: Yes     Comment: per pt occassional GLASS OF WINE   • Drug use: No   • Sexual activity: None   Other Topics Concern   • None   Social History Narrative   • None     Social Determinants of Health     Financial Resource Strain: Not on file   Food Insecurity: Not on file   Transportation Needs: Not on file   Physical Activity: Not on file   Stress: Not on file   Social Connections: Not on file   Intimate Partner Violence: Not on file   Housing Stability: Not on file      Medications and Allergies:     Current Outpatient Medications   Medication Sig Dispense Refill   • levothyroxine 88 mcg tablet Take 1 tablet (88 mcg total) by mouth daily 90 tablet 3   • chlorhexidine (PERIDEX) 0 12 % solution RINSE MOUTH WITH 15 ML (1 CAPFUL) FOR 30 SECONDS IN MORNING AND E   (REFER TO PRESCRIPTION NOTES)  (Patient not taking: Reported on 6/6/2022)  0   • Cholecalciferol (VITAMIN D3) 5000 units CAPS Take 1,000 Units by mouth daily      • DULoxetine (CYMBALTA) 60 mg delayed release capsule TAKE 1 CAPSULE BY MOUTH  DAILY 90 capsule 3   • ezetimibe (ZETIA) 10 mg tablet Take 10 mg by mouth daily  • lisinopril (ZESTRIL) 5 mg tablet Take 5 mg by mouth 2 (two) times a day  0   • Menthol 5 4 MG LOZG Take 1 lozenge every 2 hours as needed  0   • Multiple Vitamin (multivitamin) tablet Take 1 tablet by mouth daily     • topiramate (TOPAMAX) 50 MG tablet TAKE 1 TABLET BY MOUTH  DAILY 90 tablet 3     No current facility-administered medications for this visit       Allergies   Allergen Reactions   • Betaine    • Cranberry Extract - Food Allergy    • Cranberry Juice Powder - Food Allergy    • Latex      Other reaction(s): Rash and itching   • Soy Isoflavones      Other reaction(s): Itching   • Soybean-Containing Drug Products - Food Allergy    • Voltaren [Diclofenac Sodium] Immunizations:     Immunization History   Administered Date(s) Administered   • COVID-19 PFIZER VACCINE 0 3 ML IM 02/23/2021, 03/17/2021   • Influenza Split High Dose Preservative Free IM 10/14/2015, 10/21/2016, 10/30/2017   • Influenza, high dose seasonal 0 7 mL 11/06/2018, 01/18/2021, 09/02/2021, 10/11/2022   • Influenza, seasonal, injectable 11/20/2012   • Pneumococcal Conjugate 13-Valent 10/14/2015   • Pneumococcal Polysaccharide PPV23 01/01/2007, 04/27/2017      Health Maintenance: There are no preventive care reminders to display for this patient  Topic Date Due   • COVID-19 Vaccine (3 - Booster for Pfizer series) 08/17/2021      Medicare Screening Tests and Risk Assessments:     Taras Elder is here for her Subsequent Wellness visit  Health Risk Assessment:   Patient rates overall health as fair  Patient feels that their physical health rating is slightly worse  Patient is dissatisfied with their life  Eyesight was rated as same  Hearing was rated as slightly worse  Patients states they are never, rarely angry  Patient states they are always unusually tired/fatigued  Pain experienced in the last 7 days has been some  Patient states that she has experienced no weight loss or gain in last 6 months  Hearing: New hearing aids    Depression Screening:   PHQ-9 Score: 11      Fall Risk Screening: In the past year, patient has experienced: history of falling in past year    Number of falls: 2 or more  Injured during fall?: Yes    Feels unsteady when standing or walking?: Yes    Worried about falling?: Yes      Urinary Incontinence Screening:   Patient has leaked urine accidently in the last six months  Home Safety:  Patient has trouble with stairs inside or outside of their home  Patient has working smoke alarms and has no working carbon monoxide detector  Home safety hazards include: none  Nutrition:   Current diet is Other (please comment)   Soups, fruits, vegetables, yogurts, tuna, and sandwiches  Medications:   Patient is currently taking over-the-counter supplements  OTC medications include: see medication list  Patient is able to manage medications  Activities of Daily Living (ADLs)/Instrumental Activities of Daily Living (IADLs):   Walk and transfer into and out of bed and chair?: Yes  Dress and groom yourself?: Yes    Bathe or shower yourself?: Yes    Feed yourself? Yes  Do your laundry/housekeeping?: No  Manage your money, pay your bills and track your expenses?: Yes  Make your own meals?: Yes    Do your own shopping?: No    ADL comments: Helped by son  Previous Hospitalizations:   Any hospitalizations or ED visits within the last 12 months?: Yes    How many hospitalizations have you had in the last year?: 1-2    Advance Care Planning:   Living will: Yes    Durable POA for healthcare: Yes      Cognitive Screening:   Provider or family/friend/caregiver concerned regarding cognition?: No    PREVENTIVE SCREENINGS      Cardiovascular Screening:    General: History Lipid Disorder and Screening Current      Diabetes Screening:     General: Screening Current      Colorectal Cancer Screening:     General: Screening Not Indicated      Breast Cancer Screening:     General: Screening Not Indicated      Cervical Cancer Screening:    General: Screening Not Indicated      Osteoporosis Screening:    General: History Osteoporosis and Screening Current      Lung Cancer Screening:     General: Screening Not Indicated    Screening, Brief Intervention, and Referral to Treatment (SBIRT)    Screening  Typical number of drinks in a day: 0  Typical number of drinks in a week: 0  Interpretation: Low risk drinking behavior      Single Item Drug Screening:  How often have you used an illegal drug (including marijuana) or a prescription medication for non-medical reasons in the past year? never    Single Item Drug Screen Score: 0  Interpretation: Negative screen for possible drug use disorder    No exam data present     Physical Exam:     /58 (BP Location: Left arm, Patient Position: Sitting, Cuff Size: Adult)   Pulse 64   Temp 97 7 °F (36 5 °C) (Oral)   Resp 20   Ht 5' 3 5" (1 613 m)   Wt 60 1 kg (132 lb 6 4 oz)   SpO2 98%   BMI 23 09 kg/m²     Physical Exam  Vitals reviewed  Constitutional:       Appearance: Normal appearance  HENT:      Head: Normocephalic and atraumatic  Eyes:      General:         Right eye: No discharge  Left eye: No discharge  Conjunctiva/sclera: Conjunctivae normal    Neck:      Vascular: No carotid bruit  Cardiovascular:      Rate and Rhythm: Normal rate and regular rhythm  Heart sounds: Normal heart sounds  No murmur heard  No friction rub  No gallop  Pulmonary:      Effort: Pulmonary effort is normal  No respiratory distress  Breath sounds: Normal breath sounds  No wheezing or rales  Abdominal:      General: Bowel sounds are normal  There is no distension  Palpations: Abdomen is soft  Tenderness: There is no abdominal tenderness  Musculoskeletal:         General: No swelling, tenderness or deformity  Cervical back: Normal range of motion and neck supple  No muscular tenderness  Comments: Use walker   Lymphadenopathy:      Cervical: No cervical adenopathy  Neurological:      Mental Status: She is alert     Psychiatric:         Mood and Affect: Mood normal           Meg Weinstein MD

## 2022-11-03 LAB
BASOPHILS # BLD AUTO: 31 CELLS/UL (ref 0–200)
BASOPHILS NFR BLD AUTO: 0.6 %
CHOLEST SERPL-MCNC: 227 MG/DL
CHOLEST/HDLC SERPL: 2.9 (CALC)
EOSINOPHIL # BLD AUTO: 321 CELLS/UL (ref 15–500)
EOSINOPHIL NFR BLD AUTO: 6.3 %
ERYTHROCYTE [DISTWIDTH] IN BLOOD BY AUTOMATED COUNT: 12.6 % (ref 11–15)
HBA1C MFR BLD: 5.4 % OF TOTAL HGB
HCT VFR BLD AUTO: 40.4 % (ref 35–45)
HDLC SERPL-MCNC: 78 MG/DL
HGB BLD-MCNC: 13.3 G/DL (ref 11.7–15.5)
LDLC SERPL CALC-MCNC: 130 MG/DL (CALC)
LYMPHOCYTES # BLD AUTO: 872 CELLS/UL (ref 850–3900)
LYMPHOCYTES NFR BLD AUTO: 17.1 %
MCH RBC QN AUTO: 30.4 PG (ref 27–33)
MCHC RBC AUTO-ENTMCNC: 32.9 G/DL (ref 32–36)
MCV RBC AUTO: 92.2 FL (ref 80–100)
MONOCYTES # BLD AUTO: 479 CELLS/UL (ref 200–950)
MONOCYTES NFR BLD AUTO: 9.4 %
NEUTROPHILS # BLD AUTO: 3397 CELLS/UL (ref 1500–7800)
NEUTROPHILS NFR BLD AUTO: 66.6 %
NONHDLC SERPL-MCNC: 149 MG/DL (CALC)
PLATELET # BLD AUTO: 245 THOUSAND/UL (ref 140–400)
PMV BLD REES-ECKER: 9.9 FL (ref 7.5–12.5)
RBC # BLD AUTO: 4.38 MILLION/UL (ref 3.8–5.1)
TRIGL SERPL-MCNC: 86 MG/DL
TSH SERPL-ACNC: 3.05 MIU/L (ref 0.4–4.5)
WBC # BLD AUTO: 5.1 THOUSAND/UL (ref 3.8–10.8)

## 2022-11-18 ENCOUNTER — TELEPHONE (OUTPATIENT)
Dept: FAMILY MEDICINE CLINIC | Facility: CLINIC | Age: 82
End: 2022-11-18

## 2022-11-18 NOTE — TELEPHONE ENCOUNTER
Patient was called by rheumatologist office, Dr Mayank Valerio, and told she had abnormal protein study and suggested to follow with hematologist, Dr Rodolfo Arroyo  Told to contact PCP to discuss

## 2022-11-28 ENCOUNTER — OFFICE VISIT (OUTPATIENT)
Dept: DENTISTRY | Facility: CLINIC | Age: 82
End: 2022-11-28

## 2022-11-28 VITALS — DIASTOLIC BLOOD PRESSURE: 56 MMHG | SYSTOLIC BLOOD PRESSURE: 105 MMHG | HEART RATE: 87 BPM | TEMPERATURE: 96.9 F

## 2022-11-28 DIAGNOSIS — K02.9 CARIES: Primary | ICD-10-CM

## 2022-11-28 NOTE — PROGRESS NOTES
Composite Filling    Emilychaparro Mcdanielnorman presents for composite filling #13 MOD  PMH reviewed, no changes  ASA 2, pain 2  Discussed with patient need for RCT if pulp exposure occurs or in future if pulp is inflamed  Pt understands and consents  Applied topical benzocaine, administered 1 carp 4% septocaine 1:100k epi via buccal infiltration  Prepped tooth #13 MOD with 245 carbide on high speed  Caries removed with round carbide on slow speed  Placed don matrix  Isolation with cotton rolls and dri-angles  Dried prep applied limelite and cured  Etch with 37% H2PO4, rinse, dry  Applied Adhese with 20 second scrub once, gentle air dry and light cured for 10s  Restored with Tetric bulk elidia shade A2 and light cured  Refined with finishing burs, polished with enhance point  Verified occlusion and contacts  Pt left satisfied      NV: periodic

## 2022-12-16 ENCOUNTER — RA CDI HCC (OUTPATIENT)
Dept: OTHER | Facility: HOSPITAL | Age: 82
End: 2022-12-16

## 2022-12-16 NOTE — PROGRESS NOTES
[address] JAYLIN [facility] DOS 10/4/21 neurology    [id number] documented and billed      As per MD "She has a known left footdrop   She did undergo EMG of the lower extremities which showed a polyneuropathy and a left l4/l5 radiculopathy in June of 2015"

## 2023-04-18 PROBLEM — F33.9 DEPRESSION, RECURRENT (HCC): Status: ACTIVE | Noted: 2023-04-18

## 2023-04-18 PROBLEM — E11.9 TYPE 2 DIABETES MELLITUS WITHOUT COMPLICATION, WITHOUT LONG-TERM CURRENT USE OF INSULIN (HCC): Status: RESOLVED | Noted: 2023-04-18 | Resolved: 2023-04-18

## 2023-04-18 PROBLEM — I82.B11 ACUTE EMBOLISM AND THROMBOSIS OF RIGHT SUBCLAVIAN VEIN (HCC): Status: ACTIVE | Noted: 2023-04-18

## 2023-04-18 PROBLEM — E11.9 TYPE 2 DIABETES MELLITUS WITHOUT COMPLICATION, WITHOUT LONG-TERM CURRENT USE OF INSULIN (HCC): Status: ACTIVE | Noted: 2023-04-18

## 2023-06-06 ENCOUNTER — OFFICE VISIT (OUTPATIENT)
Dept: NEUROLOGY | Facility: CLINIC | Age: 83
End: 2023-06-06
Payer: MEDICARE

## 2023-06-06 VITALS
HEIGHT: 64 IN | DIASTOLIC BLOOD PRESSURE: 64 MMHG | TEMPERATURE: 98.3 F | WEIGHT: 129.9 LBS | SYSTOLIC BLOOD PRESSURE: 130 MMHG | OXYGEN SATURATION: 99 % | HEART RATE: 60 BPM | BODY MASS INDEX: 22.18 KG/M2

## 2023-06-06 DIAGNOSIS — G62.9 PERIPHERAL NEUROPATHY: Primary | ICD-10-CM

## 2023-06-06 DIAGNOSIS — G43.009 MIGRAINE WITHOUT AURA AND WITHOUT STATUS MIGRAINOSUS, NOT INTRACTABLE: ICD-10-CM

## 2023-06-06 DIAGNOSIS — M54.16 RADICULOPATHY, LUMBAR REGION: ICD-10-CM

## 2023-06-06 DIAGNOSIS — E67.8 EXCESSIVE VITAMIN B6 INTAKE: ICD-10-CM

## 2023-06-06 DIAGNOSIS — E53.1 PYRIDOXINE DEFICIENCY: ICD-10-CM

## 2023-06-06 PROCEDURE — 99215 OFFICE O/P EST HI 40 MIN: CPT | Performed by: PHYSICIAN ASSISTANT

## 2023-06-06 NOTE — PROGRESS NOTES
Review of Systems   Constitutional: Positive for appetite change (loss of appetite )  Negative for fever  HENT: Negative  Negative for hearing loss, tinnitus, trouble swallowing and voice change  Eyes: Negative  Negative for photophobia, pain and visual disturbance  Respiratory: Negative  Negative for shortness of breath  Cardiovascular: Negative  Negative for palpitations  Gastrointestinal: Negative  Negative for nausea and vomiting  Endocrine: Negative  Negative for cold intolerance  Genitourinary: Negative  Negative for dysuria, frequency and urgency  Musculoskeletal: Negative  Negative for gait problem, myalgias and neck pain  Skin: Negative  Negative for rash  Allergic/Immunologic: Negative  Neurological: Negative  Negative for dizziness, tremors, seizures, syncope, facial asymmetry, speech difficulty, weakness, light-headedness, numbness and headaches  Hematological: Bruises/bleeds easily (arms)  Psychiatric/Behavioral: Negative  Negative for confusion, hallucinations and sleep disturbance  All other systems reviewed and are negative

## 2023-06-06 NOTE — PROGRESS NOTES
Patient ID: Rajinder Barbosa is a 78 y o  female  Assessment/Plan:    Peripheral neuropathy, hereditary/idiopathic  · Pt reports no worsening of symptoms  · She will continue duloxetine  · Repeat vitamin B6 level  I have spent a total time of 45 minutes on 06/06/23 in caring for this patient including Diagnostic results, Prognosis, Risks and benefits of tx options, Instructions for management, Patient and family education, Importance of tx compliance, Risk factor reductions, Impressions, Counseling / Coordination of care, Documenting in the medical record, Reviewing / ordering tests, medicine, procedures   and Obtaining or reviewing history    She will follow-up in 1 year  Lumbar radiculopathy  · Stable  · Pt will continue using the walker  Migraine  · She will continue topiramate 50mg daily  · She has not had any migraines since starting topiramate  Problem List Items Addressed This Visit        Cardiovascular and Mediastinum    Migraine     · She will continue topiramate 50mg daily  · She has not had any migraines since starting topiramate  Other Visit Diagnoses     Peripheral neuropathy    -  Primary    Radiculopathy, lumbar region        Excessive vitamin B6 intake        Relevant Orders    Vitamin B6    Pyridoxine deficiency        Relevant Orders    Vitamin B6             Subjective:    TAN Fowler is a 79 yo woman, with fibromyalgia, HTN, hyperlipidemia, and lumbar spinal degenerative disease, who follows in the office due to peripheral neuropathy and migraine headaches  She was last seen by Dr Janine Bettencourt in March of 2022  She does have a left foot drop stemming prior radiculopathy and does utilize an Afo brace  EMG of the lower extremities showed a polyneuropathy and a left l4/l5 radiculopathy in June of 2015  She did undergo a l2/S1 laminectomy and decompression in march 2016  She continues to have a gait dysfunction and Lt foot drop   Her son helps her with appts as "she does not drive  She lives alone, after the death of her  2 years ago  Her son lives next door  She is not taking gabapentin due to side effects in the past  She is taking Cymbalta 60mg daily which helps to control symptoms  She has had a few falls since her last appt  She denies any dizziness and states that she just loses her balance  She does not have a med alert system but tries to keep her cell phone close to her  Overall her migraines are well controlled on Topamax 50 mg a day  She denies any migraine headaches since starting the medication  She has had an elevated B6 level in the past     Decreased vibration Rt ankle  Painful on Lt  The following portions of the patient's history were reviewed and updated as appropriate: allergies, current medications, past family history, past medical history, past social history, past surgical history and problem list          Objective:    Blood pressure 130/64, pulse 60, temperature 98 3 °F (36 8 °C), temperature source Temporal, height 5' 4\" (1 626 m), weight 58 9 kg (129 lb 14 4 oz), SpO2 99 %  Physical Exam  Vitals reviewed  Eyes:      General: Lids are normal       Extraocular Movements: Extraocular movements intact  Pupils: Pupils are equal, round, and reactive to light  Neurological:      Coordination: Coordination is intact  Deep Tendon Reflexes: Reflexes are normal and symmetric  Psychiatric:         Speech: Speech normal          Neurological Exam  Mental Status   Oriented to person, place, time and situation  Recent and remote memory are intact  Speech is normal  Language is fluent with no aphasia  Attention and concentration are normal  Fund of knowledge is appropriate for level of education  Apraxia absent  Cranial Nerves  CN II: Visual acuity is normal  Visual fields full to confrontation  CN III, IV, VI: Extraocular movements intact bilaterally  Normal lids and orbits bilaterally   Pupils equal round and reactive to " light bilaterally  CN V: Facial sensation is normal   CN VII: Full and symmetric facial movement  CN VIII: Hearing is normal   CN IX, X: Palate elevates symmetrically  Normal gag reflex  CN XI: Shoulder shrug strength is normal   CN XII: Tongue midline without atrophy or fasciculations  Motor   Strength is 5/5 in all four extremities except as noted  Lt foot drop  Sensory  Vibration abnormality:   Decreased Lt, Painful Rt to vibration  Reflexes  Deep tendon reflexes are 2+ and symmetric in all four extremities  Coordination    Finger-to-nose, rapid alternating movements and heel-to-shin normal bilaterally without dysmetria  Gait    Uses a rollator  ROS:    Agree with ROS as documented by Chica Valdez LPN  Review of systems personally reviewed

## 2023-06-06 NOTE — PATIENT INSTRUCTIONS
Peripheral neuropathy, hereditary/idiopathic  Pt reports no worsening of symptoms  She will continue duloxetine  Repeat vitamin B6 level  I have spent a total time of 45 minutes on 06/06/23 in caring for this patient including Diagnostic results, Prognosis, Risks and benefits of tx options, Instructions for management, Patient and family education, Importance of tx compliance, Risk factor reductions, Impressions, Counseling / Coordination of care, Documenting in the medical record, Reviewing / ordering tests, medicine, procedures   and Obtaining or reviewing history    She will follow-up in 1 year  Lumbar radiculopathy  Stable  Pt will continue using the walker  Migraine  She will continue topiramate 50mg daily  She has not had any migraines since starting topiramate

## 2023-06-06 NOTE — ASSESSMENT & PLAN NOTE
· She will continue topiramate 50mg daily  · She has not had any migraines since starting topiramate

## 2023-06-06 NOTE — ASSESSMENT & PLAN NOTE
· Pt reports no worsening of symptoms  · She will continue duloxetine  · Repeat vitamin B6 level  I have spent a total time of 45 minutes on 06/06/23 in caring for this patient including Diagnostic results, Prognosis, Risks and benefits of tx options, Instructions for management, Patient and family education, Importance of tx compliance, Risk factor reductions, Impressions, Counseling / Coordination of care, Documenting in the medical record, Reviewing / ordering tests, medicine, procedures   and Obtaining or reviewing history    She will follow-up in 1 year

## 2023-06-26 DIAGNOSIS — G43.909 MIGRAINE: ICD-10-CM

## 2023-06-26 RX ORDER — TOPIRAMATE 50 MG/1
TABLET, FILM COATED ORAL
Qty: 90 TABLET | Refills: 3 | Status: SHIPPED | OUTPATIENT
Start: 2023-06-26

## 2023-06-27 ENCOUNTER — OFFICE VISIT (OUTPATIENT)
Dept: PODIATRY | Facility: CLINIC | Age: 83
End: 2023-06-27
Payer: MEDICARE

## 2023-06-27 VITALS
SYSTOLIC BLOOD PRESSURE: 105 MMHG | DIASTOLIC BLOOD PRESSURE: 83 MMHG | BODY MASS INDEX: 22.02 KG/M2 | WEIGHT: 129 LBS | RESPIRATION RATE: 18 BRPM | HEIGHT: 64 IN | HEART RATE: 85 BPM

## 2023-06-27 DIAGNOSIS — M20.42 HAMMER TOE OF LEFT FOOT: ICD-10-CM

## 2023-06-27 DIAGNOSIS — G89.29 CHRONIC PAIN OF BOTH FEET: ICD-10-CM

## 2023-06-27 DIAGNOSIS — M79.671 CHRONIC PAIN OF BOTH FEET: ICD-10-CM

## 2023-06-27 DIAGNOSIS — L60.3 NAIL DYSTROPHY: ICD-10-CM

## 2023-06-27 DIAGNOSIS — M79.672 CHRONIC PAIN OF BOTH FEET: ICD-10-CM

## 2023-06-27 DIAGNOSIS — I73.00 RAYNAUD'S DISEASE WITHOUT GANGRENE: Primary | ICD-10-CM

## 2023-06-27 PROCEDURE — 99202 OFFICE O/P NEW SF 15 MIN: CPT | Performed by: PODIATRIST

## 2023-06-27 NOTE — PROGRESS NOTES
"Assessment/Plan:    I personally reviewed the results of an arterial Doppler dated 5/16/2022  They were negative for arterial blockage  Explained to patient that she appears to be dealing with Raynauds disease  Explained that this is vasoconstriction usually due to cold  To try to relieve discomfort, vasodilators are a treatment option but they can cause headaches  Patient states that she already gets migraines regularly  No specific treatment advised other than keeping feet warm  Debridement performed of dystrophic toenail right foot  Reappoint as needed  No problem-specific Assessment & Plan notes found for this encounter  Diagnoses and all orders for this visit:    Raynaud's disease without gangrene    Chronic pain of both feet  -     Ambulatory Referral to Podiatry    Hammer toe of left foot    Nail dystrophy          Subjective:      Patient ID: Alex Douglas is a 78 y o  female  HPI     Patient, an 43-year-old female presents with chronic foot pain  The patient notes that her left lower extremity and foot is more painful than the right  The patient notes that her feet are red  She has had vascular studies in the past and they were read as within normal limits for vascular disease  Patient tries to wear socks as much as possible  Patient notes that she takes Cymbalta on a regular basis  She has been diagnosed with fibromyalgia for years  Patient ambulates with a walker  Patient also notes discomfort due to a thickened dystrophic right great toenail  The following portions of the patient's history were reviewed and updated as appropriate: allergies, current medications, past family history, past medical history, past social history, past surgical history and problem list     Review of Systems   Gastrointestinal:        Reflux   Musculoskeletal: Positive for gait problem               Objective:      /83   Pulse 85   Resp 18   Ht 5' 4\" (1 626 m)   Wt 58 5 kg (129 " lb)   BMI 22 14 kg/m²          Physical Exam  Constitutional:       Appearance: Normal appearance  Cardiovascular:      Pulses: Normal pulses  Comments: Both feet are markedly erythematous  There is a degree of cyanosis affecting the left foot  Musculoskeletal:         General: Deformity present  Normal range of motion  Comments: All left foot digits are hammertoe deformities  Skin:     Comments: No open areas noted on either foot  Neurological:      General: No focal deficit present  Mental Status: She is oriented to person, place, and time

## 2023-07-08 NOTE — CASE COMMUNICATION
Hi Dr Rob Castaneda, I wanted to confirm any recommendations for increased weight bearing, sling wearing schedule prn, any ROM limitations at this time for progression with her R UE home therapy 
Jorge Carter,     Thank you for confirming no ROM limitations  I see Ms Celso Carrel today and am still requesting confirmation on Weight Bearing status and recommended sling wearing schedule  Is she WBAT or continue NWB? Also, how often and during what activities do you suggest she wear the sling (ex: during ambulation?) or can she dc it now? Thank you for confirming  Alissa Gabriel VNA        ----- Message -----  From: Michael Barnes mm, DO  Sent: 6/21/2022   7:59 AM EDT  To: Detroit Cushing, OTR      Hello, no range-of-motion limitations   ----- Message -----  From: Carolynn Romberg  Sent: 6/20/2022   1:06 PM EDT  To: DO Jorge Wilson Dr, I wanted to confirm any recommendations for increased weight bearing, sling wearing schedule prn, any ROM limitations at this time for progression with her R UE home therapy 
Monthly

## 2023-08-09 DIAGNOSIS — E03.9 HYPOTHYROIDISM, UNSPECIFIED TYPE: ICD-10-CM

## 2023-08-09 RX ORDER — LEVOTHYROXINE SODIUM 88 UG/1
88 TABLET ORAL DAILY
Qty: 90 TABLET | Refills: 3 | Status: SHIPPED | OUTPATIENT
Start: 2023-08-09

## 2023-10-18 ENCOUNTER — OFFICE VISIT (OUTPATIENT)
Dept: FAMILY MEDICINE CLINIC | Facility: CLINIC | Age: 83
End: 2023-10-18
Payer: MEDICARE

## 2023-10-18 VITALS
HEART RATE: 90 BPM | HEIGHT: 64 IN | RESPIRATION RATE: 16 BRPM | OXYGEN SATURATION: 96 % | DIASTOLIC BLOOD PRESSURE: 64 MMHG | SYSTOLIC BLOOD PRESSURE: 100 MMHG | BODY MASS INDEX: 20.49 KG/M2 | WEIGHT: 120 LBS | TEMPERATURE: 97.5 F

## 2023-10-18 DIAGNOSIS — G43.009 MIGRAINE WITHOUT AURA AND WITHOUT STATUS MIGRAINOSUS, NOT INTRACTABLE: ICD-10-CM

## 2023-10-18 DIAGNOSIS — M79.7 FIBROMYALGIA: Primary | ICD-10-CM

## 2023-10-18 DIAGNOSIS — Z00.00 MEDICARE ANNUAL WELLNESS VISIT, SUBSEQUENT: ICD-10-CM

## 2023-10-18 DIAGNOSIS — I10 PRIMARY HYPERTENSION: Chronic | ICD-10-CM

## 2023-10-18 DIAGNOSIS — E03.9 HYPOTHYROIDISM, UNSPECIFIED TYPE: ICD-10-CM

## 2023-10-18 DIAGNOSIS — R73.01 IMPAIRED FASTING GLUCOSE: ICD-10-CM

## 2023-10-18 DIAGNOSIS — E78.5 HYPERLIPIDEMIA, UNSPECIFIED HYPERLIPIDEMIA TYPE: ICD-10-CM

## 2023-10-18 DIAGNOSIS — Z23 ENCOUNTER FOR IMMUNIZATION: ICD-10-CM
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
## 2023-10-18 PROBLEM — I82.B11 ACUTE EMBOLISM AND THROMBOSIS OF RIGHT SUBCLAVIAN VEIN (HCC): Status: RESOLVED | Noted: 2023-04-18 | Resolved: 2023-10-18

## 2023-10-18 PROCEDURE — G0439 PPPS, SUBSEQ VISIT: HCPCS | Performed by: FAMILY MEDICINE

## 2023-10-18 PROCEDURE — 90662 IIV NO PRSV INCREASED AG IM: CPT

## 2023-10-18 PROCEDURE — G0008 ADMIN INFLUENZA VIRUS VAC: HCPCS

## 2023-10-18 PROCEDURE — 99214 OFFICE O/P EST MOD 30 MIN: CPT | Performed by: FAMILY MEDICINE

## 2023-10-18 NOTE — PATIENT INSTRUCTIONS
Medicare Preventive Visit Patient Instructions  Thank you for completing your Welcome to Medicare Visit or Medicare Annual Wellness Visit today. Your next wellness visit will be due in one year (10/18/2024). The screening/preventive services that you may require over the next 5-10 years are detailed below. Some tests may not apply to you based off risk factors and/or age. Screening tests ordered at today's visit but not completed yet may show as past due. Also, please note that scanned in results may not display below. Preventive Screenings:  Service Recommendations Previous Testing/Comments   Colorectal Cancer Screening  * Colonoscopy    * Fecal Occult Blood Test (FOBT)/Fecal Immunochemical Test (FIT)  * Fecal DNA/Cologuard Test  * Flexible Sigmoidoscopy Age: 43-73 years old   Colonoscopy: every 10 years (may be performed more frequently if at higher risk)  OR  FOBT/FIT: every 1 year  OR  Cologuard: every 3 years  OR  Sigmoidoscopy: every 5 years  Screening may be recommended earlier than age 39 if at higher risk for colorectal cancer. Also, an individualized decision between you and your healthcare provider will decide whether screening between the ages of 77-80 would be appropriate. Colonoscopy: Not on file  FOBT/FIT: Not on file  Cologuard: Not on file  Sigmoidoscopy: Not on file    Screening Not Indicated     Breast Cancer Screening Age: 36 years old  Frequency: every 1-2 years  Not required if history of left and right mastectomy Mammogram: Not on file    Screening Not Indicated   Cervical Cancer Screening Between the ages of 21-29, pap smear recommended once every 3 years. Between the ages of 32-69, can perform pap smear with HPV co-testing every 5 years.    Recommendations may differ for women with a history of total hysterectomy, cervical cancer, or abnormal pap smears in past. Pap Smear: Not on file    Screening Not Indicated   Hepatitis C Screening Once for adults born between [address] [place] [address] frequently in patients at high risk for Hepatitis C Hep C Antibody: Not on file        Diabetes Screening 1-2 times per year if you're at risk for diabetes or have pre-diabetes Fasting glucose: No results in last 5 years (No results in last 5 years)  A1C: 5.4 % of total Hgb (11/3/2022)  Screening Current   Cholesterol Screening Once every 5 years if you don't have a lipid disorder. May order more often based on risk factors. Lipid panel: 11/03/2022    Screening Not Indicated  History Lipid Disorder     Other Preventive Screenings Covered by Medicare:  Abdominal Aortic Aneurysm (AAA) Screening: covered once if your at risk. You're considered to be at risk if you have a family history of AAA. Lung Cancer Screening: covers low dose CT scan once per year if you meet all of the following conditions: (1) Age 48-67; (2) No signs or symptoms of lung cancer; (3) Current smoker or have quit smoking within the last 15 years; (4) You have a tobacco smoking history of at least 20 pack years (packs per day multiplied by number of years you smoked); (5) You get a written order from a healthcare provider. Glaucoma Screening: covered annually if you're considered high risk: (1) You have diabetes OR (2) Family history of glaucoma OR (3)  aged 48 and older OR (3)  American aged 72 and older  Osteoporosis Screening: covered every 2 years if you meet one of the following conditions: (1) You're estrogen deficient and at risk for osteoporosis based off medical history and other findings; (2) Have a vertebral abnormality; (3) On glucocorticoid therapy for more than 3 months; (4) Have primary hyperparathyroidism; (5) On osteoporosis medications and need to assess response to drug therapy. Last bone density test (DXA Scan): 05/16/2022. HIV Screening: covered annually if you're between the age of 14-79. Also covered annually if you are younger than 13 and older than 72 with risk factors for HIV infection.  For pregnant patients, it is covered up to 3 times per pregnancy. Immunizations:  Immunization Recommendations   Influenza Vaccine Annual influenza vaccination during flu season is recommended for all persons aged >= 6 months who do not have contraindications   Pneumococcal Vaccine   * Pneumococcal conjugate vaccine = PCV13 (Prevnar 13), PCV15 (Vaxneuvance), PCV20 (Prevnar 20)  * Pneumococcal polysaccharide vaccine = PPSV23 (Pneumovax) Adults 48-28 yo with certain risk factors or if 69+ yo  If never received any pneumonia vaccine: recommend Prevnar 20 (PCV20)  Give PCV20 if previously received 1 dose of PCV13 or PPSV23   Hepatitis B Vaccine 3 dose series if at intermediate or high risk (ex: diabetes, end stage renal disease, liver disease)   Respiratory syncytial virus (RSV) Vaccine - COVERED BY MEDICARE PART D  * RSVPreF3 (Arexvy) CDC recommends that adults 61years of age and older may receive a single dose of RSV vaccine using shared clinical decision-making (SCDM)   Tetanus (Td) Vaccine - COST NOT COVERED BY MEDICARE PART B Following completion of primary series, a booster dose should be given every 10 years to maintain immunity against tetanus. Td may also be given as tetanus wound prophylaxis. Tdap Vaccine - COST NOT COVERED BY MEDICARE PART B Recommended at least once for all adults. For pregnant patients, recommended with each pregnancy. Shingles Vaccine (Shingrix) - COST NOT COVERED BY MEDICARE PART B  2 shot series recommended in those 19 years and older who have or will have weakened immune systems or those 50 years and older     Health Maintenance Due:  There are no preventive care reminders to display for this patient. Immunizations Due:      Topic Date Due    COVID-19 Vaccine (3 - Pfizer series) 05/12/2021    Influenza Vaccine (1) 09/01/2023     Advance Directives   What are advance directives? Advance directives are legal documents that state your wishes and plans for medical care.  These plans are made ahead of time in case you lose your ability to make decisions for yourself. Advance directives can apply to any medical decision, such as the treatments you want, and if you want to donate organs. What are the types of advance directives? There are many types of advance directives, and each state has rules about how to use them. You may choose a combination of any of the following:  Living will: This is a written record of the treatment you want. You can also choose which treatments you do not want, which to limit, and which to stop at a certain time. This includes surgery, medicine, IV fluid, and tube feedings. Durable power of  for healthcare [facility]): This is a written record that states who you want to make healthcare choices for you when you are unable to make them for yourself. This person, called a proxy, is usually a family member or a friend. You may choose more than 1 proxy. Do not resuscitate (DNR) order:  A DNR order is used in case your heart stops beating or you stop breathing. It is a request not to have certain forms of treatment, such as CPR. A DNR order may be included in other types of advance directives. Medical directive: This covers the care that you want if you are in a coma, near death, or unable to make decisions for yourself. You can list the treatments you want for each condition. Treatment may include pain medicine, surgery, blood transfusions, dialysis, IV or tube feedings, and a ventilator (breathing machine). Values history: This document has questions about your views, beliefs, and how you feel and think about life. This information can help others choose the care that you would choose. Why are advance directives important? An advance directive helps you control your care. Although spoken wishes may be used, it is better to have your wishes written down. Spoken wishes can be misunderstood, or not followed.  Treatments may be given even if you do not want them. An advance directive may make it easier for your family to make difficult choices about your care. Urinary Incontinence   Urinary incontinence (UI)  is when you lose control of your bladder. UI develops because your bladder cannot store or empty urine properly. The 3 most common types of UI are stress incontinence, urge incontinence, or both. Medicines:   May be given to help strengthen your bladder control. Report any side effects of medication to your healthcare provider. Do pelvic muscle exercises often:  Your pelvic muscles help you stop urinating. Squeeze these muscles tight for 5 seconds, then relax for 5 seconds. Gradually work up to squeezing for 10 seconds. Do 3 sets of 15 repetitions a day, or as directed. This will help strengthen your pelvic muscles and improve bladder control. Train your bladder:  Go to the bathroom at set times, such as every 2 hours, even if you do not feel the urge to go. You can also try to hold your urine when you feel the urge to go. For example, hold your urine for 5 minutes when you feel the urge to go. As that becomes easier, hold your urine for 10 minutes. Self-care:   Keep a UI record. Write down how often you leak urine and how much you leak. Make a note of what you were doing when you leaked urine. Drink liquids as directed. You may need to limit the amount of liquid you drink to help control your urine leakage. Do not drink any liquid right before you go to bed. Limit or do not have drinks that contain caffeine or alcohol. Prevent constipation. Eat a variety of high-fiber foods. Good examples are high-fiber cereals, beans, vegetables, and whole-grain breads. Walking is the best way to trigger your intestines to have a bowel movement. Exercise regularly and maintain a healthy weight. Weight loss and exercise will decrease pressure on your bladder and help you control your leakage. Use a catheter as directed  to help empty your bladder.  A catheter is a tiny, plastic tube that is put into your bladder to drain your urine. Go to behavior therapy as directed. Behavior therapy may be used to help you learn to control your urge to urinate. © Copyright [address] 2018 Information is for End User's use only and may not be sold, redistributed or otherwise used for commercial purposes. All illustrations and images included in CareNotes® are the copyrighted property of [employer] or [address] [facility] about treatment of osteoporosis.

## 2023-10-18 NOTE — PROGRESS NOTES
Assessment and Plan:     Chief Complaint   Patient presents with    Medicare Wellness Visit     Annual, Pt states fibromyalgia pain pain level 4     Health Maintenance   Topic Date Due    COVID-19 Vaccine (3 - Pfizer series) 05/12/2021    Medicare Annual Wellness Visit (AWV)  10/11/2023    Fall Risk  10/18/2024    Urinary Incontinence Screening  10/18/2024    BMI: Adult  10/18/2024    Osteoporosis Screening  Completed    Pneumococcal Vaccine: 65+ Years  Completed    Influenza Vaccine  Completed    HIB Vaccine  Aged Out    IPV Vaccine  Aged Out    Hepatitis A Vaccine  Aged Out    Meningococcal ACWY Vaccine  Aged Out    HPV Vaccine  Aged Out       Problem List Items Addressed This Visit          Endocrine    Hypothyroidism     Continue levothyroxine 88mcg daily. Check labs. Relevant Orders    CBC and differential    Comprehensive metabolic panel    Hemoglobin A1C    Lipid Panel with Direct LDL reflex    TSH, 3rd generation with Free T4 reflex    Impaired fasting glucose     Low carb diet. Check labs. Relevant Orders    CBC and differential    Comprehensive metabolic panel    Hemoglobin A1C    Lipid Panel with Direct LDL reflex    TSH, 3rd generation with Free T4 reflex       Cardiovascular and Mediastinum    Hypertension (Chronic)     Continue lisinopril per cardiology. Relevant Orders    CBC and differential    Comprehensive metabolic panel    Hemoglobin A1C    Lipid Panel with Direct LDL reflex    TSH, 3rd generation with Free T4 reflex    Migraine    Relevant Orders    CBC and differential    Comprehensive metabolic panel    Hemoglobin A1C    Lipid Panel with Direct LDL reflex    TSH, 3rd generation with Free T4 reflex       Other    Fibromyalgia - Primary     Continue cymbalta 60mg QD per rheumatology.           Relevant Orders    CBC and differential    Comprehensive metabolic panel    Hemoglobin A1C    Lipid Panel with Direct LDL reflex    TSH, 3rd generation with Free T4 reflex Hyperlipidemia     Low fat diet. Continue zetia per cardiology. Relevant Orders    CBC and differential    Comprehensive metabolic panel    Hemoglobin A1C    Lipid Panel with Direct LDL reflex    TSH, 3rd generation with Free T4 reflex     Other Visit Diagnoses       Encounter for immunization        Relevant Orders    influenza vaccine, high-dose, PF 0.7 mL (FLUZONE HIGH-DOSE) (Completed)    Medicare annual wellness visit, subsequent                Falls Plan of Care: balance, strength, and gait training instructions were provided. Give flu shot today. Got Covid19 shots. Recommend booster. Got Prevnar 10/2015. Got Pneumovax 2017. Fall precautions. Pt refused PT. Dexa 5/2022 T -3.6. FU rheumatology. RTO in 6 months with labs. POA---Son  Living will----DNR      Preventive health issues were discussed with patient, and age appropriate screening tests were ordered as noted in patient's After Visit Summary. Personalized health advice and appropriate referrals for health education or preventive services given if needed, as noted in patient's After Visit Summary. History of Present Illness:     Patient presents for a Medicare Wellness Visit    HPI     Pt is here by herself. Fibromyalgia/lyme disease----Feels tired always. Always shoulders/back pain.  rheumatology Dr. Kellie Mooney for fibromyalgia/lyme disease/Raynaud's. She is on duloxetine 60mg pm which helped. Osteoporosis---Suggest prolia per rheumatology. Need to do labs. Hypothyroidism---She is on levothyroxine 88 mcg daily. Feels fine. IFG---Tried to eat healthy. 11/2022 hgA1C 5.4 controlled. HTN---She is on lisinopril 5mg bid per cardiology. Pt denies headache, vision change, SOB or CP. FU cardiology Dr. Sidney Newell for Afib s/p ablation 2006 yearly. Denies chest pain, palpitation etc. She is on ASA 81mg daily. Hyperlipidemia---She refused to take statin because of fibromyalgia.  She is on zetia 10mg daily per cardiology. Follows low fat diet. FU neurology for headache/neuropathy. She is on topamax 50mg daily which helped a lot. FU orthopedics for lumbar radiculopathy and lumbar spinal stenosis s/p L2-S2 laminectomy in 2016. Has balance problems. She uses cane/walker. 1 cat and 1 dog live with her. Son Camryn Arango lives next door to her. Does all ADL's. Does not cook. Does not drive. Son helped her. No recent falls. Denies depression. Likes reading. Patient Care Team:  Lilian Mcfadden MD as PCP - MD Michelle Ontiveros MD Madie Needs, MD Norma Buckles, MD (Inactive)  MD Kin Wilhlem DO [facility] Carey Brooks MD     Review of Systems:     Review of Systems   Constitutional:  Negative for appetite change, chills and fever. HENT:  Negative for congestion, ear pain, sinus pain and sore throat. Eyes:  Negative for discharge and itching. Respiratory:  Negative for apnea, cough, chest tightness, shortness of breath and wheezing. Cardiovascular:  Negative for chest pain, palpitations and leg swelling. Gastrointestinal:  Negative for abdominal pain, anal bleeding, constipation, diarrhea, nausea and vomiting. Endocrine: Negative for cold intolerance, heat intolerance and polyuria. Genitourinary:  Negative for difficulty urinating and dysuria. Musculoskeletal:  Positive for arthralgias, back pain and gait problem. Negative for myalgias. Skin:  Negative for rash. Neurological:  Negative for dizziness and headaches. Psychiatric/Behavioral:  Negative for agitation.          Problem List:     Patient Active Problem List   Diagnosis    Lumbar stenosis with neurogenic claudication    Hypertension    Fibromyalgia    Hyperlipidemia    Migraine    S/P Lumbar laminectomy and decompressio L2-S1    Neuropathy    Lumbar radiculopathy    Abnormal chest x-ray    Back pain    Chronic osteomyelitis of left foot (HCC)    Depression with anxiety Esophageal reflux    Generalized pain    Headache    History of DVT (deep vein thrombosis)    Hypothyroidism    Impaired fasting glucose    Insomnia    Knee osteoarthritis    Lung mass    Lyme disease    Memory loss    Peripheral neuropathy, hereditary/idiopathic    Pleural nodules    Solitary fibrous tumor    Statin intolerance    Tinnitus    Varicose veins    Left foot drop    Chronic pain syndrome    Sjogren's syndrome without extraglandular involvement ([address])    Raynaud's disease without gangrene    Spinal stenosis of lumbosacral region    Closed fracture of right clavicle    Cervical spine fracture (HCC)    Closed fracture of one rib of right side    Subclavian artery injury    Senile osteoporosis      Past Medical and Surgical History:     Past Medical History:   Diagnosis Date    CTS (carpal tunnel syndrome)     unspecified laterality    DVT (deep venous thrombosis) ([address])     left leg, s/p IVC filter 11/2015    Endometriosis     Fall down stairs 05/26/2022    Fibromyalgia     Fibromyalgia, primary     Foot drop, left foot     Hypercholesteremia     Hypertension     Hypothyroidism     Lyme disease     treated 8/2015    Migraine     Migraine     Neurogenic claudication due to lumbar spinal stenosis     Osteoarthritis     PAF (paroxysmal atrial fibrillation) ([address])     s/p ablation at [facility] (sees Dr Almedia Kehr at [facility])    PAF (paroxysmal atrial fibrillation) ([address]) 11/17/2015    Overview:  S/p AFib ablation.      Peripheral neuropathy     Raynaud's syndrome without gangrene     Sjogren's syndrome ([address])     Solitary pulmonary nodule on lung CT      Past Surgical History:   Procedure Laterality Date    ABDOMINAL HYSTERECTOMY      APPENDECTOMY      ATRIAL ABLATION SURGERY      cath    BREAST SURGERY Left     puncture aspiration of cyst onset 1972    CHOLECYSTECTOMY      onset 1981    COLONOSCOPY      fiberoptic    FOOT SURGERY Bilateral     onset 1993- removal of mortons neuroma    HAND SURGERY      gaglion cyst removal    HAND SURGERY      onset 1991 - carpal tunnel    IVC FILTER INSERTION      KNEE ARTHROSCOPY Left     therapeutic     LYMPH NODE BIOPSY      1996 onset    DE ARTHRODESIS POSTERIOR/PSTLAT TQ 1NTRSPC LUMBAR N/A 3/29/2016    Procedure: L2-S1 LAMINECTOMY;  Surgeon: Dasha Samano DO;  Location: [facility];  Service: Orthopedics    TOTAL ABDOMINAL HYSTERECTOMY      onset 1989      Family History:     Family History   Problem Relation Age of Onset    Heart disease Father     ADD / ADHD Father     Stroke Father     Cancer Brother         prostate    Heart attack Maternal Grandmother         acute MI    Cancer Family       Social History:     Social History     Socioeconomic History    Marital status: /Civil Union     Spouse name: None    Number of children: None    Years of education: None    Highest education level: None   Occupational History    None   Tobacco Use    Smoking status: Never    Smokeless tobacco: Never   Vaping Use    Vaping Use: Never used   Substance and Sexual Activity    Alcohol use: Yes     Comment: per pt occassional GLASS OF WINE    Drug use: No    Sexual activity: Not Currently   Other Topics Concern    None   Social History Narrative    None     Social Determinants of Health     Financial Resource Strain: Not on file   Food Insecurity: Not on file   Transportation Needs: Not on file   Physical Activity: Not on file   Stress: Not on file   Social Connections: Not on file   Intimate Partner Violence: Not on file   Housing Stability: Not on file      Medications and Allergies:     Current Outpatient Medications   Medication Sig Dispense Refill    Cholecalciferol (VITAMIN D3) 5000 units CAPS Take 1,000 Units by mouth daily       DULoxetine (CYMBALTA) 60 mg delayed release capsule TAKE 1 CAPSULE BY MOUTH  DAILY 90 capsule 1    ezetimibe (ZETIA) 10 mg tablet Take 10 mg by mouth daily.       Iron-Vit C-Vit B12-Folic Acid (IRON 386 PLUS PO) Take 100 mg by mouth in the morning levothyroxine 88 mcg tablet TAKE 1 TABLET BY MOUTH  DAILY 90 tablet 3    lisinopril (ZESTRIL) 5 mg tablet Take 5 mg by mouth 2 (two) times a day  0    Menthol 5.4 MG LOZG Take 1 lozenge every 2 hours as needed.  0    Multiple Vitamin (multivitamin) tablet Take 1 tablet by mouth daily      Multiple Vitamins-Minerals (HAIR SKIN AND NAILS FORMULA PO) Take by mouth      topiramate (TOPAMAX) 50 MG tablet TAKE 1 TABLET BY MOUTH  DAILY 90 tablet 3    chlorhexidine (PERIDEX) 0.12 % solution RINSE MOUTH WITH 15 ML (1 CAPFUL) FOR 30 SECONDS IN MORNING AND E...  (REFER TO PRESCRIPTION NOTES). (Patient not taking: Reported on 6/6/2023)  0     No current facility-administered medications for this visit. Allergies   Allergen Reactions    Betaine     Cranberry Extract - Food Allergy     Cranberry Juice Powder - Food Allergy     Isoflavones (Soy)      Other reaction(s): Itching    Latex      Other reaction(s): Rash and itching    Soybean-Containing Drug Products - Food Allergy     Voltaren [Diclofenac Sodium]       Immunizations:     Immunization History   Administered Date(s) Administered    COVID-19 PFIZER VACCINE 0.3 ML IM 02/23/2021, 03/17/2021    INFLUENZA 10/11/2022    Influenza Split High Dose Preservative Free IM 10/14/2015, 10/21/2016, 10/30/2017    Influenza, high dose seasonal 0.7 mL 11/06/2018, 01/18/2021, 09/02/2021, 10/11/2022, 10/18/2023    Influenza, seasonal, injectable 11/20/2012    Pneumococcal Conjugate 13-Valent 10/14/2015    Pneumococcal Polysaccharide PPV23 01/01/2007, 04/27/2017      Health Maintenance: There are no preventive care reminders to display for this patient. Topic Date Due    COVID-19 Vaccine (3 - Pfizer series) 05/12/2021      Medicare Screening Tests and Risk Assessments:     Uri Yee is here for her Subsequent Wellness visit. Health Risk Assessment:   Patient rates overall health as good. Patient feels that their physical health rating is same.  Patient is dissatisfied with their life. Vero Finger was rated as same. Hearing was rated as slightly worse. Patients states they are never, rarely angry. Patient states they are always unusually tired/fatigued. Pain experienced in the last 7 days has been some. Patient's pain rating has been 5/10. Patient states that she has experienced weight loss or gain in last 6 months. Hearing: New hearing aids    Depression Screening:   PHQ-9 Score: 4      Fall Risk Screening: In the past year, patient has experienced: history of falling in past year    Number of falls: 2 or more  Injured during fall?: No    Feels unsteady when standing or walking?: Yes    Worried about falling?: Yes      Urinary Incontinence Screening:   Patient has leaked urine accidently in the last six months. Once in a while     Home Safety:  Patient has trouble with stairs inside or outside of their home. Patient has working smoke alarms and has no working carbon monoxide detector. Home safety hazards include: none. Nutrition:   Current diet is Other (please comment). Soups, fruits, vegetables, yogurts, tuna, and sandwiches. Medications:   Patient is currently taking over-the-counter supplements. OTC medications include: see medication list. Patient is able to manage medications. Activities of Daily Living (ADLs)/Instrumental Activities of Daily Living (IADLs):   Walk and transfer into and out of bed and chair?: Yes  Dress and groom yourself?: Yes    Bathe or shower yourself?: Yes    Feed yourself? Yes  Do your laundry/housekeeping?: No  Manage your money, pay your bills and track your expenses?: Yes  Make your own meals?: Yes    Do your own shopping?: No    ADL comments: Helped by son. Previous Hospitalizations:   Any hospitalizations or ED visits within the last 12 months?: No    How many hospitalizations have you had in the last year?: 1-2    Advance Care Planning:   Living will: Yes    Durable POA for healthcare:  Yes    Advanced directive: Yes    End of Life Decisions reviewed with patient: Yes    Provider agrees with end of life decisions: Yes      Cognitive Screening:   Provider or family/friend/caregiver concerned regarding cognition?: Yes  Mini-Cog Score: 4  Interpretation: Mini-Cog Score 3-5: Negative screen for dementia     PREVENTIVE SCREENINGS      Cardiovascular Screening:    General: History Lipid Disorder and Risks and Benefits Discussed    Due for: Lipid Panel      Diabetes Screening:     General: Risks and Benefits Discussed    Due for: Blood Glucose      Colorectal Cancer Screening:     General: Screening Not Indicated      Breast Cancer Screening:     General: Screening Not Indicated      Cervical Cancer Screening:    General: Screening Not Indicated      Osteoporosis Screening:    General: History Osteoporosis and Screening Current      Lung Cancer Screening:     General: Screening Not Indicated    Screening, Brief Intervention, and Referral to Treatment (SBIRT)    Screening  Typical number of drinks in a day: 0  Typical number of drinks in a week: 0  Interpretation: Low risk drinking behavior. Single Item Drug Screening:  How often have you used an illegal drug (including marijuana) or a prescription medication for non-medical reasons in the past year? never    Single Item Drug Screen Score: 0  Interpretation: Negative screen for possible drug use disorder    No results found. Physical Exam:     /64   Pulse 90   Temp 97.5 °F (36.4 °C) (Temporal)   Resp 16   Ht 5' 4" (1.626 m)   Wt 54.4 kg (120 lb)   SpO2 96%   BMI 20.60 kg/m²     Physical Exam  Vitals reviewed. Constitutional:       Appearance: Normal appearance. HENT:      Head: Normocephalic and atraumatic. Eyes:      General:         Right eye: No discharge. Left eye: No discharge. Conjunctiva/sclera: Conjunctivae normal.   Neck:      Vascular: No carotid bruit. Cardiovascular:      Rate and Rhythm: Normal rate and regular rhythm. Heart sounds: Normal heart sounds. No murmur heard. No friction rub. No gallop. Pulmonary:      Effort: Pulmonary effort is normal. No respiratory distress. Breath sounds: Normal breath sounds. No wheezing or rales. Abdominal:      General: Bowel sounds are normal. There is no distension. Palpations: Abdomen is soft. Tenderness: There is no abdominal tenderness. Musculoskeletal:         General: No swelling, tenderness or deformity. Cervical back: Normal range of motion and neck supple. No muscular tenderness. Comments: Use walker   Lymphadenopathy:      Cervical: No cervical adenopathy. Neurological:      Mental Status: She is alert.    Psychiatric:         Mood and Affect: Mood normal.          Love Frazier MD

## 2024-01-23 ENCOUNTER — TELEPHONE (OUTPATIENT)
Age: 84
End: 2024-01-23

## 2024-01-30 ENCOUNTER — OFFICE VISIT (OUTPATIENT)
Age: 84
End: 2024-01-30
Payer: MEDICARE

## 2024-01-30 VITALS
DIASTOLIC BLOOD PRESSURE: 62 MMHG | WEIGHT: 149 LBS | OXYGEN SATURATION: 98 % | HEART RATE: 73 BPM | BODY MASS INDEX: 24.83 KG/M2 | SYSTOLIC BLOOD PRESSURE: 118 MMHG | HEIGHT: 65 IN

## 2024-01-30 DIAGNOSIS — M35.00 SJOGREN'S SYNDROME WITHOUT EXTRAGLANDULAR INVOLVEMENT (HCC): ICD-10-CM

## 2024-01-30 DIAGNOSIS — M35.00 SICCA SYNDROME (HCC): ICD-10-CM

## 2024-01-30 DIAGNOSIS — M48.062 LUMBAR STENOSIS WITH NEUROGENIC CLAUDICATION: ICD-10-CM

## 2024-01-30 DIAGNOSIS — E55.9 VITAMIN D INSUFFICIENCY: ICD-10-CM

## 2024-01-30 DIAGNOSIS — G89.4 CHRONIC PAIN SYNDROME: ICD-10-CM

## 2024-01-30 DIAGNOSIS — M81.0 SENILE OSTEOPOROSIS: ICD-10-CM

## 2024-01-30 DIAGNOSIS — M21.372 LEFT FOOT DROP: ICD-10-CM

## 2024-01-30 DIAGNOSIS — M79.7 FIBROMYALGIA: Primary | ICD-10-CM

## 2024-01-30 PROCEDURE — 99214 OFFICE O/P EST MOD 30 MIN: CPT | Performed by: INTERNAL MEDICINE

## 2024-01-30 NOTE — PROGRESS NOTES
Assessment/Plan:    Senile osteoporosis  Review of DEXA from May 2022 significant for osteoporosis right femoral neck and left forearm.  We did discuss treatment with Prolia every 6 months with lab work to obtain 2 weeks prior to each injection, as well as the need for consistent treatment in view of the potential for increased risk for vertebral fractures with abrupt discontinuation of Prolia.  Patient had been given a pamphlet on Prolia at the prior visit.  She is not interested in Prolia at this time, however, she will continue to think about it.  Encourage fall prevention particularly in view of her history of fall requiring hospitalization for multiple trauma.  Complete metabolic workup to rule out secondary causes of osteoporosis with lab work for the next office visit.  Follow-up 6 months or sooner if needed.  Will plan to repeat DEXA in May 2024 to monitor BMD.    Lumbar stenosis with neurogenic claudication  Chronic neurogenic symptoms with history of multiple falls.  She did have significant trauma with multiple fractures secondary to a fall down stairs in May 2022.  She does use a walker for ambulation assistance and remains comfortable in her home.  She is able to care for herself.  Encourage fall prevention.  She is not interested in home physical therapy at this time.  Continue to monitor.    Fibromyalgia  Continue duloxetine.  Monitor labs for medication toxicity.    Left foot drop  Chronic left footdrop postop spine surgery.  She does have a MAFO brace which she rarely wears due to difficulty with the brace.  Continue to monitor.  Follow-up Neurology as scheduled.     Chronic pain syndrome  Fibromyalgia generally stable.  Continue duloxetine.  Did discuss potential for physical therapy including home therapy, however, patient prefers to do her own home exercise program.  Encouraged her to practice fall prevention in view of her history of falls and osteoporosis.  Monitor labs for medication toxicity.   Follow-up 6 months or sooner if needed.    Sjogren's syndrome without extraglandular involvement (HCC)  Sicca symptoms treated symptomatically. Will plan to repeat   SAMMIE with labs ordered for next office visit. Continue to monitor.    Raynaud's disease without gangrene  Raynaud's with no history of digital ulcerations.  Continue cold protection.  Review of prior SAMMIE, double-stranded DNA, and complements normal or negative from March 2022.  Will plan to repeat serologies at the time of the next office visit.         Problem List Items Addressed This Visit     Lumbar stenosis with neurogenic claudication     Chronic neurogenic symptoms with history of multiple falls.  She did have significant trauma with multiple fractures secondary to a fall down stairs in May 2022.  She does use a walker for ambulation assistance and remains comfortable in her home.  She is able to care for herself.  Encourage fall prevention.  She is not interested in home physical therapy at this time.  Continue to monitor.         Fibromyalgia - Primary     Continue duloxetine.  Monitor labs for medication toxicity.         Relevant Orders    C-reactive protein    Sedimentation rate, automated    CBC and differential    Comprehensive metabolic panel    Urinalysis with microscopic    Left foot drop     Chronic left footdrop postop spine surgery.  She does have a MAFO brace which she rarely wears due to difficulty with the brace.  Continue to monitor.  Follow-up Neurology as scheduled.          Chronic pain syndrome     Fibromyalgia generally stable.  Continue duloxetine.  Did discuss potential for physical therapy including home therapy, however, patient prefers to do her own home exercise program.  Encouraged her to practice fall prevention in view of her history of falls and osteoporosis.  Monitor labs for medication toxicity.  Follow-up 6 months or sooner if needed.         Sjogren's syndrome without extraglandular involvement (HCC)     Sicca  symptoms treated symptomatically. Will plan to repeat   SAMMIE with labs ordered for next office visit. Continue to monitor.         Relevant Orders    C-reactive protein    Sedimentation rate, automated    CBC and differential    Comprehensive metabolic panel    Urinalysis with microscopic    C-reactive protein    CBC and differential    Comprehensive metabolic panel    C3 complement    C4 complement    Urinalysis with microscopic    SAMMIE Comprehensive Panel    Senile osteoporosis     Review of DEXA from May 2022 significant for osteoporosis right femoral neck and left forearm.  We did discuss treatment with Prolia every 6 months with lab work to obtain 2 weeks prior to each injection, as well as the need for consistent treatment in view of the potential for increased risk for vertebral fractures with abrupt discontinuation of Prolia.  Patient had been given a pamphlet on Prolia at the prior visit.  She is not interested in Prolia at this time, however, she will continue to think about it.  Encourage fall prevention particularly in view of her history of fall requiring hospitalization for multiple trauma.  Complete metabolic workup to rule out secondary causes of osteoporosis with lab work for the next office visit.  Follow-up 6 months or sooner if needed.  Will plan to repeat DEXA in May 2024 to monitor BMD.         Relevant Orders    Protein electrophoresis, serum    Immunofixation IgA,IgG,IgM Qt.Immunofixation Serum Immunoglobulin    PTH, intact    Celiac Antibodies Profile    DXA bone density spine hip and pelvis   Other Visit Diagnoses     Sicca syndrome (HCC)        Vitamin D insufficiency        Relevant Orders    Vitamin D 25 hydroxy              Reviewed records, labs, and imaging with the patient in detail.  Counseled patient.  Discussion regarding my findings and recommendations.  Office visit with documentation 35 min.    Subjective:      Patient ID: Clarissa Lizarraga is a 83 y.o. female.    Patient with  fibromyalgia/chronic complex pain syndrome, anxiety depression, lumbar and cervical spondylosis with history of lumbar decompression with chronic left footdrop, and ambulation difficulties, mild sensory neuropathy, with balance difficulties and history multiple falls. She had  been referred for physical therapy for balance training, particularly in view of her chronic neurogenic symptoms, however, she prefered to hold off. History hospital admission May 25th, 2022 for 4 days after a fall down 5 stairs. She was ultimately diagnosed after imaging with a C1 vertebral body fracture, C6 and C7 transverse process fractures, right clavicle fracture, right 1st rib fracture, and right scapular fracture.  She was seen by multiple services including Neurosurgery, Orthopedic surgery, and vascular surgery and non operative management was recommended.  There was a question of a mild right subclavian wall artery injury with possible mural thrombus, however vascular surgery recommended non operative management. It was suggested to go to rehab but she preferred to go home to care for her dog. She was ultimately was discharged to home  to have home physical therapy, and occupational therapy. She has chronic neck pain which can radiate up into her head. She notes persistent and slight worsening of short term memory issues.  She has intermittent sleep difficulties.  She continues to be able to live independently. 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    She forgot to go for lab work ordered for this office visit, but is requesting new orders which she will do in the next few days.  Review of lab work dated 11/3/2022 significant for hemoglobin A1c 5.4.  TSH 3.05.  CBC unremarkable.   Total cholesterol 227.  HDL 78.  Triglyceride 86.  .  Lab work dated 05/25/2022 significant for white blood cell count 11.08 after her fall.  Hemoglobin/hematocrit 12.2/38.7.  Glucose 157.  Estimated GFR 70.  Calcium 9.3.  Repeat CBC dated 05/26/2022 reveals normalized white blood cell count of 8.98.  Glucose 170.  Estimated GFR 79.  Calcium 9.0.  Lab work dated 03/01/2022 revealed hemoglobin A1c 5.4.  Total cholesterol 227.  HDL 76.  Triglyceride 75.  .  TSH normal.    DEXA dated 05/16/2022 significant for left total hip T-score-1.4.  Femoral neck -1.4.  Right total hip T-score-1.3 with femoral neck T-score -2.9.  Left forearm T-score-3.6.  FRAX risk for hip fracture 3.1% and major fracture risk 11.5%.        Allergies  Allergies   Allergen Reactions   • Betaine    • Cranberry Extract - Food Allergy    • Cranberry Juice Powder - Food Allergy    • Isoflavones (Soy)      Other reaction(s): Itching   • Latex      Other reaction(s): Rash and itching   • Soybean-Containing Drug Products - Food Allergy    • Voltaren [Diclofenac Sodium]        Home Medications    Current Outpatient Medications:   •  Aspirin 81 MG CAPS, Take by mouth, Disp: , Rfl:   •  Cholecalciferol (VITAMIN D3) 5000 units CAPS, Take 1,000 Units by mouth daily , Disp: , Rfl:   •  DULoxetine (CYMBALTA) 60 mg delayed release capsule, TAKE 1 CAPSULE BY MOUTH DAILY, Disp: 90 capsule, Rfl: 1  •  ezetimibe (ZETIA) 10 mg tablet, Take 10 mg by mouth daily., Disp: , Rfl:   •  Iron-Vit C-Vit B12-Folic Acid (IRON 100 PLUS PO), Take 100 mg by mouth in the morning, Disp: , Rfl:   •  levothyroxine 88 mcg tablet, TAKE 1 TABLET BY MOUTH  DAILY, Disp: 90 tablet, Rfl: 3  •  lisinopril (ZESTRIL) 5 mg tablet, Take 5 mg by mouth 2 (two) times a day, Disp: , Rfl: 0  •  Misc Natural Products (NEURIVA PO), Take by mouth, Disp: , Rfl:   •  Multiple Vitamins-Minerals (HAIR SKIN AND NAILS FORMULA PO), Take by mouth, Disp: , Rfl:   •  topiramate (TOPAMAX) 50 MG  tablet, TAKE 1 TABLET BY MOUTH  DAILY, Disp: 90 tablet, Rfl: 3  •  chlorhexidine (PERIDEX) 0.12 % solution, RINSE MOUTH WITH 15 ML (1 CAPFUL) FOR 30 SECONDS IN MORNING AND E...  (REFER TO PRESCRIPTION NOTES). (Patient not taking: Reported on 6/6/2023), Disp: , Rfl: 0  •  Menthol 5.4 MG LOZG, Take 1 lozenge every 2 hours as needed. (Patient not taking: Reported on 1/30/2024), Disp: , Rfl: 0  •  Multiple Vitamin (multivitamin) tablet, Take 1 tablet by mouth daily (Patient not taking: Reported on 1/30/2024), Disp: , Rfl:     Past Medical History  Past Medical History:   Diagnosis Date   • CTS (carpal tunnel syndrome)     unspecified laterality   • DVT (deep venous thrombosis) ([facility])     left leg, s/p IVC filter 11/2015   • Endometriosis    • Fall down stairs 05/26/2022   • Fibromyalgia    • Fibromyalgia, primary    • Foot drop, left foot    • Hypercholesteremia    • Hypertension    • Hypothyroidism    • Lyme disease     treated 8/2015   • Migraine    • Migraine    • Neurogenic claudication due to lumbar spinal stenosis    • Osteoarthritis    • PAF (paroxysmal atrial fibrillation) ([facility])     s/p ablation at [facility] (sees Dr Hastings at [facility])   • PAF (paroxysmal atrial fibrillation) ([facility]) 11/17/2015    Overview:  S/p AFib ablation.    • Peripheral neuropathy    • Raynaud's syndrome without gangrene    • Sjogren's syndrome ([facility])    • Solitary pulmonary nodule on lung CT        Past Surgical History   Past Surgical History:   Procedure Laterality Date   • ABDOMINAL HYSTERECTOMY     • APPENDECTOMY     • ATRIAL ABLATION SURGERY      cath   • BREAST SURGERY Left     puncture aspiration of cyst onset 1972   • CHOLECYSTECTOMY      onset 1981   • COLONOSCOPY      fiberoptic   • FOOT SURGERY Bilateral     onset 1993- removal of mortons neuroma   • HAND SURGERY      gaglion cyst removal   • HAND SURGERY      onset 1991 - carpal tunnel   • IVC FILTER INSERTION     • KNEE ARTHROSCOPY Left     therapeutic    • LYMPH NODE BIOPSY       1996 onset   • RI ARTHRODESIS POSTERIOR/PSTLAT TQ 1NTRSPC LUMBAR N/A 3/29/2016    Procedure: L2-S1 LAMINECTOMY;  Surgeon: Jaziel Livingston DO;  Location: [facility];  Service: Orthopedics   • TOTAL ABDOMINAL HYSTERECTOMY      onset 1989       Family History   Family History   Problem Relation Age of Onset   • Heart disease Father    • ADD / ADHD Father    • Stroke Father    • Cancer Brother         prostate   • Heart attack Maternal Grandmother         acute MI   • Cancer Family        The following portions of the patient's history were reviewed and updated as appropriate: allergies, current medications, past family history, past medical history, past social history, past surgical history, and problem list.    Review of Systems   Constitutional:  Positive for appetite change, diaphoresis and fatigue. Negative for chills and fever.        Chronic fatigue and poor appetite, unchanged.  Sweating likely related to Cymbalta, but not quite as significant.   passed in July.   HENT:  Positive for hearing loss and tinnitus. Negative for ear pain and sore throat.    Eyes:  Negative for pain and visual disturbance.        Chronic dry eyes on eye drops   Respiratory:  Negative for cough and shortness of breath.    Cardiovascular:  Negative for chest pain and palpitations.   Gastrointestinal:  Positive for constipation. Negative for abdominal pain and vomiting.   Genitourinary:  Positive for difficulty urinating and urgency. Negative for dysuria and hematuria.        No dysuria. Some incontinence at night.   Musculoskeletal:  Positive for back pain and gait problem. Negative for arthralgias.        Am gel min. Chronic back pain, non radicular. Chronic left foot drop. Left lower ext numbness. Raynauds without digital ulcers. Balance difficulties. Falls frequently. Prior hospital admission after a fall down 5 stairs May 25th, 2022, with multiple fractures. No joint swelling.  Chronic sicca symptoms.   Skin:  Negative for  "color change and rash.        Raynaud's mainly toes. No digital ulcers.   Neurological:  Positive for headaches. Negative for seizures and syncope.        No migraines on Topamax.  Numbness left lower extremity and questionably toes.   Psychiatric/Behavioral:          Anxiety.  Memory issues with aging   All other systems reviewed and are negative.        Objective:      /62 (BP Location: Left arm, Patient Position: Sitting, Cuff Size: Adult)   Pulse 73   Ht 5' 5\" (1.651 m)   Wt 67.6 kg (149 lb)   SpO2 98%   BMI 24.79 kg/m²          Physical Exam  Vitals reviewed.   Constitutional:       Appearance: Normal appearance.   HENT:      Head: Normocephalic.      Nose:      Comments: Nose and throat unremarkable  Eyes:      Extraocular Movements: Extraocular movements intact.   Neck:      Comments: Without masses, thyromegaly, lymphadenopathy  Cardiovascular:      Rate and Rhythm: Regular rhythm.   Pulmonary:      Breath sounds: Normal breath sounds.   Abdominal:      Palpations: Abdomen is soft.   Musculoskeletal:      Cervical back: Neck supple.      Comments: Neck decreased lateral flexion.  Triggers posterior cervical and shoulder girdle muscles.  Shoulders full range of motion left shoulder.  Right shoulder not examined due to sling which she is wearing for treatment of right clavicle fracture secondary to trauma.  Elbows full range of motion with triggers medial and lateral epicondyles.  Wrists full range of motion.  Tinel's negative bilaterally.  Hands squaring 1st carpometacarpal joints bilaterally with mild interphalangeal osteoarthritis.  No synovitis noted.  Straight leg raising negative bilaterally.  Spasm noted dorsal and lumbosacral paraspinals.  Hips full range of motion with triggers trochanteric bursa.  Knees slightly decreased flexion left knee with small cool effusion.  Triggers pes anserine bursa.  Ankles mild valgus deformities.  Feet no synovitis.   Skin:     General: Skin is warm. "   Neurological:      Comments: Left foot drop.               This note was written in part using the assistance of the [employer] [id number] microphone system. Those portions using this system have been dictated and not read.

## 2024-02-01 LAB
ALBUMIN SERPL-MCNC: 3.8 G/DL (ref 3.6–5.1)
ALBUMIN/GLOB SERPL: 1.7 (CALC) (ref 1–2.5)
ALP SERPL-CCNC: 69 U/L (ref 37–153)
ALT SERPL-CCNC: 9 U/L (ref 6–29)
AST SERPL-CCNC: 20 U/L (ref 10–35)
BASOPHILS # BLD AUTO: 40 CELLS/UL (ref 0–200)
BASOPHILS NFR BLD AUTO: 0.8 %
BILIRUB SERPL-MCNC: 0.4 MG/DL (ref 0.2–1.2)
BUN SERPL-MCNC: 26 MG/DL (ref 7–25)
BUN/CREAT SERPL: 35 (CALC) (ref 6–22)
CALCIUM SERPL-MCNC: 9.5 MG/DL (ref 8.6–10.4)
CHLORIDE SERPL-SCNC: 107 MMOL/L (ref 98–110)
CHOLEST SERPL-MCNC: 217 MG/DL
CHOLEST/HDLC SERPL: 2.7 (CALC)
CO2 SERPL-SCNC: 29 MMOL/L (ref 20–32)
CREAT SERPL-MCNC: 0.74 MG/DL (ref 0.6–0.95)
EOSINOPHIL # BLD AUTO: 310 CELLS/UL (ref 15–500)
EOSINOPHIL NFR BLD AUTO: 6.2 %
ERYTHROCYTE [DISTWIDTH] IN BLOOD BY AUTOMATED COUNT: 12.4 % (ref 11–15)
GFR/BSA.PRED SERPLBLD CYS-BASED-ARV: 79 ML/MIN/1.73M2
GLOBULIN SER CALC-MCNC: 2.2 G/DL (CALC) (ref 1.9–3.7)
GLUCOSE SERPL-MCNC: 83 MG/DL (ref 65–99)
HBA1C MFR BLD: 5.5 % OF TOTAL HGB
HCT VFR BLD AUTO: 40.2 % (ref 35–45)
HDLC SERPL-MCNC: 79 MG/DL
HGB BLD-MCNC: 13.4 G/DL (ref 11.7–15.5)
LDLC SERPL CALC-MCNC: 121 MG/DL (CALC)
LYMPHOCYTES # BLD AUTO: 985 CELLS/UL (ref 850–3900)
LYMPHOCYTES NFR BLD AUTO: 19.7 %
MCH RBC QN AUTO: 30.8 PG (ref 27–33)
MCHC RBC AUTO-ENTMCNC: 33.3 G/DL (ref 32–36)
MCV RBC AUTO: 92.4 FL (ref 80–100)
MONOCYTES # BLD AUTO: 565 CELLS/UL (ref 200–950)
MONOCYTES NFR BLD AUTO: 11.3 %
NEUTROPHILS # BLD AUTO: 3100 CELLS/UL (ref 1500–7800)
NEUTROPHILS NFR BLD AUTO: 62 %
NONHDLC SERPL-MCNC: 138 MG/DL (CALC)
PLATELET # BLD AUTO: 238 THOUSAND/UL (ref 140–400)
PMV BLD REES-ECKER: 10.4 FL (ref 7.5–12.5)
POTASSIUM SERPL-SCNC: 5 MMOL/L (ref 3.5–5.3)
PROT SERPL-MCNC: 6 G/DL (ref 6.1–8.1)
RBC # BLD AUTO: 4.35 MILLION/UL (ref 3.8–5.1)
SODIUM SERPL-SCNC: 140 MMOL/L (ref 135–146)
TRIGL SERPL-MCNC: 76 MG/DL
TSH SERPL-ACNC: 1.36 MIU/L (ref 0.4–4.5)
WBC # BLD AUTO: 5 THOUSAND/UL (ref 3.8–10.8)

## 2024-02-02 ENCOUNTER — TELEPHONE (OUTPATIENT)
Dept: FAMILY MEDICINE CLINIC | Facility: CLINIC | Age: 84
End: 2024-02-02

## 2024-02-02 LAB
25(OH)D3 SERPL-MCNC: 60 NG/ML (ref 30–100)
CRP SERPL-MCNC: 1.2 MG/L
ERYTHROCYTE [SEDIMENTATION RATE] IN BLOOD BY WESTERGREN METHOD: 2 MM/H

## 2024-02-02 NOTE — TELEPHONE ENCOUNTER
Left message for patient ----- Message from Gage Gonzalez MD sent at 2/2/2024  8:58 AM EST -----  Labs are normal except lipid panel slightly elevated. Continue current medications.

## 2024-02-04 NOTE — ASSESSMENT & PLAN NOTE
Chronic neurogenic symptoms with history of multiple falls.  She did have significant trauma with multiple fractures secondary to a fall down stairs in May 2022.  She does use a walker for ambulation assistance and remains comfortable in her home.  She is able to care for herself.  Encourage fall prevention.  She is not interested in home physical therapy at this time.  Continue to monitor.

## 2024-02-04 NOTE — ASSESSMENT & PLAN NOTE
Chronic left footdrop postop spine surgery.  She does have a MAFO brace which she rarely wears due to difficulty with the brace.  Continue to monitor.  Follow-up Neurology as scheduled.

## 2024-02-04 NOTE — ASSESSMENT & PLAN NOTE
Review of DEXA from May 2022 significant for osteoporosis right femoral neck and left forearm.  We did discuss treatment with Prolia every 6 months with lab work to obtain 2 weeks prior to each injection, as well as the need for consistent treatment in view of the potential for increased risk for vertebral fractures with abrupt discontinuation of Prolia.  Patient had been given a pamphlet on Prolia at the prior visit.  She is not interested in Prolia at this time, however, she will continue to think about it.  Encourage fall prevention particularly in view of her history of fall requiring hospitalization for multiple trauma.  Complete metabolic workup to rule out secondary causes of osteoporosis with lab work for the next office visit.  Follow-up 6 months or sooner if needed.  Will plan to repeat DEXA in May 2024 to monitor BMD.

## 2024-02-04 NOTE — ASSESSMENT & PLAN NOTE
Sicca symptoms treated symptomatically. Will plan to repeat   SAMMIE with labs ordered for next office visit. Continue to monitor.

## 2024-02-04 NOTE — ASSESSMENT & PLAN NOTE
Raynaud's with no history of digital ulcerations.  Continue cold protection.  Review of prior SAMMIE, double-stranded DNA, and complements normal or negative from March 2022.  Will plan to repeat serologies at the time of the next office visit.

## 2024-02-04 NOTE — ASSESSMENT & PLAN NOTE
Fibromyalgia generally stable.  Continue duloxetine.  Did discuss potential for physical therapy including home therapy, however, patient prefers to do her own home exercise program.  Encouraged her to practice fall prevention in view of her history of falls and osteoporosis.  Monitor labs for medication toxicity.  Follow-up 6 months or sooner if needed.

## 2024-02-09 ENCOUNTER — TELEPHONE (OUTPATIENT)
Age: 84
End: 2024-02-09

## 2024-02-09 NOTE — TELEPHONE ENCOUNTER
Incoming call:    Pt would like to make Dr. Castaneda aware that she is now willing to try Prolia.     Phone# [phone]

## 2024-03-01 ENCOUNTER — TELEPHONE (OUTPATIENT)
Age: 84
End: 2024-03-01

## 2024-03-01 NOTE — TELEPHONE ENCOUNTER
Called patient, voicemail is full and cannot leave message. Called and left voicemail for patient's son to call back to schedule his mom's Prolia

## 2024-03-01 NOTE — TELEPHONE ENCOUNTER
Patient called back asking to be scheduled in August for her Prolia while she is here for her office visit with [facility] due to transportation issues. Patient stated she is ok to wait til then

## 2024-04-12 NOTE — TELEPHONE ENCOUNTER
Pt states that she gets duloxetine from dr Narinder sanchez, rheum  She was requesting gabapentin  I entered refill for gabapentin and cancelled duloxetine    Pt states that she is out of medication 12-Apr-2024 10:23

## 2024-04-22 DIAGNOSIS — G43.909 MIGRAINE: ICD-10-CM

## 2024-04-23 RX ORDER — TOPIRAMATE 50 MG/1
TABLET, FILM COATED ORAL
Qty: 90 TABLET | Refills: 1 | Status: SHIPPED | OUTPATIENT
Start: 2024-04-23

## 2024-04-30 ENCOUNTER — RA CDI HCC (OUTPATIENT)
Dept: OTHER | Facility: HOSPITAL | Age: 84
End: 2024-04-30

## 2024-05-06 ENCOUNTER — OFFICE VISIT (OUTPATIENT)
Dept: FAMILY MEDICINE CLINIC | Facility: CLINIC | Age: 84
End: 2024-05-06
Payer: MEDICARE

## 2024-05-06 VITALS
WEIGHT: 157 LBS | HEART RATE: 58 BPM | OXYGEN SATURATION: 97 % | DIASTOLIC BLOOD PRESSURE: 80 MMHG | BODY MASS INDEX: 26.16 KG/M2 | TEMPERATURE: 97.7 F | SYSTOLIC BLOOD PRESSURE: 132 MMHG | RESPIRATION RATE: 18 BRPM | HEIGHT: 65 IN

## 2024-05-06 DIAGNOSIS — E78.5 HYPERLIPIDEMIA, UNSPECIFIED HYPERLIPIDEMIA TYPE: ICD-10-CM

## 2024-05-06 DIAGNOSIS — E03.9 HYPOTHYROIDISM, UNSPECIFIED TYPE: Primary | ICD-10-CM

## 2024-05-06 DIAGNOSIS — G43.009 MIGRAINE WITHOUT AURA AND WITHOUT STATUS MIGRAINOSUS, NOT INTRACTABLE: ICD-10-CM

## 2024-05-06 DIAGNOSIS — I83.90 VARICOSE VEINS OF ANKLE: ICD-10-CM

## 2024-05-06 DIAGNOSIS — R73.01 IMPAIRED FASTING GLUCOSE: ICD-10-CM

## 2024-05-06 DIAGNOSIS — M79.7 FIBROMYALGIA: ICD-10-CM

## 2024-05-06 DIAGNOSIS — I10 PRIMARY HYPERTENSION: Chronic | ICD-10-CM

## 2024-05-06 PROCEDURE — G2211 COMPLEX E/M VISIT ADD ON: HCPCS | Performed by: FAMILY MEDICINE

## 2024-05-06 PROCEDURE — 99214 OFFICE O/P EST MOD 30 MIN: CPT | Performed by: FAMILY MEDICINE

## 2024-05-06 NOTE — PROGRESS NOTES
Name: Clarissa Lizarraga      : 1940      MRN: [id number]  Encounter Provider: Gage Gonzalez MD  Encounter Date: 2024   Encounter department: [facility]  Chief Complaint   Patient presents with    Follow-up     6 month f/u      Health Maintenance   Topic Date Due    Depression Follow-up Plan  Never done    Zoster Vaccine (1 of 2) Never done    COVID-19 Vaccine (3 - 2023-24 season) 2023    Medicare Annual Wellness Visit (AWV)  10/18/2024    Falls: Plan of Care  10/18/2024    Fall Risk  2025    Depression Screening  2025    Urinary Incontinence Screening  2025    Pneumococcal Vaccine: 65+ Years  Completed    Influenza Vaccine  Completed    HIB Vaccine  Aged Out    IPV Vaccine  Aged Out    Hepatitis A Vaccine  Aged Out    Meningococcal ACWY Vaccine  Aged Out    HPV Vaccine  Aged Out       Assessment & Plan     1. Hypothyroidism, unspecified type  Assessment & Plan:  Continue levothyroxine 88mcg daily.     Orders:  -     TSH, 3rd generation with Free T4 reflex; Future; Expected date: 10/23/2024  -     TSH, 3rd generation with Free T4 reflex    2. Impaired fasting glucose  Assessment & Plan:  Low carb diet.       3. Fibromyalgia  Assessment & Plan:  Continue cymbalta 60mg QD per rheumatology.       4. Migraine without aura and without status migrainosus, not intractable  Assessment & Plan:  Continue topamax per neurology.       5. Primary hypertension  Assessment & Plan:  Continue lisinopril per cardiology.       6. Hyperlipidemia, unspecified hyperlipidemia type  Assessment & Plan:  Continue zetia per cardiology.       7. Varicose veins of ankle  Assessment & Plan:  Lump on right leg/ankle feel like varicose vein. If worse or painful, will do US.           Depression Screening and Follow-up Plan: Patient's depression screening was positive with a PHQ-9 score of 5. Clincally patient does not have depression. No treatment is required.     Falls Plan of Care: balance,  strength, and gait training instructions were provided.       Flu shot yearly.   Got Covid19 shots. Recommend booster.   Got Prevnar 10/2015. Got Pneumovax 2017.    Fall precautions.   Dexa 5/2022 T -3.6. FU rheumatology, plan to get prolia shot.   RTO in 6 months with labs.       Subjective      HPI    Pt is here by herself.       C/o right leg lump for 2 days.   No pain. No itchy.     Hypothyroidism---She is on levothyroxine 88 mcg daily. Feels fine. 2/2024 TSH normal.      IFG---Tried to eat healthy. 2/2024 hgA1C 5.5 controlled.     Fibromyalgia/lyme disease----Feels tired always. Always shoulders/back pain. FU rheumatology Dr. Castaneda for fibromyalgia/lyme disease/Raynaud's. She is on duloxetine 60mg pm which helped.   Osteoporosis---Suggest prolia per rheumatology. Need to do labs.       HTN---She is on lisinopril 5mg bid per cardiology.  Pt denies headache, vision change, SOB or CP.   FU cardiology Dr. Hastings for Afib s/p ablation 2006 yearly. Denies chest pain, palpitation etc. She is on ASA 81mg daily.      Hyperlipidemia---She refused to take statin because of fibromyalgia. She is on zetia 10mg daily per cardiology. Follows low fat diet.      FU neurology for headache/neuropathy. She is on topamax 50mg daily which helped a lot.       FU orthopedics for lumbar radiculopathy and lumbar spinal stenosis s/p L2-S2 laminectomy in 2016. Has balance problems. She uses cane/walker.      1 cat and 1 dog live with her. Son Thor lives next door to her.   Does all ADL's. Does not cook. Does not drive. Son helped her a lot.   No recent falls.   Felt down occasionally.       Review of Systems   Constitutional:  Negative for appetite change, chills and fever.   HENT:  Negative for congestion, ear pain, sinus pain and sore throat.    Eyes:  Negative for discharge and itching.   Respiratory:  Negative for apnea, cough, chest tightness, shortness of breath and wheezing.    Cardiovascular:  Negative for chest pain, palpitations  "and leg swelling.   Gastrointestinal:  Negative for abdominal pain, anal bleeding, constipation, diarrhea, nausea and vomiting.   Endocrine: Negative for cold intolerance, heat intolerance and polyuria.   Genitourinary:  Negative for difficulty urinating and dysuria.   Musculoskeletal:  Positive for arthralgias and gait problem. Negative for back pain and myalgias.   Skin:  Negative for rash.   Neurological:  Negative for dizziness and headaches.   Psychiatric/Behavioral:  Negative for agitation.        Current Outpatient Medications on File Prior to Visit   Medication Sig    Aspirin 81 MG CAPS Take by mouth    Cholecalciferol (VITAMIN D3) 5000 units CAPS Take 1,000 Units by mouth daily     DULoxetine (CYMBALTA) 60 mg delayed release capsule TAKE 1 CAPSULE BY MOUTH DAILY    ezetimibe (ZETIA) 10 mg tablet Take 10 mg by mouth daily.    levothyroxine 88 mcg tablet TAKE 1 TABLET BY MOUTH  DAILY    lisinopril (ZESTRIL) 5 mg tablet Take 5 mg by mouth 2 (two) times a day    Misc Natural Products (NEURIVA PO) Take by mouth    Multiple Vitamins-Iron (CHLORELLA PO) Take 500 mg by mouth in the morning 6 tablets daily    Multiple Vitamins-Minerals (HAIR SKIN AND NAILS FORMULA PO) Take by mouth    topiramate (TOPAMAX) 50 MG tablet TAKE 1 TABLET BY MOUTH DAILY    [DISCONTINUED] Iron-Vit C-Vit B12-Folic Acid (IRON 100 PLUS PO) Take 100 mg by mouth in the morning    chlorhexidine (PERIDEX) 0.12 % solution RINSE MOUTH WITH 15 ML (1 CAPFUL) FOR 30 SECONDS IN MORNING AND E...  (REFER TO PRESCRIPTION NOTES). (Patient not taking: Reported on 6/6/2023)    Menthol 5.4 MG LOZG Take 1 lozenge every 2 hours as needed. (Patient not taking: Reported on 1/30/2024)    [DISCONTINUED] Multiple Vitamin (multivitamin) tablet Take 1 tablet by mouth daily (Patient not taking: Reported on 1/30/2024)       Objective     /80   Pulse 58   Temp 97.7 °F (36.5 °C) (Temporal)   Resp 18   Ht 5' 5\" (1.651 m)   Wt 71.2 kg (157 lb)   SpO2 97%   BMI " 26.13 kg/m²     Physical Exam  Constitutional:       General: She is not in acute distress.     Appearance: She is well-developed.   HENT:      Head: Normocephalic.   Eyes:      General:         Right eye: No discharge.         Left eye: No discharge.      Conjunctiva/sclera: Conjunctivae normal.   Neck:      Thyroid: No thyromegaly.   Cardiovascular:      Rate and Rhythm: Normal rate and regular rhythm.      Heart sounds: Normal heart sounds. No murmur heard.     No friction rub. No gallop.   Pulmonary:      Effort: Pulmonary effort is normal. No respiratory distress.      Breath sounds: Normal breath sounds. No wheezing or rales.   Chest:      Chest wall: No tenderness.   Abdominal:      General: Bowel sounds are normal. There is no distension.      Palpations: Abdomen is soft. There is no mass.      Tenderness: There is no abdominal tenderness. There is no guarding or rebound.   Musculoskeletal:         General: No tenderness or deformity.      Cervical back: Normal range of motion.      Comments: Use walker   Lymphadenopathy:      Cervical: No cervical adenopathy.   Neurological:      Mental Status: She is alert.       Gage Gonzalez MD

## 2024-07-16 DIAGNOSIS — E03.9 HYPOTHYROIDISM, UNSPECIFIED TYPE: ICD-10-CM

## 2024-07-17 RX ORDER — LEVOTHYROXINE SODIUM 88 UG/1
88 TABLET ORAL DAILY
Qty: 100 TABLET | Refills: 1 | Status: SHIPPED | OUTPATIENT
Start: 2024-07-17

## 2024-07-22 ENCOUNTER — TELEPHONE (OUTPATIENT)
Age: 84
End: 2024-07-22

## 2024-07-23 ENCOUNTER — OFFICE VISIT (OUTPATIENT)
Dept: NEUROLOGY | Facility: CLINIC | Age: 84
End: 2024-07-23
Payer: MEDICARE

## 2024-07-23 VITALS
HEIGHT: 65 IN | BODY MASS INDEX: 26.66 KG/M2 | WEIGHT: 160 LBS | SYSTOLIC BLOOD PRESSURE: 138 MMHG | TEMPERATURE: 98.2 F | DIASTOLIC BLOOD PRESSURE: 78 MMHG | HEART RATE: 68 BPM | OXYGEN SATURATION: 98 %

## 2024-07-23 DIAGNOSIS — G60.9 PERIPHERAL NEUROPATHY, HEREDITARY/IDIOPATHIC: ICD-10-CM

## 2024-07-23 DIAGNOSIS — G43.109 MIGRAINE WITH AURA AND WITHOUT STATUS MIGRAINOSUS, NOT INTRACTABLE: Primary | ICD-10-CM

## 2024-07-23 PROCEDURE — 99214 OFFICE O/P EST MOD 30 MIN: CPT | Performed by: PHYSICIAN ASSISTANT

## 2024-07-23 NOTE — ASSESSMENT & PLAN NOTE
Pt reports frequent falls due to her legs being weak or not having sensation.  Discussed therapy but she does not believe that it will help.  She understands the risk of injury with frequent falls.

## 2024-07-23 NOTE — ASSESSMENT & PLAN NOTE
Pt reports no headaches since her lat appt.  She will continue topiramate 50mg daily.  I have spent a total time of 30 minutes in caring for this patient on the day of the visit/encounter including Diagnostic results, Prognosis, Risks and benefits of tx options, Instructions for management, Patient and family education, Importance of tx compliance, Risk factor reductions, Impressions, Counseling / Coordination of care, Documenting in the medical record, Reviewing / ordering tests, medicine, procedures  , and Obtaining or reviewing history  .  She will follow-up in 1 year.

## 2024-07-23 NOTE — PROGRESS NOTES
Ambulatory Visit  Name: Clarissa Lizarraga      : 1940      MRN: [id number]  Encounter Provider: Sowmya Lackey PA-C  Encounter Date: 2024   Encounter department: [facility] [place]    Assessment & Plan   1. Migraine with aura and without status migrainosus, not intractable  Assessment & Plan:  Pt reports no headaches since her lat appt.  She will continue topiramate 50mg daily.  I have spent a total time of 30 minutes in caring for this patient on the day of the visit/encounter including Diagnostic results, Prognosis, Risks and benefits of tx options, Instructions for management, Patient and family education, Importance of tx compliance, Risk factor reductions, Impressions, Counseling / Coordination of care, Documenting in the medical record, Reviewing / ordering tests, medicine, procedures  , and Obtaining or reviewing history  .  She will follow-up in 1 year.  2. Peripheral neuropathy, hereditary/idiopathic  Assessment & Plan:  Pt reports frequent falls due to her legs being weak or not having sensation.  Discussed therapy but she does not believe that it will help.  She understands the risk of injury with frequent falls.      History of Present Illness       Clarissa Lizarraga is an 85 yo woman, with fibromyalgia, HTN, hyperlipidemia, and lumbar spinal degenerative disease, who follows in the office due to peripheral neuropathy and migraine headaches. She does have a left foot drop stemming prior radiculopathy and does utilize an Afo brace. EMG of the lower extremities showed a polyneuropathy and a left l4/l5 radiculopathy in 2015. She did undergo a l2/S1 laminectomy and decompression in 2016  She continues to have a gait dysfunction and Lt foot drop. Her son helps her with appts as she does not drive. She lives alone and her son lives next door. She is not taking gabapentin due to side effects in the past. She is taking Cymbalta 60mg daily which helps to  "control the paresthesias. She reports more falls since her last appt. She denies any dizziness and states that she just loses her balance. She tends to fall backwards. She does not have a med alert system but tries to keep her cell phone close to her.     Overall her migraines are well controlled on Topamax 50 mg a day. She denies any migraine headaches since starting the medication.    1/4 patellar Lt. Achilles Lt 1/4    A 10 point review of systems was negative except for what is noted in the HPI.    Medical History Reviewed by provider this encounter:  Tobacco  Allergies  Meds  Problems  Med Hx  Surg Hx  Fam Hx       Objective     /78 (BP Location: Left arm, Patient Position: Sitting, Cuff Size: Adult)   Pulse 68   Temp 98.2 °F (36.8 °C) (Temporal)   Ht 5' 5\" (1.651 m)   Wt 72.6 kg (160 lb)   SpO2 98%   BMI 26.63 kg/m²     Physical Exam  Constitutional:       Appearance: Normal appearance.   HENT:      Head: Normocephalic and atraumatic.      Nose: Nose normal.      Mouth/Throat:      Mouth: Mucous membranes are moist.   Eyes:      Extraocular Movements: Extraocular movements intact.      Conjunctiva/sclera: Conjunctivae normal.      Pupils: Pupils are equal, round, and reactive to light.   Pulmonary:      Effort: Pulmonary effort is normal.   Neurological:      Mental Status: She is alert and oriented to person, place, and time.      Cranial Nerves: Cranial nerves 2-12 are intact.      Sensory: Sensory deficit present.      Motor: Weakness present.      Coordination: Coordination is intact.      Gait: Gait abnormal.      Deep Tendon Reflexes:      Reflex Scores:       Patellar reflexes are 1+ on the left side.       Achilles reflexes are 1+ on the left side.     Comments: Decreased to temperature and LT Lt LE.    Lt foot drop       Administrative Statements   I have spent a total time of 30 minutes in caring for this patient on the day of the visit/encounter including Diagnostic results, " Prognosis, Risks and benefits of tx options, Instructions for management, Patient and family education, Importance of tx compliance, Risk factor reductions, Impressions, Counseling / Coordination of care, Documenting in the medical record, Reviewing / ordering tests, medicine, procedures  , and Obtaining or reviewing history  .

## 2024-07-23 NOTE — PATIENT INSTRUCTIONS
Migraine with aura and without status migrainosus, not intractable  Pt reports no headaches since her lat appt.  She will continue topiramate 50mg daily.  I have spent a total time of 30 minutes in caring for this patient on the day of the visit/encounter including Diagnostic results, Prognosis, Risks and benefits of tx options, Instructions for management, Patient and family education, Importance of tx compliance, Risk factor reductions, Impressions, Counseling / Coordination of care, Documenting in the medical record, Reviewing / ordering tests, medicine, procedures  , and Obtaining or reviewing history  .  She will follow-up in 1 year.    Peripheral neuropathy, hereditary/idiopathic  Pt reports frequent falls due to her legs being weak or not having sensation.  Discussed therapy but she does not believe that it will help.  She understands the risk of injury with frequent falls.

## 2024-08-13 ENCOUNTER — OFFICE VISIT (OUTPATIENT)
Age: 84
End: 2024-08-13
Payer: MEDICARE

## 2024-08-13 VITALS
HEART RATE: 92 BPM | OXYGEN SATURATION: 97 % | WEIGHT: 142.2 LBS | SYSTOLIC BLOOD PRESSURE: 126 MMHG | DIASTOLIC BLOOD PRESSURE: 70 MMHG | BODY MASS INDEX: 23.69 KG/M2 | TEMPERATURE: 97.4 F | HEIGHT: 65 IN

## 2024-08-13 DIAGNOSIS — M79.7 FIBROMYALGIA: ICD-10-CM

## 2024-08-13 DIAGNOSIS — M21.372 LEFT FOOT DROP: ICD-10-CM

## 2024-08-13 DIAGNOSIS — M48.062 LUMBAR STENOSIS WITH NEUROGENIC CLAUDICATION: ICD-10-CM

## 2024-08-13 DIAGNOSIS — M81.0 SENILE OSTEOPOROSIS: ICD-10-CM

## 2024-08-13 DIAGNOSIS — E55.9 VITAMIN D INSUFFICIENCY: ICD-10-CM

## 2024-08-13 DIAGNOSIS — G89.4 CHRONIC PAIN SYNDROME: Primary | ICD-10-CM

## 2024-08-13 DIAGNOSIS — M35.00 SJOGREN'S SYNDROME WITHOUT EXTRAGLANDULAR INVOLVEMENT (HCC): ICD-10-CM

## 2024-08-13 DIAGNOSIS — E03.9 HYPOTHYROIDISM, UNSPECIFIED TYPE: ICD-10-CM

## 2024-08-13 DIAGNOSIS — Z79.899 ENCOUNTER FOR LONG-TERM (CURRENT) USE OF OTHER MEDICATIONS: ICD-10-CM

## 2024-08-13 PROCEDURE — 99214 OFFICE O/P EST MOD 30 MIN: CPT | Performed by: INTERNAL MEDICINE

## 2024-08-13 NOTE — PROGRESS NOTES
Assessment/Plan:    Senile osteoporosis  Review of prior DEXA from May 2022 was significant for osteoporosis right femoral neck and left forearm.  Patient had been referred for updated DEXA as well as metabolic evaluation, however, she forgot to go for lab work requested and never scheduled follow-up DEXA.  She was accompanied by a family friend at this visit, and I discussed my suggestions for evaluation and management of osteoporosis, however, I suggested that the patient discuss with the need for workup with labs and updated DEXA, clearance from the dentist prior to starting any osteoporosis medication, and importance of timely Prolia injections with lab work prior to each injection, with her son before committing to treatment.  My concern is that the patient will not be able to be compliant without having her son involved in keeping timely appointments, lab work, etc. I suggested to the patient and the family friend, that the son get back in touch with me to confirm that the patient will be able to be timely with lab work, injections, as well as get clearance from her local dentist, in order to proceed with treatment for osteoporosis.  Will await response from family before scheduling treatment.    Sjogren's syndrome without extraglandular involvement (HCC)  Sicca symptoms treated symptomatically. Will repeat   SAMMIE with labs ordered for next office visit. Continue to monitor.    Lumbar stenosis with neurogenic claudication  Chronic neurogenic symptoms with history of multiple falls.  She did have significant trauma with multiple fractures secondary to a fall down stairs in May 2022.  She uses a walker for ambulation assistance and remains comfortable in her home.  She is able to care for herself with some assistance from her son.  Encourage fall prevention.  She is not interested in home physical therapy at this time.  Continue to monitor.    Fibromyalgia  Continue duloxetine.  Monitor labs for medication  toxicity.    Chronic pain syndrome  Fibromyalgia overall stable with duloxetine.  Have discussed physical therapy including home therapy, however, patient prefers to do her own home exercise program.  Encouraged her to practice fall prevention in view of her history of falls and osteoporosis.  Monitor labs for medication toxicity.     Left foot drop  Chronic left footdrop postop spine surgery.  She does have a MAFO brace which she rarely wears due to difficulty with the brace.  Continue to monitor.  Follow-up Neurology as scheduled.     Hypothyroidism  Continue levothyroxine as ordered by primary care with routine monitoring of TSH by primary care.  Continue to monitor.         Problem List Items Addressed This Visit       Lumbar stenosis with neurogenic claudication     Chronic neurogenic symptoms with history of multiple falls.  She did have significant trauma with multiple fractures secondary to a fall down stairs in May 2022.  She uses a walker for ambulation assistance and remains comfortable in her home.  She is able to care for herself with some assistance from her son.  Encourage fall prevention.  She is not interested in home physical therapy at this time.  Continue to monitor.         Fibromyalgia     Continue duloxetine.  Monitor labs for medication toxicity.         Hypothyroidism     Continue levothyroxine as ordered by primary care with routine monitoring of TSH by primary care.  Continue to monitor.         Relevant Orders    TSH, 3rd generation    Left foot drop     Chronic left footdrop postop spine surgery.  She does have a MAFO brace which she rarely wears due to difficulty with the brace.  Continue to monitor.  Follow-up Neurology as scheduled.          Chronic pain syndrome - Primary     Fibromyalgia overall stable with duloxetine.  Have discussed physical therapy including home therapy, however, patient prefers to do her own home exercise program.  Encouraged her to practice fall prevention in  view of her history of falls and osteoporosis.  Monitor labs for medication toxicity.          Sjogren's syndrome without extraglandular involvement (HCC)     Sicca symptoms treated symptomatically. Will repeat   SAMMIE with labs ordered for next office visit. Continue to monitor.         Senile osteoporosis     Review of prior DEXA from May 2022 was significant for osteoporosis right femoral neck and left forearm.  Patient had been referred for updated DEXA as well as metabolic evaluation, however, she forgot to go for lab work requested and never scheduled follow-up DEXA.  She was accompanied by a family friend at this visit, and I discussed my suggestions for evaluation and management of osteoporosis, however, I suggested that the patient discuss with the need for workup with labs and updated DEXA, clearance from the dentist prior to starting any osteoporosis medication, and importance of timely Prolia injections with lab work prior to each injection, with her son before committing to treatment.  My concern is that the patient will not be able to be compliant without having her son involved in keeping timely appointments, lab work, etc. I suggested to the patient and the family friend, that the son get back in touch with me to confirm that the patient will be able to be timely with lab work, injections, as well as get clearance from her local dentist, in order to proceed with treatment for osteoporosis.  Will await response from family before scheduling treatment.         Relevant Orders    DXA bone density spine hip and pelvis    CBC and differential    C-reactive protein    Comprehensive metabolic panel    Protein electrophoresis, serum    Immunofixation IgA,IgG,IgM Qt.Immunofixation Serum Immunoglobulin    PTH, intact    TSH, 3rd generation    Celiac Antibodies Profile    Vitamin D 25 hydroxy    Urinalysis with microscopic    Calcium, urine, 24 hour     Other Visit Diagnoses       Encounter for long-term  (current) use of other medications        Relevant Orders    CBC and differential    C-reactive protein    Comprehensive metabolic panel    Vitamin D insufficiency                    Reviewed records, labs, and imaging with the patient in detail.  Counseled patient.  Discussion regarding my findings and recommendations.  Office visit with documentation 35 min.    Subjective:      Patient ID: Clarissa Lizarraga is a 84 y.o. female.    Patient with fibromyalgia/chronic complex pain syndrome, anxiety depression, lumbar and cervical spondylosis with history of lumbar decompression with chronic left footdrop, and ambulation difficulties, mild sensory neuropathy, with balance difficulties and history multiple falls. She had  been referred for physical therapy for balance training, particularly in view of her chronic neurogenic symptoms, however, she prefered to hold off. History hospital admission May 25th, 2022 for 4 days after a fall down 5 stairs. She was ultimately diagnosed after imaging with a C1 vertebral body fracture, C6 and C7 transverse process fractures, right clavicle fracture, right 1st rib fracture, and right scapular fracture.  She was seen by multiple services including Neurosurgery, Orthopedic surgery, and vascular surgery and non operative management was recommended.  There was a question of a mild right subclavian wall artery injury with possible mural thrombus, however vascular surgery recommended non operative management. It was suggested to go to rehab but she preferred to go home to care for her dog. She was ultimately was discharged to home  to have home physical therapy, and occupational therapy. She has chronic neck pain which can radiate up into her head. She notes persistent and slight worsening of short term memory issues.  She has intermittent sleep difficulties.  She continues to be able to live independently. 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    I had discussed treatment for her osteoporosis with history fractures, at the last office visit, and refer her for laboratory evaluation to rule out secondary causes of osteoporosis in addition to a DEXA, however, initially she deferred as she did not want to go on treatment with osteoporosis medications.  After the office visit she did call and request to go on Prolia, and she was told that she had to complete her lab work and DEXA prior to this office visit in addition to have dental clearance for osteoporosis medication, however, she did not go for lab work, DEXA scan, or obtain clearance from her dentist.    Review of prior lab work dated 11/3/2022 significant for hemoglobin A1c 5.4.  TSH 3.05.  CBC unremarkable.  Total cholesterol 227.  HDL 78.  Triglyceride 86.  .  Lab work dated 05/25/2022 significant for white blood cell count 11.08 after her fall.  Hemoglobin/hematocrit 12.2/38.7.  Glucose 157.  Estimated GFR 70.  Calcium 9.3.  Repeat CBC dated 05/26/2022 reveals normalized white blood cell count of 8.98.  Glucose 170.  Estimated GFR 79.  Calcium 9.0.  Lab work dated 03/01/2022 revealed hemoglobin A1c 5.4.  Total cholesterol 227.  HDL 76.  Triglyceride 75.  .  TSH normal.    DEXA dated 05/16/2022 significant for left total hip T-score-1.4.  Femoral neck -1.4.  Right total hip T-score-1.3 with femoral neck T-score -2.9.  Left forearm T-score-3.6.  FRAX risk for hip fracture 3.1% and major fracture risk 11.5%.        Allergies  Allergies   Allergen Reactions    Betaine     Cranberry Extract - Food Allergy     Cranberry Juice Powder - Food Allergy     Isoflavones (Soy)      Other reaction(s): Itching    Latex      Other reaction(s): Rash and itching    Soybean-Containing Drug  Products - Food Allergy     Voltaren [Diclofenac Sodium]        Home Medications    Current Outpatient Medications:     Cholecalciferol (VITAMIN D3) 5000 units CAPS, Take 1,000 Units by mouth daily , Disp: , Rfl:     DULoxetine (CYMBALTA) 60 mg delayed release capsule, TAKE 1 CAPSULE BY MOUTH DAILY, Disp: 90 capsule, Rfl: 1    ezetimibe (ZETIA) 10 mg tablet, Take 10 mg by mouth daily., Disp: , Rfl:     levothyroxine 88 mcg tablet, TAKE 1 TABLET BY MOUTH DAILY, Disp: 100 tablet, Rfl: 1    lisinopril (ZESTRIL) 5 mg tablet, Take 5 mg by mouth 2 (two) times a day, Disp: , Rfl: 0    Multiple Vitamins-Iron (CHLORELLA PO), Take 500 mg by mouth in the morning 6 tablets daily, Disp: , Rfl:     Multiple Vitamins-Minerals (HAIR SKIN AND NAILS FORMULA PO), Take by mouth, Disp: , Rfl:     topiramate (TOPAMAX) 50 MG tablet, TAKE 1 TABLET BY MOUTH DAILY, Disp: 90 tablet, Rfl: 1    chlorhexidine (PERIDEX) 0.12 % solution, RINSE MOUTH WITH 15 ML (1 CAPFUL) FOR 30 SECONDS IN MORNING AND E...  (REFER TO PRESCRIPTION NOTES). (Patient not taking: Reported on 6/6/2023), Disp: , Rfl: 0    Menthol 5.4 MG LOZG, Take 1 lozenge every 2 hours as needed. (Patient not taking: Reported on 1/30/2024), Disp: , Rfl: 0    Misc Natural Products (NEURIVA PO), Take by mouth (Patient not taking: Reported on 7/23/2024), Disp: , Rfl:     Past Medical History  Past Medical History:   Diagnosis Date    CTS (carpal tunnel syndrome)     unspecified laterality    DVT (deep venous thrombosis) ([facility])     left leg, s/p IVC filter 11/2015    Endometriosis     Fall down stairs 05/26/2022    Fibromyalgia     Fibromyalgia, primary     Foot drop, left foot     Hypercholesteremia     Hypertension     Hypothyroidism     Lyme disease     treated 8/2015    Migraine     Migraine     Neurogenic claudication due to lumbar spinal stenosis     Osteoarthritis     PAF (paroxysmal atrial fibrillation) ([facility])     s/p ablation at [facility] (sees Dr Hastings at [facility])    PAF (paroxysmal  atrial fibrillation) (HCC) 11/17/2015    Overview:  S/p AFib ablation.     Peripheral neuropathy     Raynaud's syndrome without gangrene     Sjogren's syndrome (HCC)     Solitary pulmonary nodule on lung CT        Past Surgical History   Past Surgical History:   Procedure Laterality Date    ABDOMINAL HYSTERECTOMY      APPENDECTOMY      ATRIAL ABLATION SURGERY      cath    BREAST SURGERY Left     puncture aspiration of cyst onset 1972    CHOLECYSTECTOMY      onset 1981    COLONOSCOPY      fiberoptic    FOOT SURGERY Bilateral     onset 1993- removal of mortons neuroma    HAND SURGERY      gaglion cyst removal    HAND SURGERY      onset 1991 - carpal tunnel    IVC FILTER INSERTION      KNEE ARTHROSCOPY Left     therapeutic     LYMPH NODE BIOPSY      1996 onset    TN ARTHRODESIS POSTERIOR/PSTLAT TQ 1NTRSPC LUMBAR N/A 3/29/2016    Procedure: L2-S1 LAMINECTOMY;  Surgeon: Jaziel Livingston DO;  Location: [facility];  Service: Orthopedics    TOTAL ABDOMINAL HYSTERECTOMY      onset 1989       Family History   Family History   Problem Relation Age of Onset    Heart disease Father     ADD / ADHD Father     Stroke Father     Cancer Brother         prostate    Heart attack Maternal Grandmother         acute MI    Cancer Family        The following portions of the patient's history were reviewed and updated as appropriate: allergies, current medications, past family history, past medical history, past social history, past surgical history, and problem list.    Review of Systems   Constitutional:  Positive for appetite change, diaphoresis and fatigue. Negative for chills and fever.        Chronic fatigue and poor appetite, unchanged.  Sweating likely related to Cymbalta, but not quite as significant.   passed in July.   HENT:  Positive for hearing loss and tinnitus. Negative for ear pain and sore throat.    Eyes:  Negative for pain and visual disturbance.        Chronic dry eyes on eye drops   Respiratory:  Negative for  "cough and shortness of breath.    Cardiovascular:  Negative for chest pain and palpitations.   Gastrointestinal:  Positive for constipation. Negative for abdominal pain and vomiting.   Genitourinary:  Positive for difficulty urinating and urgency. Negative for dysuria and hematuria.        No dysuria. Some incontinence at night.   Musculoskeletal:  Positive for back pain and gait problem. Negative for arthralgias.        Am gel min. Chronic back pain, non radicular. Chronic left foot drop. Left lower ext numbness. Raynauds without digital ulcers. Balance difficulties. Falls frequently. Prior hospital admission after a fall down 5 stairs May 25th, 2022, with multiple fractures. No joint swelling.  Chronic sicca symptoms.   Skin:  Negative for color change and rash.        Raynaud's mainly toes. No digital ulcers.   Neurological:  Positive for headaches. Negative for seizures and syncope.        No migraines on Topamax.  Numbness left lower extremity and questionably toes.   Psychiatric/Behavioral:          Anxiety.  Memory issues with aging   All other systems reviewed and are negative.        Objective:      /70 (BP Location: Left arm, Patient Position: Sitting, Cuff Size: Adult)   Pulse 92   Temp (!) 97.4 °F (36.3 °C) (Temporal)   Ht 5' 5\" (1.651 m)   Wt 64.5 kg (142 lb 3.2 oz)   SpO2 97%   BMI 23.66 kg/m²          Physical Exam  Vitals reviewed.   Constitutional:       Appearance: Normal appearance.   HENT:      Head: Normocephalic.      Nose:      Comments: Nose and throat unremarkable  Eyes:      Extraocular Movements: Extraocular movements intact.   Neck:      Comments: Without masses, thyromegaly, lymphadenopathy  Cardiovascular:      Rate and Rhythm: Regular rhythm.   Pulmonary:      Breath sounds: Normal breath sounds.   Abdominal:      Palpations: Abdomen is soft.   Musculoskeletal:      Cervical back: Neck supple.      Comments: Neck decreased lateral flexion.  Triggers posterior cervical and " shoulder girdle muscles.  Shoulders full range of motion left shoulder.  Right shoulder not examined due to sling which she is wearing for treatment of right clavicle fracture secondary to trauma.  Elbows full range of motion with triggers medial and lateral epicondyles.  Wrists full range of motion.  Tinel's negative bilaterally.  Hands squaring 1st carpometacarpal joints bilaterally with mild interphalangeal osteoarthritis.  No synovitis noted.  Straight leg raising negative bilaterally.  Spasm noted dorsal and lumbosacral paraspinals.  Hips full range of motion with triggers trochanteric bursa.  Knees slightly decreased flexion left knee with small cool effusion.  Triggers pes anserine bursa.  Ankles mild valgus deformities.  Feet no synovitis.   Skin:     General: Skin is warm.   Neurological:      Comments: Left foot drop.               This note was written in part using the assistance of the [employer] [id number] microphone system. Those portions using this system have been dictated and not read.

## 2024-08-13 NOTE — PATIENT INSTRUCTIONS
I would suggest that you meet with your friend, your son and herself and decide if you are willing to get lab work every 6 months 2 weeks before you would get the shot for the osteoporosis medication.  In addition you cannot be later than 4 weeks for the follow-up injection because missing a dose potentially increases your risk for a spine fracture.  Additionally I need clearance from your dentist before starting the medication and you should see the dentist every 6 months.  I am again giving you an order so that you get a DEXA scan.  That is only once every 2 years.  If you decide that all of these things are too much for you to do, then you would just continue with calcium and vitamin D on a daily basis and practice fall prevention.  If you want to move forward with this, call the office to set up the first injection and we will have our medical assistant give you the injection.  You will need to see me for that injection.  You will see me back at the time you have the second injection.

## 2024-08-15 NOTE — ASSESSMENT & PLAN NOTE
Sicca symptoms treated symptomatically. Will repeat   SAMMIE with labs ordered for next office visit. Continue to monitor.

## 2024-08-15 NOTE — ASSESSMENT & PLAN NOTE
Continue levothyroxine as ordered by primary care with routine monitoring of TSH by primary care.  Continue to monitor.

## 2024-08-15 NOTE — ASSESSMENT & PLAN NOTE
Review of prior DEXA from May 2022 was significant for osteoporosis right femoral neck and left forearm.  Patient had been referred for updated DEXA as well as metabolic evaluation, however, she forgot to go for lab work requested and never scheduled follow-up DEXA.  She was accompanied by a family friend at this visit, and I discussed my suggestions for evaluation and management of osteoporosis, however, I suggested that the patient discuss with the need for workup with labs and updated DEXA, clearance from the dentist prior to starting any osteoporosis medication, and importance of timely Prolia injections with lab work prior to each injection, with her son before committing to treatment.  My concern is that the patient will not be able to be compliant without having her son involved in keeping timely appointments, lab work, etc. I suggested to the patient and the family friend, that the son get back in touch with me to confirm that the patient will be able to be timely with lab work, injections, as well as get clearance from her local dentist, in order to proceed with treatment for osteoporosis.  Will await response from family before scheduling treatment.

## 2024-08-15 NOTE — ASSESSMENT & PLAN NOTE
Chronic neurogenic symptoms with history of multiple falls.  She did have significant trauma with multiple fractures secondary to a fall down stairs in May 2022.  She uses a walker for ambulation assistance and remains comfortable in her home.  She is able to care for herself with some assistance from her son.  Encourage fall prevention.  She is not interested in home physical therapy at this time.  Continue to monitor.

## 2024-08-15 NOTE — ASSESSMENT & PLAN NOTE
Fibromyalgia overall stable with duloxetine.  Have discussed physical therapy including home therapy, however, patient prefers to do her own home exercise program.  Encouraged her to practice fall prevention in view of her history of falls and osteoporosis.  Monitor labs for medication toxicity.

## 2024-09-18 DIAGNOSIS — G43.909 MIGRAINE: ICD-10-CM

## 2024-09-18 RX ORDER — TOPIRAMATE 50 MG/1
TABLET, FILM COATED ORAL
Qty: 90 TABLET | Refills: 1 | Status: SHIPPED | OUTPATIENT
Start: 2024-09-18

## 2024-10-09 LAB — TSH SERPL-ACNC: 1.83 MIU/L (ref 0.4–4.5)

## 2024-10-11 LAB
25(OH)D3 SERPL-MCNC: 58 NG/ML (ref 30–100)
ALBUMIN SERPL ELPH-MCNC: 4.1 G/DL (ref 3.8–4.8)
ALBUMIN SERPL-MCNC: 4 G/DL (ref 3.6–5.1)
ALBUMIN/GLOB SERPL: 1.6 (CALC) (ref 1–2.5)
ALP SERPL-CCNC: 75 U/L (ref 37–153)
ALPHA1 GLOB SERPL ELPH-MCNC: 0.3 G/DL (ref 0.2–0.3)
ALPHA2 GLOB SERPL ELPH-MCNC: 0.7 G/DL (ref 0.5–0.9)
ALT SERPL-CCNC: 9 U/L (ref 6–29)
APPEARANCE UR: CLEAR
AST SERPL-CCNC: 21 U/L (ref 10–35)
BACTERIA UR QL AUTO: ABNORMAL /HPF
BASOPHILS # BLD AUTO: 40 CELLS/UL (ref 0–200)
BASOPHILS NFR BLD AUTO: 0.7 %
BETA1 GLOB SERPL ELPH-MCNC: 0.4 G/DL (ref 0.4–0.6)
BETA2 GLOB SERPL ELPH-MCNC: 0.3 G/DL (ref 0.2–0.5)
BILIRUB SERPL-MCNC: 0.5 MG/DL (ref 0.2–1.2)
BILIRUB UR QL STRIP: NEGATIVE
BUN SERPL-MCNC: 24 MG/DL (ref 7–25)
BUN/CREAT SERPL: NORMAL (CALC) (ref 6–22)
CALCIUM SERPL-MCNC: 9.9 MG/DL (ref 8.6–10.4)
CHLORIDE SERPL-SCNC: 106 MMOL/L (ref 98–110)
CO2 SERPL-SCNC: 29 MMOL/L (ref 20–32)
COLOR UR: YELLOW
CREAT SERPL-MCNC: 0.75 MG/DL (ref 0.6–0.95)
CRP SERPL-MCNC: <3 MG/L
EOSINOPHIL # BLD AUTO: 359 CELLS/UL (ref 15–500)
EOSINOPHIL NFR BLD AUTO: 6.3 %
ERYTHROCYTE [DISTWIDTH] IN BLOOD BY AUTOMATED COUNT: 12.3 % (ref 11–15)
GAMMA GLOB SERPL ELPH-MCNC: 0.8 G/DL (ref 0.8–1.7)
GFR/BSA.PRED SERPLBLD CYS-BASED-ARV: 77 ML/MIN/1.73M2
GLOBULIN SER CALC-MCNC: 2.5 G/DL (CALC) (ref 1.9–3.7)
GLUCOSE SERPL-MCNC: 93 MG/DL (ref 65–99)
GLUCOSE UR QL STRIP: NEGATIVE
HCT VFR BLD AUTO: 43.1 % (ref 35–45)
HGB BLD-MCNC: 14 G/DL (ref 11.7–15.5)
HGB UR QL STRIP: NEGATIVE
HYALINE CASTS #/AREA URNS LPF: ABNORMAL /LPF
IGA SERPL-MCNC: 216 MG/DL (ref 70–320)
IGG SERPL-MCNC: 958 MG/DL (ref 600–1540)
IGM SERPL-MCNC: 89 MG/DL (ref 50–300)
KETONES UR QL STRIP: NEGATIVE
LEUKOCYTE ESTERASE UR QL STRIP: ABNORMAL
LYMPHOCYTES # BLD AUTO: 923 CELLS/UL (ref 850–3900)
LYMPHOCYTES NFR BLD AUTO: 16.2 %
MCH RBC QN AUTO: 31.1 PG (ref 27–33)
MCHC RBC AUTO-ENTMCNC: 32.5 G/DL (ref 32–36)
MCV RBC AUTO: 95.8 FL (ref 80–100)
MONOCYTES # BLD AUTO: 570 CELLS/UL (ref 200–950)
MONOCYTES NFR BLD AUTO: 10 %
NEUTROPHILS # BLD AUTO: 3808 CELLS/UL (ref 1500–7800)
NEUTROPHILS NFR BLD AUTO: 66.8 %
NITRITE UR QL STRIP: NEGATIVE
PH UR STRIP: 6 [PH] (ref 5–8)
PLATELET # BLD AUTO: 263 THOUSAND/UL (ref 140–400)
PMV BLD REES-ECKER: 10.3 FL (ref 7.5–12.5)
POTASSIUM SERPL-SCNC: 4.7 MMOL/L (ref 3.5–5.3)
PROT SERPL-MCNC: 6.5 G/DL (ref 6.1–8.1)
PROT SERPL-MCNC: 6.5 G/DL (ref 6.1–8.1)
PROT UR QL STRIP: NEGATIVE
PTH-INTACT SERPL-MCNC: 60 PG/ML (ref 16–77)
RBC # BLD AUTO: 4.5 MILLION/UL (ref 3.8–5.1)
RBC #/AREA URNS HPF: ABNORMAL /HPF
SODIUM SERPL-SCNC: 140 MMOL/L (ref 135–146)
SP GR UR STRIP: 1.02 (ref 1–1.03)
SQUAMOUS #/AREA URNS HPF: ABNORMAL /HPF
TSH SERPL-ACNC: 1.89 MIU/L (ref 0.4–4.5)
WBC # BLD AUTO: 5.7 THOUSAND/UL (ref 3.8–10.8)
WBC #/AREA URNS HPF: ABNORMAL /HPF

## 2024-10-17 LAB
CALCIUM 24H UR-MCNC: 272 MG/24 H (ref 35–250)
CALCIUM PRE 500 MG CA PO UR-SCNC: 22.7 MG/DL
SPECIMEN VOL 24H UR: 1200 ML

## 2024-10-25 ENCOUNTER — TELEPHONE (OUTPATIENT)
Age: 84
End: 2024-10-25

## 2024-11-04 ENCOUNTER — OFFICE VISIT (OUTPATIENT)
Dept: FAMILY MEDICINE CLINIC | Facility: CLINIC | Age: 84
End: 2024-11-04
Payer: MEDICARE

## 2024-11-04 VITALS
SYSTOLIC BLOOD PRESSURE: 140 MMHG | WEIGHT: 159 LBS | OXYGEN SATURATION: 98 % | RESPIRATION RATE: 18 BRPM | TEMPERATURE: 98 F | HEIGHT: 65 IN | DIASTOLIC BLOOD PRESSURE: 88 MMHG | BODY MASS INDEX: 26.49 KG/M2 | HEART RATE: 99 BPM

## 2024-11-04 DIAGNOSIS — M79.7 FIBROMYALGIA: ICD-10-CM

## 2024-11-04 DIAGNOSIS — E78.5 HYPERLIPIDEMIA, UNSPECIFIED HYPERLIPIDEMIA TYPE: ICD-10-CM

## 2024-11-04 DIAGNOSIS — I48.0 PAF (PAROXYSMAL ATRIAL FIBRILLATION) (HCC): ICD-10-CM

## 2024-11-04 DIAGNOSIS — I10 PRIMARY HYPERTENSION: Chronic | ICD-10-CM

## 2024-11-04 DIAGNOSIS — Z23 NEED FOR COVID-19 VACCINE: ICD-10-CM

## 2024-11-04 DIAGNOSIS — F33.9 DEPRESSION, RECURRENT (HCC): ICD-10-CM

## 2024-11-04 DIAGNOSIS — R73.01 IMPAIRED FASTING GLUCOSE: ICD-10-CM

## 2024-11-04 DIAGNOSIS — E03.9 HYPOTHYROIDISM, UNSPECIFIED TYPE: Primary | ICD-10-CM

## 2024-11-04 DIAGNOSIS — Z00.00 MEDICARE ANNUAL WELLNESS VISIT, SUBSEQUENT: ICD-10-CM

## 2024-11-04 DIAGNOSIS — Z23 ENCOUNTER FOR IMMUNIZATION: ICD-10-CM

## 2024-11-04 DIAGNOSIS — G43.109 MIGRAINE WITH AURA AND WITHOUT STATUS MIGRAINOSUS, NOT INTRACTABLE: ICD-10-CM

## 2024-11-04 DIAGNOSIS — M54.16 LUMBAR RADICULOPATHY: ICD-10-CM

## 2024-11-04 PROBLEM — S42.001A CLOSED FRACTURE OF RIGHT CLAVICLE: Status: RESOLVED | Noted: 2022-05-26 | Resolved: 2024-11-04

## 2024-11-04 PROBLEM — S12.9XXA CERVICAL SPINE FRACTURE (HCC): Status: RESOLVED | Noted: 2022-05-26 | Resolved: 2024-11-04

## 2024-11-04 PROBLEM — S25.109A: Status: RESOLVED | Noted: 2022-05-27 | Resolved: 2024-11-04

## 2024-11-04 PROBLEM — S22.31XA CLOSED FRACTURE OF ONE RIB OF RIGHT SIDE: Status: RESOLVED | Noted: 2022-05-26 | Resolved: 2024-11-04

## 2024-11-04 PROCEDURE — 91320 SARSCV2 VAC 30MCG TRS-SUC IM: CPT

## 2024-11-04 PROCEDURE — G0008 ADMIN INFLUENZA VIRUS VAC: HCPCS

## 2024-11-04 PROCEDURE — G0439 PPPS, SUBSEQ VISIT: HCPCS | Performed by: FAMILY MEDICINE

## 2024-11-04 PROCEDURE — 90480 ADMN SARSCOV2 VAC 1/ONLY CMP: CPT

## 2024-11-04 PROCEDURE — 90662 IIV NO PRSV INCREASED AG IM: CPT

## 2024-11-04 PROCEDURE — 99214 OFFICE O/P EST MOD 30 MIN: CPT | Performed by: FAMILY MEDICINE

## 2024-11-04 NOTE — ASSESSMENT & PLAN NOTE
Continue levothyroxine 88mcg daily.   Orders:    Comprehensive metabolic panel; Future    Hemoglobin A1C; Future    TSH, 3rd generation with Free T4 reflex; Future    Comprehensive metabolic panel    TSH, 3rd generation with Free T4 reflex

## 2024-11-04 NOTE — PROGRESS NOTES
Ambulatory Visit  Name: Clarissa Lizarraga      : 1940      MRN: [id number]  Encounter Provider: Gage Gonzalez MD  Encounter Date: 2024   Encounter department: [facility]    Chief Complaint   Patient presents with    Medicare Wellness Visit     Subsequent visit      Health Maintenance   Topic Date Due    Zoster Vaccine (1 of 2) Never done    RSV Vaccine Age 60+ Years (1 - 1-dose 60+ series) Never done    Influenza Vaccine (1) 2024    COVID-19 Vaccine (3 - - season) 2024    Medicare Annual Wellness Visit (AWV)  10/18/2024    Depression Screening  2025    Depression Follow-up Plan  2025    Fall Risk  2025    Urinary Incontinence Screening  2025    Pneumococcal Vaccine: 65+ Years  Completed    RSV Vaccine age 0-20 Months  Aged Out    HIB Vaccine  Aged Out    IPV Vaccine  Aged Out    Hepatitis A Vaccine  Aged Out    Meningococcal ACWY Vaccine  Aged Out    HPV Vaccine  Aged Out       Assessment & Plan  Hypothyroidism, unspecified type  Continue levothyroxine 88mcg daily.   Orders:    Comprehensive metabolic panel; Future    Hemoglobin A1C; Future    TSH, 3rd generation with Free T4 reflex; Future    Comprehensive metabolic panel    TSH, 3rd generation with Free T4 reflex    Impaired fasting glucose  Low carb diet.   Orders:    Comprehensive metabolic panel; Future    Hemoglobin A1C; Future    TSH, 3rd generation with Free T4 reflex; Future    Comprehensive metabolic panel    TSH, 3rd generation with Free T4 reflex    Fibromyalgia  Continue cymbalta 60mg QD per rheumatology.        Primary hypertension  DASH diet. Continue lisinopril 5mg QD per cardiology.   Orders:    Comprehensive metabolic panel; Future    Hemoglobin A1C; Future    TSH, 3rd generation with Free T4 reflex; Future    Comprehensive metabolic panel    TSH, 3rd generation with Free T4 reflex    PAF (paroxysmal atrial fibrillation) (HCC)  FU cardiology.        Hyperlipidemia,  unspecified hyperlipidemia type  Low fat diet. Continue zetia per cardiology.   Orders:    Comprehensive metabolic panel; Future    Hemoglobin A1C; Future    TSH, 3rd generation with Free T4 reflex; Future    Comprehensive metabolic panel    TSH, 3rd generation with Free T4 reflex    Migraine with aura and without status migrainosus, not intractable  Continue topamax per neurology.        Lumbar radiculopathy  FU pain specialist.        Depression, recurrent (HCC)  Controlled.          Encounter for immunization    Orders:    influenza vaccine, high-dose, PF 0.5 mL (Fluzone High Dose)    Need for COVID-19 vaccine    Orders:    COVID-19 Pfizer mRNA vaccine 12 yr and older (Comirnaty pre-filled syringe)    Medicare annual wellness visit, subsequent           Depression Screening and Follow-up Plan: Patient was screened for depression during today's encounter. They screened negative with a PHQ-9 score of 0.    Urinary Incontinence Plan of Care: counseling topics discussed: practice Kegel (pelvic floor strengthening) exercises, use restroom every 2 hours, limit alcohol, caffeine, spicy foods, and acidic foods, keeping a bladder diary, limiting fluid intake 3-4 hours before bed, weight loss and preventing constipation.       Reviewed lab in 10/2024  TSH normal  Lipid 249/79/67/164   CMP ok  CBC normal    Flu shot yearly. Give flu shot today.   Got Covid19 shots. Give booster today.   Got Prevnar 10/2015. Got Pneumovax 2017.    Fall precautions.   Dexa 5/2022 T -3.6. FU rheumatology, plan to get prolia shot after dental work.    RTO in 6 months with labs.     POA---son  Living will ---DNR if end of life      Preventive health issues were discussed with patient, and age appropriate screening tests were ordered as noted in patient's After Visit Summary. Personalized health advice and appropriate referrals for health education or preventive services given if needed, as noted in patient's After Visit Summary.    History of  Present Illness     HPI     Pt is here by herself.        Hypothyroidism---She is on levothyroxine 88 mcg daily. Feels ok.      IFG---Tried to eat healthy.      Fibromyalgia/lyme disease----FU rheumatology Dr. Castaneda for fibromyalgia/lyme disease/Raynaud's. She is on duloxetine 60mg pm which helped.   Osteoporosis---Suggest prolia per rheumatology.       HTN/PAF---She is on lisinopril 5mg bid per cardiology.  Pt denies headache, vision change, SOB or CP.   FU cardiology Dr. Hastings for Afib s/p ablation 2006 yearly. Denies chest pain, palpitation etc. She is on ASA 81mg daily.      Hyperlipidemia---She refused to take statin because of fibromyalgia. She is on zetia 10mg daily per cardiology. Follows low fat diet.      FU neurology for headache/neuropathy. She is on topamax 50mg daily which helped a lot.       FU orthopedics for lumbar radiculopathy and lumbar spinal stenosis s/p L2-S2 laminectomy in 2016. Has balance problems. She uses cane/walker.      1 cat and 1 dog live with her. Son Thor lives next door to her.   Does all ADL's. Does not cook. Does not drive. Son helped her a lot.   No recent falls.   Denies depression.          Patient Care Team:  Gage Gonzalez MD as PCP - General  Jaziel Livingston, MD Yue Marsh MD Farooq Qureshi, MD William Delong, MD (Inactive)  MD Rudy Flores DO Malek Georges Numeir, MD    Review of Systems   Constitutional:  Negative for appetite change, chills and fever.   HENT:  Negative for congestion, ear pain, sinus pain and sore throat.    Eyes:  Negative for discharge and itching.   Respiratory:  Negative for apnea, cough, chest tightness, shortness of breath and wheezing.    Cardiovascular:  Negative for chest pain, palpitations and leg swelling.   Gastrointestinal:  Negative for abdominal pain, anal bleeding, constipation, diarrhea, nausea and vomiting.   Endocrine: Negative for cold intolerance, heat intolerance and polyuria.   Genitourinary:   Negative for difficulty urinating and dysuria.   Musculoskeletal:  Positive for arthralgias and gait problem. Negative for back pain and myalgias.   Skin:  Negative for rash.   Neurological:  Negative for dizziness and headaches.   Psychiatric/Behavioral:  Negative for agitation.      Medical History Reviewed by provider this encounter:  Tobacco  Problems  Med Hx  Surg Hx       Annual Wellness Visit Questionnaire   Clarissa is here for her Subsequent Wellness visit.     Health Risk Assessment:   Patient rates overall health as good. Patient feels that their physical health rating is same. Patient is satisfied with their life. Eyesight was rated as same. Hearing was rated as same. Patient feels that their emotional and mental health rating is same. Patients states they are never, rarely angry. Patient states they are never, rarely unusually tired/fatigued. Pain experienced in the last 7 days has been some. Patient's pain rating has been 8/10. Patient states that she has experienced weight loss or gain in last 6 months.     Depression Screening:   PHQ-9 Score: 0      Fall Risk Screening:   In the past year, patient has experienced: no history of falling in past year      Urinary Incontinence Screening:   Patient has leaked urine accidently in the last six months.     Home Safety:  Patient does not have trouble with stairs inside or outside of their home. Patient has working smoke alarms and has working carbon monoxide detector. Home safety hazards include: none.     Nutrition:   Current diet is Regular.     Medications:   Patient is not currently taking any over-the-counter supplements. Patient is able to manage medications.     Activities of Daily Living (ADLs)/Instrumental Activities of Daily Living (IADLs):   Walk and transfer into and out of bed and chair?: Yes  Dress and groom yourself?: Yes    Bathe or shower yourself?: Yes    Feed yourself? Yes  Do your laundry/housekeeping?: Yes  Manage your money, pay your  bills and track your expenses?: Yes  Make your own meals?: No    Do your own shopping?: No    Previous Hospitalizations:   Any hospitalizations or ED visits within the last 12 months?: No      Advance Care Planning:   Living will: Yes    Durable POA for healthcare: Yes    Advanced directive: Yes    End of Life Decisions reviewed with patient: Yes    Provider agrees with end of life decisions: Yes      Cognitive Screening:   Provider or family/friend/caregiver concerned regarding cognition?: Yes    PREVENTIVE SCREENINGS      Cardiovascular Screening:    General: Screening Not Indicated, History Lipid Disorder and Screening Current      Diabetes Screening:     General: Screening Current      Colorectal Cancer Screening:     General: Screening Not Indicated      Breast Cancer Screening:     General: Screening Not Indicated      Cervical Cancer Screening:    General: Screening Not Indicated      Osteoporosis Screening:    General: Screening Not Indicated and History Osteoporosis      Lung Cancer Screening:     General: Screening Not Indicated    Screening, Brief Intervention, and Referral to Treatment (SBIRT)    Screening  Typical number of drinks in a day: 0  Typical number of drinks in a week: 0  Interpretation: Low risk drinking behavior.    Single Item Drug Screening:  How often have you used an illegal drug (including marijuana) or a prescription medication for non-medical reasons in the past year? never    Single Item Drug Screen Score: 0  Interpretation: Negative screen for possible drug use disorder    Social Determinants of Health     Food Insecurity: No Food Insecurity (11/4/2024)    Hunger Vital Sign     Worried About Running Out of Food in the Last Year: Never true     Ran Out of Food in the Last Year: Never true   Transportation Needs: No Transportation Needs (11/4/2024)    PRAPARE - Transportation     Lack of Transportation (Medical): No     Lack of Transportation (Non-Medical): No   Housing Stability:  "Unknown (11/4/2024)    Housing Stability Vital Sign     Unable to Pay for Housing in the Last Year: No     Homeless in the Last Year: No   Utilities: Not At Risk (11/4/2024)    [facility] Utilities     Threatened with loss of utilities: No     No results found.    Objective     /88   Pulse 99   Temp 98 °F (36.7 °C) (Tympanic)   Resp 18   Ht 5' 5\" (1.651 m)   Wt 58 kg (127 lb 12.8 oz)   SpO2 98%   BMI 21.27 kg/m²     Physical Exam  Vitals reviewed.   Constitutional:       Appearance: Normal appearance.   HENT:      Head: Normocephalic and atraumatic.   Eyes:      General:         Right eye: No discharge.         Left eye: No discharge.      Conjunctiva/sclera: Conjunctivae normal.   Neck:      Vascular: No carotid bruit.   Cardiovascular:      Rate and Rhythm: Normal rate and regular rhythm.      Heart sounds: Normal heart sounds. No murmur heard.     No friction rub. No gallop.   Pulmonary:      Effort: Pulmonary effort is normal. No respiratory distress.      Breath sounds: Normal breath sounds. No wheezing or rales.   Abdominal:      General: Bowel sounds are normal. There is no distension.      Palpations: Abdomen is soft.      Tenderness: There is no abdominal tenderness.   Musculoskeletal:         General: No swelling, tenderness or deformity.      Cervical back: Normal range of motion and neck supple. No muscular tenderness.      Comments: Use walker   Lymphadenopathy:      Cervical: No cervical adenopathy.   Neurological:      Mental Status: She is alert.   Psychiatric:         Mood and Affect: Mood normal.         "

## 2024-11-04 NOTE — ASSESSMENT & PLAN NOTE
Low fat diet. Continue zetia per cardiology.   Orders:    Comprehensive metabolic panel; Future    Hemoglobin A1C; Future    TSH, 3rd generation with Free T4 reflex; Future    Comprehensive metabolic panel    TSH, 3rd generation with Free T4 reflex

## 2024-11-04 NOTE — ASSESSMENT & PLAN NOTE
DASH diet. Continue lisinopril 5mg QD per cardiology.   Orders:    Comprehensive metabolic panel; Future    Hemoglobin A1C; Future    TSH, 3rd generation with Free T4 reflex; Future    Comprehensive metabolic panel    TSH, 3rd generation with Free T4 reflex

## 2024-11-04 NOTE — ASSESSMENT & PLAN NOTE
Low carb diet.   Orders:    Comprehensive metabolic panel; Future    Hemoglobin A1C; Future    TSH, 3rd generation with Free T4 reflex; Future    Comprehensive metabolic panel    TSH, 3rd generation with Free T4 reflex

## 2024-11-04 NOTE — PATIENT INSTRUCTIONS
Medicare Preventive Visit Patient Instructions  Thank you for completing your Welcome to Medicare Visit or Medicare Annual Wellness Visit today. Your next wellness visit will be due in one year (11/5/2025).  The screening/preventive services that you may require over the next 5-10 years are detailed below. Some tests may not apply to you based off risk factors and/or age. Screening tests ordered at today's visit but not completed yet may show as past due. Also, please note that scanned in results may not display below.  Preventive Screenings:  Service Recommendations Previous Testing/Comments   Colorectal Cancer Screening  * Colonoscopy    * Fecal Occult Blood Test (FOBT)/Fecal Immunochemical Test (FIT)  * Fecal DNA/Cologuard Test  * Flexible Sigmoidoscopy Age: 45-75 years old   Colonoscopy: every 10 years (may be performed more frequently if at higher risk)  OR  FOBT/FIT: every 1 year  OR  Cologuard: every 3 years  OR  Sigmoidoscopy: every 5 years  Screening may be recommended earlier than age 45 if at higher risk for colorectal cancer. Also, an individualized decision between you and your healthcare provider will decide whether screening between the ages of 76-85 would be appropriate. Colonoscopy: Not on file  FOBT/FIT: Not on file  Cologuard: Not on file  Sigmoidoscopy: Not on file          Breast Cancer Screening Age: 40+ years old  Frequency: every 1-2 years  Not required if history of left and right mastectomy Mammogram: Not on file        Cervical Cancer Screening Between the ages of 21-29, pap smear recommended once every 3 years.   Between the ages of 30-65, can perform pap smear with HPV co-testing every 5 years.   Recommendations may differ for women with a history of total hysterectomy, cervical cancer, or abnormal pap smears in past. Pap Smear: Not on file        Hepatitis C Screening Once for adults born between 1945 and 1965  More frequently in patients at high risk for Hepatitis C Hep C Antibody: Not  on file        Diabetes Screening 1-2 times per year if you're at risk for diabetes or have pre-diabetes Fasting glucose: No results in last 5 years (No results in last 5 years)  A1C: 5.5 % of total Hgb (2/1/2024)      Cholesterol Screening Once every 5 years if you don't have a lipid disorder. May order more often based on risk factors. Lipid panel: 10/08/2024          Other Preventive Screenings Covered by Medicare:  Abdominal Aortic Aneurysm (AAA) Screening: covered once if your at risk. You're considered to be at risk if you have a family history of AAA.  Lung Cancer Screening: covers low dose CT scan once per year if you meet all of the following conditions: (1) Age 55-77; (2) No signs or symptoms of lung cancer; (3) Current smoker or have quit smoking within the last 15 years; (4) You have a tobacco smoking history of at least 20 pack years (packs per day multiplied by number of years you smoked); (5) You get a written order from a healthcare provider.  Glaucoma Screening: covered annually if you're considered high risk: (1) You have diabetes OR (2) Family history of glaucoma OR (3)  aged 50 and older OR (4)  American aged 65 and older  Osteoporosis Screening: covered every 2 years if you meet one of the following conditions: (1) You're estrogen deficient and at risk for osteoporosis based off medical history and other findings; (2) Have a vertebral abnormality; (3) On glucocorticoid therapy for more than 3 months; (4) Have primary hyperparathyroidism; (5) On osteoporosis medications and need to assess response to drug therapy.   Last bone density test (DXA Scan): 05/16/2022.  HIV Screening: covered annually if you're between the age of 15-65. Also covered annually if you are younger than 15 and older than 65 with risk factors for HIV infection. For pregnant patients, it is covered up to 3 times per pregnancy.    Immunizations:  Immunization Recommendations   Influenza Vaccine Annual  influenza vaccination during flu season is recommended for all persons aged >= 6 months who do not have contraindications   Pneumococcal Vaccine   * Pneumococcal conjugate vaccine = PCV13 (Prevnar 13), PCV15 (Vaxneuvance), PCV20 (Prevnar 20)  * Pneumococcal polysaccharide vaccine = PPSV23 (Pneumovax) Adults 19-63 yo with certain risk factors or if 65+ yo  If never received any pneumonia vaccine: recommend Prevnar 20 (PCV20)  Give PCV20 if previously received 1 dose of PCV13 or PPSV23   Hepatitis B Vaccine 3 dose series if at intermediate or high risk (ex: diabetes, end stage renal disease, liver disease)   Respiratory syncytial virus (RSV) Vaccine - COVERED BY MEDICARE PART D  * RSVPreF3 (Arexvy) CDC recommends that adults 60 years of age and older may receive a single dose of RSV vaccine using shared clinical decision-making (SCDM)   Tetanus (Td) Vaccine - COST NOT COVERED BY MEDICARE PART B Following completion of primary series, a booster dose should be given every 10 years to maintain immunity against tetanus. Td may also be given as tetanus wound prophylaxis.   Tdap Vaccine - COST NOT COVERED BY MEDICARE PART B Recommended at least once for all adults. For pregnant patients, recommended with each pregnancy.   Shingles Vaccine (Shingrix) - COST NOT COVERED BY MEDICARE PART B  2 shot series recommended in those 19 years and older who have or will have weakened immune systems or those 50 years and older     Health Maintenance Due:  There are no preventive care reminders to display for this patient.  Immunizations Due:      Topic Date Due   • Influenza Vaccine (1) 09/01/2024   • COVID-19 Vaccine (3 - 2023-24 season) 09/01/2024     Advance Directives   What are advance directives?  Advance directives are legal documents that state your wishes and plans for medical care. These plans are made ahead of time in case you lose your ability to make decisions for yourself. Advance directives can apply to any medical  decision, such as the treatments you want, and if you want to donate organs.   What are the types of advance directives?  There are many types of advance directives, and each state has rules about how to use them. You may choose a combination of any of the following:  Living will:  This is a written record of the treatment you want. You can also choose which treatments you do not want, which to limit, and which to stop at a certain time. This includes surgery, medicine, IV fluid, and tube feedings.   Durable power of  for healthcare (DPAHC):  This is a written record that states who you want to make healthcare choices for you when you are unable to make them for yourself. This person, called a proxy, is usually a family member or a friend. You may choose more than 1 proxy.  Do not resuscitate (DNR) order:  A DNR order is used in case your heart stops beating or you stop breathing. It is a request not to have certain forms of treatment, such as CPR. A DNR order may be included in other types of advance directives.  Medical directive:  This covers the care that you want if you are in a coma, near death, or unable to make decisions for yourself. You can list the treatments you want for each condition. Treatment may include pain medicine, surgery, blood transfusions, dialysis, IV or tube feedings, and a ventilator (breathing machine).  Values history:  This document has questions about your views, beliefs, and how you feel and think about life. This information can help others choose the care that you would choose.  Why are advance directives important?  An advance directive helps you control your care. Although spoken wishes may be used, it is better to have your wishes written down. Spoken wishes can be misunderstood, or not followed. Treatments may be given even if you do not want them. An advance directive may make it easier for your family to make difficult choices about your care.       © Copyright [employer] 2018 Information is for End User's use only and may not be sold, redistributed or otherwise used for commercial purposes. All illustrations and images included in CareNotes® are the copyrighted property of [employer].D.A[employer]M., Inc[employer] or [employer]       Admission

## 2024-12-25 DIAGNOSIS — E03.9 HYPOTHYROIDISM, UNSPECIFIED TYPE: ICD-10-CM

## 2024-12-26 RX ORDER — LEVOTHYROXINE SODIUM 88 UG/1
88 TABLET ORAL DAILY
Qty: 90 TABLET | Refills: 1 | Status: SHIPPED | OUTPATIENT
Start: 2024-12-26

## 2025-02-18 DIAGNOSIS — G43.909 MIGRAINE: ICD-10-CM

## 2025-02-19 RX ORDER — TOPIRAMATE 50 MG/1
50 TABLET, FILM COATED ORAL DAILY
Qty: 90 TABLET | Refills: 1 | Status: SHIPPED | OUTPATIENT
Start: 2025-02-19

## 2025-05-05 ENCOUNTER — OFFICE VISIT (OUTPATIENT)
Dept: FAMILY MEDICINE CLINIC | Facility: CLINIC | Age: 85
End: 2025-05-05
Payer: MEDICARE

## 2025-05-05 VITALS
WEIGHT: 159.6 LBS | HEART RATE: 100 BPM | TEMPERATURE: 97.1 F | SYSTOLIC BLOOD PRESSURE: 122 MMHG | HEIGHT: 65 IN | RESPIRATION RATE: 18 BRPM | BODY MASS INDEX: 26.59 KG/M2 | OXYGEN SATURATION: 95 % | DIASTOLIC BLOOD PRESSURE: 84 MMHG

## 2025-05-05 DIAGNOSIS — G44.89 OTHER HEADACHE SYNDROME: ICD-10-CM

## 2025-05-05 DIAGNOSIS — I10 PRIMARY HYPERTENSION: Chronic | ICD-10-CM

## 2025-05-05 DIAGNOSIS — I48.0 PAF (PAROXYSMAL ATRIAL FIBRILLATION) (HCC): ICD-10-CM

## 2025-05-05 DIAGNOSIS — E03.9 HYPOTHYROIDISM, UNSPECIFIED TYPE: ICD-10-CM

## 2025-05-05 DIAGNOSIS — M79.7 FIBROMYALGIA: ICD-10-CM

## 2025-05-05 DIAGNOSIS — D22.9 CHANGE IN MOLE: Primary | ICD-10-CM

## 2025-05-05 DIAGNOSIS — R73.01 IMPAIRED FASTING GLUCOSE: ICD-10-CM

## 2025-05-05 DIAGNOSIS — E78.5 HYPERLIPIDEMIA, UNSPECIFIED HYPERLIPIDEMIA TYPE: ICD-10-CM

## 2025-05-05 DIAGNOSIS — Z23 ENCOUNTER FOR IMMUNIZATION: ICD-10-CM

## 2025-05-05 PROCEDURE — 90677 PCV20 VACCINE IM: CPT

## 2025-05-05 PROCEDURE — G2211 COMPLEX E/M VISIT ADD ON: HCPCS | Performed by: FAMILY MEDICINE

## 2025-05-05 PROCEDURE — 99214 OFFICE O/P EST MOD 30 MIN: CPT | Performed by: FAMILY MEDICINE

## 2025-05-05 PROCEDURE — G0009 ADMIN PNEUMOCOCCAL VACCINE: HCPCS

## 2025-05-05 NOTE — PROGRESS NOTES
Name: Clarissa Lizarraga      : 1940      MRN: [id number]  Encounter Provider: Gage Gonzalez MD  Encounter Date: 2025   Encounter department: [facility]  :  Assessment & Plan  Change in mole  Right upper back mole for a while. It bothers her recently. Itchy. No pain.   Refer to dermatology.   Orders:    Ambulatory Referral to Dermatology; Future    Hypothyroidism, unspecified type  Continue levothyroxine 88mcg daily.   Orders:    Comprehensive metabolic panel; Future    Hemoglobin A1C With EAG; Future    TSH, 3rd generation with Free T4 reflex; Future    Impaired fasting glucose  Low carb diet.   Orders:    Comprehensive metabolic panel; Future    Hemoglobin A1C With EAG; Future    TSH, 3rd generation with Free T4 reflex; Future    Primary hypertension  Continue lisinopril 5mg QD per cardiology.   Orders:    Comprehensive metabolic panel; Future    Hemoglobin A1C With EAG; Future    TSH, 3rd generation with Free T4 reflex; Future    Hyperlipidemia, unspecified hyperlipidemia type  Continue zetia 10mg qhs per cardiology.   Orders:    Comprehensive metabolic panel; Future    Hemoglobin A1C With EAG; Future    TSH, 3rd generation with Free T4 reflex; Future    Other headache syndrome  Continue topamax per neurology.        Fibromyalgia  Continue cymbalta 60mg QD per rheumatology.        PAF (paroxysmal atrial fibrillation) (HCC)  Continue ASA per cardiology.        Encounter for immunization    Orders:    Pneumococcal Conjugate Vaccine 20-valent (Pcv20)      Flu shot yearly.  Got Covid19 shots and boosters.   Give [id number] today.    Fall precautions.   Dexa 2022 T -3.6. FU rheumatology.   RTO in 6 months.          History of Present Illness   HPI    Pt is here by herself. Son waits outside.        Hypothyroidism---She is on levothyroxine 88 mcg daily. Feels ok.      IFG---Tried to eat healthy.      Fibromyalgia/lyme disease----FU rheumatology Dr. Castaneda for fibromyalgia/lyme  "disease/Raynaud's. She is on duloxetine 60mg pm which helped.   Osteoporosis---Suggest prolia per rheumatology. Not start yet per pt.       HTN/PAF---She is on lisinopril 5mg bid per cardiology.  Pt denies headache, vision change, SOB or CP.   FU cardiology Dr. Hastings for Afib s/p ablation 2006 yearly. Denies chest pain, palpitation etc. She is on ASA 81mg daily.      Hyperlipidemia---She refused to take statin because of fibromyalgia. She is on zetia 10mg daily per cardiology. Follows low fat diet.      FU neurology for headache/neuropathy. She is on topamax 50mg daily which helped a lot.       FU orthopedics for lumbar radiculopathy and lumbar spinal stenosis s/p L2-S2 laminectomy in 2016. Has balance problems. She uses cane/walker.      1 cat lives with her. Son Thor lives next door to her.   Does all ADL's. Does not cook. Does not drive. Son helped her.   Afraid of falling.   Denies depression.       Review of Systems   Constitutional:  Negative for appetite change, chills and fever.   HENT:  Negative for congestion, ear pain, sinus pain and sore throat.    Eyes:  Negative for discharge and itching.   Respiratory:  Negative for apnea, cough, chest tightness, shortness of breath and wheezing.    Cardiovascular:  Negative for chest pain, palpitations and leg swelling.   Gastrointestinal:  Negative for abdominal pain, anal bleeding, constipation, diarrhea, nausea and vomiting.   Endocrine: Negative for cold intolerance, heat intolerance and polyuria.   Genitourinary:  Negative for difficulty urinating and dysuria.   Musculoskeletal:  Positive for gait problem. Negative for arthralgias, back pain and myalgias.   Skin:  Negative for rash.   Neurological:  Negative for dizziness and headaches.   Psychiatric/Behavioral:  Negative for agitation.        Objective   /84   Pulse 100   Temp (!) 97.1 °F (36.2 °C) (Temporal)   Resp 18   Ht 5' 5\" (1.651 m)   Wt 72.4 kg (159 lb 9.6 oz)   SpO2 95%   BMI 26.56 kg/m² "      Physical Exam  Constitutional:       General: She is not in acute distress.     Appearance: She is well-developed.   HENT:      Head: Normocephalic.   Eyes:      General:         Right eye: No discharge.         Left eye: No discharge.      Conjunctiva/sclera: Conjunctivae normal.   Neck:      Thyroid: No thyromegaly.   Cardiovascular:      Rate and Rhythm: Normal rate and regular rhythm.      Heart sounds: Normal heart sounds. No murmur heard.     No friction rub. No gallop.   Pulmonary:      Effort: Pulmonary effort is normal. No respiratory distress.      Breath sounds: Normal breath sounds. No wheezing or rales.   Chest:      Chest wall: No tenderness.   Abdominal:      General: Bowel sounds are normal. There is no distension.      Palpations: Abdomen is soft. There is no mass.      Tenderness: There is no abdominal tenderness. There is no guarding or rebound.   Musculoskeletal:         General: No tenderness or deformity.      Cervical back: Normal range of motion.      Comments: Use walker   Lymphadenopathy:      Cervical: No cervical adenopathy.   Neurological:      Mental Status: She is alert.

## 2025-05-05 NOTE — ASSESSMENT & PLAN NOTE
Continue zetia 10mg qhs per cardiology.   Orders:    Comprehensive metabolic panel; Future    Hemoglobin A1C With EAG; Future    TSH, 3rd generation with Free T4 reflex; Future

## 2025-05-05 NOTE — ASSESSMENT & PLAN NOTE
Continue lisinopril 5mg QD per cardiology.   Orders:    Comprehensive metabolic panel; Future    Hemoglobin A1C With EAG; Future    TSH, 3rd generation with Free T4 reflex; Future

## 2025-05-05 NOTE — ASSESSMENT & PLAN NOTE
Right upper back mole for a while. It bothers her recently. Itchy. No pain.   Refer to dermatology.   Orders:    Ambulatory Referral to Dermatology; Future

## 2025-05-05 NOTE — ASSESSMENT & PLAN NOTE
Low carb diet.   Orders:    Comprehensive metabolic panel; Future    Hemoglobin A1C With EAG; Future    TSH, 3rd generation with Free T4 reflex; Future

## 2025-05-05 NOTE — ASSESSMENT & PLAN NOTE
Continue levothyroxine 88mcg daily.   Orders:    Comprehensive metabolic panel; Future    Hemoglobin A1C With EAG; Future    TSH, 3rd generation with Free T4 reflex; Future

## 2025-06-02 DIAGNOSIS — E03.9 HYPOTHYROIDISM, UNSPECIFIED TYPE: ICD-10-CM

## 2025-06-03 RX ORDER — LEVOTHYROXINE SODIUM 88 UG/1
88 TABLET ORAL DAILY
Qty: 90 TABLET | Refills: 1 | Status: SHIPPED | OUTPATIENT
Start: 2025-06-03

## 2025-07-16 DIAGNOSIS — G43.909 MIGRAINE: ICD-10-CM

## 2025-07-17 RX ORDER — TOPIRAMATE 50 MG/1
50 TABLET, FILM COATED ORAL DAILY
Qty: 90 TABLET | Refills: 0 | Status: SHIPPED | OUTPATIENT
Start: 2025-07-17

## 2025-07-29 ENCOUNTER — TELEPHONE (OUTPATIENT)
Age: 85
End: 2025-07-29